# Patient Record
Sex: FEMALE | Race: WHITE | NOT HISPANIC OR LATINO | Employment: PART TIME | URBAN - METROPOLITAN AREA
[De-identification: names, ages, dates, MRNs, and addresses within clinical notes are randomized per-mention and may not be internally consistent; named-entity substitution may affect disease eponyms.]

---

## 2017-02-07 ENCOUNTER — OFFICE VISIT (OUTPATIENT)
Dept: HEMATOLOGY | Facility: CLINIC | Age: 37
End: 2017-02-07
Attending: INTERNAL MEDICINE
Payer: COMMERCIAL

## 2017-02-07 VITALS
DIASTOLIC BLOOD PRESSURE: 95 MMHG | SYSTOLIC BLOOD PRESSURE: 154 MMHG | TEMPERATURE: 97.4 F | BODY MASS INDEX: 34.98 KG/M2 | WEIGHT: 236.2 LBS | HEART RATE: 93 BPM | HEIGHT: 69 IN

## 2017-02-07 DIAGNOSIS — O26.20 RECURRENT PREGNANCY LOSS, ANTEPARTUM: ICD-10-CM

## 2017-02-07 DIAGNOSIS — O26.20 RECURRENT PREGNANCY LOSS, ANTEPARTUM: Primary | ICD-10-CM

## 2017-02-07 PROCEDURE — 85730 THROMBOPLASTIN TIME PARTIAL: CPT | Performed by: INTERNAL MEDICINE

## 2017-02-07 PROCEDURE — 86147 CARDIOLIPIN ANTIBODY EA IG: CPT | Performed by: INTERNAL MEDICINE

## 2017-02-07 PROCEDURE — 86146 BETA-2 GLYCOPROTEIN ANTIBODY: CPT | Performed by: INTERNAL MEDICINE

## 2017-02-07 PROCEDURE — 99213 OFFICE O/P EST LOW 20 MIN: CPT

## 2017-02-07 PROCEDURE — 36415 COLL VENOUS BLD VENIPUNCTURE: CPT | Performed by: INTERNAL MEDICINE

## 2017-02-07 PROCEDURE — 85613 RUSSELL VIPER VENOM DILUTED: CPT | Performed by: INTERNAL MEDICINE

## 2017-02-07 PROCEDURE — 00000167 ZZHCL STATISTIC INR NC: Performed by: INTERNAL MEDICINE

## 2017-02-07 PROCEDURE — 00000401 ZZHCL STATISTIC THROMBIN TIME NC: Performed by: INTERNAL MEDICINE

## 2017-02-07 PROCEDURE — 99215 OFFICE O/P EST HI 40 MIN: CPT | Mod: GC | Performed by: INTERNAL MEDICINE

## 2017-02-07 ASSESSMENT — PAIN SCALES - GENERAL: PAINLEVEL: NO PAIN (0)

## 2017-02-07 NOTE — MR AVS SNAPSHOT
After Visit Summary   2/7/2017    Ruma Vega    MRN: [id number]           Patient Information     Date Of Birth          1980        Visit Information        Provider Department      2/7/2017 4:00 PM Delroy Smart MD [facility]        Today's Diagnoses     Recurrent pregnancy loss, antepartum    -  1       Care Instructions    Labs today.  Orders have been placed for antiphospholipid antibody testing.  The lab located on first floor of this building. We will call you with the results.    We talked about the possibility of getting treated with Enoxaparin (Lovenox) 40 mg once daily along with Aspirin 81 mg.  This may be used if you if you have antibodies or may be used empirically.    Call the [facility]  at [phone]   -If surgeries or procedures are planned.    -If off anticoagulation, please call during high risk times (long-distance travel, broken      bones or trauma, immobilization, surgery, pregnancy, or taking estrogen).   -Any new symptoms of DVT (deep vein thrombosis) or PE (pulmonary embolism)    -pain     -swelling     -redness    -warmth    -shortness of breath    -chest pain    -coughing up blood    Return to clinic as desired and about 30 weeks along a pregnancy to discuss a delivery plan. .    Your nurse clinician is Rossana Vitale RN at phone number  [phone].  If she is unavailable and you have immediate concerns, please call [phone] and ask for a nurse.     Rossana Vitale RN - Nurse Clinician - [facility] - [phone]              Follow-ups after your visit        Future tests that were ordered for you today     Open Future Orders        Priority Expected Expires Ordered    Beta 2 Glycoprotein 1 Antibody IgM Routine  2/7/2018 2/7/2017    Beta 2 Glycoprotein 1 Antibody IgG Routine  2/7/2018 2/7/2017    Lupus panel Routine  2/7/2018 2/7/2017            Who to  "contact     If you have questions or need follow up information about today's clinic visit or your schedule please contact [facility] directly at [phone].  Normal or non-critical lab and imaging results will be communicated to you by MyChart, letter or phone within 4 business days after the clinic has received the results. If you do not hear from us within 7 days, please contact the clinic through [employer] or phone. If you have a critical or abnormal lab result, we will notify you by phone as soon as possible.  Submit refill requests through [facility] or call your pharmacy and they will forward the refill request to us. Please allow 3 business days for your refill to be completed.          Additional Information About Your Visit        [facility] Information     [facility] gives you secure access to your electronic health record. If you see a primary care provider, you can also send messages to your care team and make appointments. If you have questions, please call your primary care clinic.  If you do not have a primary care provider, please call [phone] and they will assist you.        Care EveryWhere ID     This is your Care EveryWhere ID. This could be used by other organizations to access your [place] medical records  [id number]        Your Vitals Were     Pulse Temperature Height BMI (Body Mass Index)          93 97.4  F (36.3  C) (Oral) 1.753 m (5' 9\") 34.86 kg/m2         Blood Pressure from Last 3 Encounters:   02/07/17 154/95   06/27/16 152/94   11/18/15 143/88    Weight from Last 3 Encounters:   02/07/17 107.14 kg (236 lb 3.2 oz)   06/27/16 104.509 kg (230 lb 6.4 oz)   11/18/15 106.459 kg (234 lb 11.2 oz)              We Performed the Following     Cardiolipin Tiffany IgG and IgM        Primary Care Provider Office Phone # Fax #    Shannon Jerri Holder [phone]        [facility] [address] [address]  [facility] [address]        Thank you!     Thank you for " choosing [facility]ED[facility]  for your care. Our goal is always to provide you with excellent care. Hearing back from our patients is one way we can continue to improve our services. Please take a few minutes to complete the written survey that you may receive in the mail after your visit with us. Thank you!             Your Updated Medication List - Protect others around you: Learn how to safely use, store and throw away your medicines at www.disposemymeds.org.          This list is accurate as of: 2/7/17  4:48 PM.  Always use your most recent med list.                   Brand Name Dispense Instructions for use    FLONASE 50 MCG/ACT spray   Generic drug:  fluticasone     1 Package    Spray 1-2 sprays into both nostrils daily       ketotifen 0.025 % Soln ophthalmic solution    ZADITOR/REFRESH ANTI-ITCH     Place 1 drop into both eyes 2 times daily       PRENATAL VITAMINS PO      Take  by mouth daily.       psyllium 58.6 % Powd    METAMUCIL     Take by mouth daily       SINUS RINSE BOTTLE KIT NA      Spray 1 packet in nostril as needed

## 2017-02-07 NOTE — NURSING NOTE
"Chief Complaint   Patient presents with     RECHECK     follow up with clotting disorder, tzimmer cma       Initial /95 mmHg  Pulse 93  Temp(Src) 97.4  F (36.3  C) (Oral)  Ht 1.753 m (5' 9\")  Wt 107.14 kg (236 lb 3.2 oz)  BMI 34.86 kg/m2 Estimated body mass index is 34.86 kg/(m^2) as calculated from the following:    Height as of this encounter: 1.753 m (5' 9\").    Weight as of this encounter: 107.14 kg (236 lb 3.2 oz).  Medication Reconciliation: complete    "

## 2017-02-07 NOTE — PATIENT INSTRUCTIONS
Labs today.  Orders have been placed for antiphospholipid antibody testing.  The lab located on first floor of this building. We will call you with the results.    We talked about the possibility of getting treated with Enoxaparin (Lovenox) 40 mg once daily along with Aspirin 81 mg.  This may be used if you if you have antibodies or may be used empirically.    Call the [facility]  at [phone]   -If surgeries or procedures are planned.    -If off anticoagulation, please call during high risk times (long-distance travel, broken      bones or trauma, immobilization, surgery, pregnancy, or taking estrogen).   -Any new symptoms of DVT (deep vein thrombosis) or PE (pulmonary embolism)    -pain     -swelling     -redness    -warmth    -shortness of breath    -chest pain    -coughing up blood    Return to clinic as desired and about 30 weeks along a pregnancy to discuss a delivery plan. .    Your nurse clinician is Rossana Vitale RN at phone number  [phone].  If she is unavailable and you have immediate concerns, please call [phone] and ask for a nurse.     Rossana Vitale RN - Nurse Clinician - [facility] - [phone]

## 2017-02-07 NOTE — PROGRESS NOTES
"  [facility]  Outpatient Clinic Visit  Date of Service: 2/7/2017    HPI:  Ruma is a 34-year-old woman with heterozygous for factor V Leiden who is here today for discussion regarding the implications of her genetic thrombophilia during pregnancy. She was last seen by Dr. Smart in 7/2015.  She was a kidney donor for her father who is heterozygous for factor V Leiden on routine testing prior to transplant and the reason why she was tested.  Her father has had superficial thrombophlebitis.     Since we last saw her in 2015, she has remained healthy.  She has had no clotting events. No new FH of VTE. She however has had two miscarriages at approx 6 weeks gestation (10/2015 and 9/2016). She is undergoing progesterone and Clomid fertility treatments since last August.  She has questions regarding her factor V Leiden status as it relates to risk of clotting during and after pregnancy. She is wondering if there is anything else she can do to prevent miscarriage from clotting perspective. Her overall health has otherwise remained good.  For the second pregnancy she took daily baby aspirin.      PHYSICAL EXAMINATION:    /95 mmHg  Pulse 93  Temp(Src) 97.4  F (36.3  C) (Oral)  Ht 1.753 m (5' 9\")  Wt 107.14 kg (236 lb 3.2 oz)  BMI 34.86 kg/m2  Gen: appears well, NAD  HEENT: NCAT, sclera nonicteric, MMM  CV: RRR  Chest: CTAB  Ext: no LE edema       LABORATORY DATA:  No new labs were obtained here today.         ASSESSMENT AND PLAN:   1.  Heterozygous for factor V Leiden with no personal history of thrombosis.    2.  Family history of superficial thrombophlebitis in her father who also has factor V Leiden.  No other known family history of venous or arterial thrombosis.    3.  Recurrent 1st trimester miscarriage     She is an asymptomatic carrier of the factor V Leiden mutation.  We discussed that current thought is that Factor V Leiden (or other inherited thrombophilias) are not the " cause of early pregnancy loss. Given the absence of any personal clotting history and given her reassuring family history, she does not need prophylactic anticoagulation for Factor V Leiden indication during pregnancy. Postpartum, we would recommend 6 weeks of prophylactic anticoagulation with enoxaparin 40 mg once daily, although recently there has been a tendency to even back off that recommendation, particularly for individuals with a reassuring family history.      We reviewed that the one clotting issue that is aossciated with early pregnancy loss is antiphospholipid antibody syndrome. We reviewed that diagnosis requires three pregnancy losses, however given the cost of infertility treatments she would prefer to do testing now instead of waiting for another miscarriage. If she is found to have positive lupus inhibitor, anti-CL or b2-GP antibodies we could do prophylactic enoxaparin 40 mg antepartum plus daily baby aspirin.      Should she have negative testing for APLS, then no strong indication for anticoagulation, however given the minimal risk of prophylactic Lovenox this could be considered if she was anxious to do something. She understands the likely minimal benefit of this.     Plan:  -send lupus AC, anti-CL and B2-GP antibodies  -pt will be called with results   -RTC as needed if new questions or concerns arise    Patient seen and discussed with Dr. Smart.  Glendy Bledsoe MD         HEMATOLOGY STAFF:  Seen with fellow, whose note reflects our joint evaluation, assessment, and plan.    No evidence of antiphospholipid antibodies -- lupus anticoagulant, cardiolipin and beta-2-glycoprotein IgG and IgM titers are all negative.    As outlined above, we would recommend 6 weeks of post partum prophylactic intensity anticoagulation (enoxaparin 40 mg daily) given that she is heterozygous for factor V Leiden.    Could consider adding empiric prophylactic intensity anticoagulation (enoxaparin 40 mg daily) to ASA  during pregnancy due to recurrent early miscarriage, but this approach is not evidence based, and may not provide any additional protection against recurrent pregnancy loss.  I would lean away from this, but if she felt very strongly, would be willing to support her decision, given that the bleeding risk would be very low.    Delroy Smart MD  Associate Professor of Medicine  Division of Hematology, Oncology, and Transplantation  Director, [facility]      Total time 40 minutes, all in counseling and coordination of care.

## 2017-02-07 NOTE — NURSING NOTE
Teaching Flowsheet   Relevant Diagnosis: no history of clotting, is heterozygote for factor V Leiden.  Trying to get pregnant and has a history of 2 early miscarriages.   Teaching Topic: basics of thrombosis and plan of care     Person(s) involved in teaching:   Patient and , Nicola     Motivation Level:  Asks Questions: Yes    Eager to Learn: Yes  Cooperative: Yes  Receptive (willing/able to accept information): Yes  Comments: took copious notes and asked many appropriate questions.     Patient and Family demonstrates understanding of the following:  Reason for the appointment, diagnosis and treatment plan: Yes  Knowledge of proper use of medications and conditions for which they are ordered (with special attention to potential side effects or drug interactions): Yes  Which situations necessitate calling provider and whom to contact: Yes      Proper use and care of   (medical equip, care aids, etc.): NA  Nutritional needs and diet plan: NA  Pain management techniques: NA  Wound Care: NA  How and/when to access community resources: Yes    Reviewed signs, symptoms of and risk factors for venous thrombosis (VTE) and provided an educational  book mariam, which reviews these facts. And includes the following web links  for further information:  www.stoptheclot.org, www.clotconnect.org.    Patient educated about benefits and potential complications of anticoagulation.       Discussed process to self inject enoxaparin.  She has done this previously and will provide online resources if plan is to use this.     Patient verbalized understanding of the above information.  Reviewed and discussed the patient instructions point by point and patient verbalized understanding.They deny further questions at this time.    Copy of the After Visit Summary (AVS) given to patient for future reference. Patient given the contact card for the [facility] and has the appropriate numbers to call with any  questions.    Rossana Vitale RN - Nurse Clinician - [facility] - [phone]

## 2017-02-08 LAB
CARDIOLIPIN ANTIBODY IGG: NORMAL GPL-U/ML (ref 0–19.9)
CARDIOLIPIN ANTIBODY IGM: 4.9 MPL-U/ML (ref 0–19.9)

## 2017-02-09 LAB
B2 GLYCOPROT1 IGG SERPL IA-ACNC: NORMAL U/ML
B2 GLYCOPROT1 IGM SERPL IA-ACNC: NORMAL U/ML

## 2017-02-10 LAB — LA PPP-IMP: NORMAL

## 2018-01-16 ENCOUNTER — TRANSFERRED RECORDS (OUTPATIENT)
Dept: HEALTH INFORMATION MANAGEMENT | Facility: CLINIC | Age: 38
End: 2018-01-16

## 2018-01-16 ENCOUNTER — MYC MEDICAL ADVICE (OUTPATIENT)
Dept: OBGYN | Facility: CLINIC | Age: 38
End: 2018-01-16

## 2018-01-17 NOTE — TELEPHONE ENCOUNTER
Spoke with patient via telephone to coordinate requested appointments. Scheduled 3 appts: u/s, intake, and rtn with Dr. Wheeler, on 1/29.     Patient appreciative and had no further questions at this time.

## 2018-01-29 ENCOUNTER — OFFICE VISIT (OUTPATIENT)
Dept: OBGYN | Facility: CLINIC | Age: 38
End: 2018-01-29
Attending: ADVANCED PRACTICE MIDWIFE
Payer: COMMERCIAL

## 2018-01-29 VITALS — WEIGHT: 229.5 LBS | BODY MASS INDEX: 33.99 KG/M2 | HEIGHT: 69 IN

## 2018-01-29 DIAGNOSIS — O26.20 RECURRENT PREGNANCY LOSS WITH CURRENT PREGNANCY: ICD-10-CM

## 2018-01-29 DIAGNOSIS — O26.20 RECURRENT PREGNANCY LOSS, CURRENTLY PREGNANT: ICD-10-CM

## 2018-01-29 DIAGNOSIS — O09.521 ELDERLY MULTIGRAVIDA IN FIRST TRIMESTER: ICD-10-CM

## 2018-01-29 DIAGNOSIS — D68.51 FACTOR V LEIDEN (H): ICD-10-CM

## 2018-01-29 DIAGNOSIS — D68.51 FACTOR V LEIDEN (H): Primary | ICD-10-CM

## 2018-01-29 DIAGNOSIS — O09.90 HIGH RISK PREGNANCY, ANTEPARTUM: Primary | ICD-10-CM

## 2018-01-29 PROBLEM — O09.529 AMA (ADVANCED MATERNAL AGE) MULTIGRAVIDA 35+: Status: ACTIVE | Noted: 2018-01-29

## 2018-01-29 LAB
ABO + RH BLD: NORMAL
ABO + RH BLD: NORMAL
BASOPHILS # BLD AUTO: 0.1 10E9/L (ref 0–0.2)
BASOPHILS NFR BLD AUTO: 0.4 %
BLD GP AB SCN SERPL QL: NORMAL
BLOOD BANK CMNT PATIENT-IMP: NORMAL
DIFFERENTIAL METHOD BLD: ABNORMAL
EOSINOPHIL # BLD AUTO: 0.5 10E9/L (ref 0–0.7)
EOSINOPHIL NFR BLD AUTO: 4.2 %
ERYTHROCYTE [DISTWIDTH] IN BLOOD BY AUTOMATED COUNT: 12.5 % (ref 10–15)
HCT VFR BLD AUTO: 40.4 % (ref 35–47)
HGB BLD-MCNC: 13.5 G/DL (ref 11.7–15.7)
IMM GRANULOCYTES # BLD: 0.1 10E9/L (ref 0–0.4)
IMM GRANULOCYTES NFR BLD: 0.5 %
LYMPHOCYTES # BLD AUTO: 2.1 10E9/L (ref 0.8–5.3)
LYMPHOCYTES NFR BLD AUTO: 17.4 %
MCH RBC QN AUTO: 28.9 PG (ref 26.5–33)
MCHC RBC AUTO-ENTMCNC: 33.4 G/DL (ref 31.5–36.5)
MCV RBC AUTO: 87 FL (ref 78–100)
MONOCYTES # BLD AUTO: 0.6 10E9/L (ref 0–1.3)
MONOCYTES NFR BLD AUTO: 4.7 %
NEUTROPHILS # BLD AUTO: 8.9 10E9/L (ref 1.6–8.3)
NEUTROPHILS NFR BLD AUTO: 72.8 %
NRBC # BLD AUTO: 0 10*3/UL
NRBC BLD AUTO-RTO: 0 /100
PLATELET # BLD AUTO: 378 10E9/L (ref 150–450)
RBC # BLD AUTO: 4.67 10E12/L (ref 3.8–5.2)
SPECIMEN EXP DATE BLD: NORMAL
WBC # BLD AUTO: 12.2 10E9/L (ref 4–11)

## 2018-01-29 PROCEDURE — 85025 COMPLETE CBC W/AUTO DIFF WBC: CPT | Performed by: ADVANCED PRACTICE MIDWIFE

## 2018-01-29 PROCEDURE — 87389 HIV-1 AG W/HIV-1&-2 AB AG IA: CPT | Performed by: ADVANCED PRACTICE MIDWIFE

## 2018-01-29 PROCEDURE — 36415 COLL VENOUS BLD VENIPUNCTURE: CPT | Performed by: ADVANCED PRACTICE MIDWIFE

## 2018-01-29 PROCEDURE — 86901 BLOOD TYPING SEROLOGIC RH(D): CPT | Performed by: ADVANCED PRACTICE MIDWIFE

## 2018-01-29 PROCEDURE — 86850 RBC ANTIBODY SCREEN: CPT | Performed by: ADVANCED PRACTICE MIDWIFE

## 2018-01-29 PROCEDURE — G0463 HOSPITAL OUTPT CLINIC VISIT: HCPCS | Mod: ZF

## 2018-01-29 PROCEDURE — 86762 RUBELLA ANTIBODY: CPT | Performed by: ADVANCED PRACTICE MIDWIFE

## 2018-01-29 PROCEDURE — 86780 TREPONEMA PALLIDUM: CPT | Performed by: ADVANCED PRACTICE MIDWIFE

## 2018-01-29 PROCEDURE — 87340 HEPATITIS B SURFACE AG IA: CPT | Performed by: ADVANCED PRACTICE MIDWIFE

## 2018-01-29 PROCEDURE — 87086 URINE CULTURE/COLONY COUNT: CPT | Performed by: ADVANCED PRACTICE MIDWIFE

## 2018-01-29 PROCEDURE — 86900 BLOOD TYPING SEROLOGIC ABO: CPT | Performed by: ADVANCED PRACTICE MIDWIFE

## 2018-01-29 PROCEDURE — 82306 VITAMIN D 25 HYDROXY: CPT | Performed by: ADVANCED PRACTICE MIDWIFE

## 2018-01-29 PROCEDURE — 76817 TRANSVAGINAL US OBSTETRIC: CPT | Mod: ZF

## 2018-01-29 RX ORDER — PROGESTERONE 50 MG/ML
50 INJECTION, SOLUTION INTRAMUSCULAR DAILY
COMMUNITY
End: 2018-02-15

## 2018-01-29 RX ORDER — MOMETASONE FUROATE 1 MG/G
CREAM TOPICAL DAILY
COMMUNITY
End: 2019-09-23

## 2018-01-29 NOTE — MR AVS SNAPSHOT
After Visit Summary   1/29/2018    Ruma Vega    MRN: [id number]           Patient Information     Date Of Birth          1980        Visit Information        Provider Department      1/29/2018 12:45 PM Agnes Wheeler MD [facility]        Today's Diagnoses     High risk pregnancy, antepartum    -  1    Factor V Leiden (H)        Elderly multigravida in first trimester        Recurrent pregnancy loss, currently pregnant           Follow-ups after your visit        Follow-up notes from your care team     Return in about 2 weeks (around 2/12/2018) for NOB visit with Liam.      Your next 10 appointments already scheduled     Feb 15, 2018  9:15 AM CST   RETURN OB with Agnes Wheeler MD   [facility] ([facility])    [facility] [address]  [address]  [facility] [id number] [phone]              Who to contact     Please call your clinic at [phone] to:    Ask questions about your health    Make or cancel appointments    Discuss your medicines    Learn about your test results    Speak to your doctor   If you have compliments or concerns about an experience at your clinic, or if you wish to file a complaint, please contact [facility] Patient Relations at [phone] or email us at [email or web address]         Additional Information About Your Visit        MyChart Information     [employer] gives you secure access to your electronic health record. If you see a primary care provider, you can also send messages to your care team and make appointments. If you have questions, please call your primary care clinic.  If you do not have a primary care provider, please call [phone] and they will assist you.      [employer] is an electronic gateway that provides easy, online access to your medical records. With [employer], you can request a clinic appointment, read your  test results, renew a prescription or communicate with your care team.     To access your existing account, please contact your [facility] or call [phone] for assistance.        Care EveryWhere ID     This is your Care EveryWhere ID. This could be used by other organizations to access your [place] medical records  [id number]         Blood Pressure from Last 3 Encounters:   02/07/17 (!) 154/95   06/27/16 (!) 152/94   11/18/15 143/88    Weight from Last 3 Encounters:   01/29/18 104.1 kg (229 lb 8 oz)   02/07/17 107.1 kg (236 lb 3.2 oz)   06/27/16 104.5 kg (230 lb 6.4 oz)              Today, you had the following     No orders found for display       Primary Care Provider Office Phone # Fax #    Shannon Jerri Holder [phone]       [facility] [address]  [facility] [address]        Equal Access to Services     DEBRA [facility] HospitalANJALI : Hadii aad ku hadasho Soomaali, waaxda luqadaha, qaybta kaalmada adeegyada, waxay idiin haywendien pablo shoemaker . So [facility] [phone].    ATENCIÓN: Si habla español, tiene a figueroa disposición servicios gratuitos de asistencia lingüística. Llame al [phone].    We comply with applicable federal civil rights laws and [place] laws. We do not discriminate on the basis of race, color, national origin, age, disability, sex, sexual orientation, or gender identity.            Thank you!     Thank you for choosing [facility]  for your care. Our goal is always to provide you with excellent care. Hearing back from our patients is one way we can continue to improve our services. Please take a few minutes to complete the written survey that you may receive in the mail after your visit with us. Thank you!             Your Updated Medication List - Protect others around you: Learn how to safely use, store and throw away your medicines at www.disposemymeds.org.          This list is accurate as of 1/29/18   1:28 PM.  Always use your most recent med list.                   Brand Name Dispense Instructions for use Diagnosis    aspirin 81 MG tablet      Take by mouth daily        FLONASE 50 MCG/ACT spray   Generic drug:  fluticasone     1 Package    Spray 1-2 sprays into both nostrils daily    Allergic rhinitis       FOLIC ACID PO      Take 800 mcg by mouth daily        ketotifen 0.025 % Soln ophthalmic solution    ZADITOR/REFRESH ANTI-ITCH     Place 1 drop into both eyes 2 times daily        mometasone 0.1 % cream    ELOCON     Apply topically daily        PRENATAL VITAMINS PO      Take  by mouth daily.    Flu vaccine need       progesterone (in sesame oil) 50 MG/ML injection      Inject 50 mg into the muscle daily        psyllium 58.6 % Powd    METAMUCIL     Take by mouth daily        SINUS RINSE BOTTLE KIT NA      Spray 1 packet in nostril as needed

## 2018-01-29 NOTE — PATIENT INSTRUCTIONS
"  Thank you for choosing [facility] for your obstetrical care.  We are an integrated health clinic with obstetricians, midwives, a psychologist, an acupuncturist, a nutritionist, a pharmacist, internal medicine and family practice all in one.  If you have questions about services offered please ask.      o Please keep all appointments with your provider.  You will help catch, prevent and treat problems early.  o Review your Expectant Family booklet and folder given to you at this intake visit.  It can answer many basic questions including:    Treatment for nausea and vomiting    Medications that are safe in pregnancy    Healthy diet and weight gain    Exercise and activity  o ASK questions!!  Please use \"Questions for my New OB visit\" form to write down any questions or concerns for your next visit.  o Eat a healthy diet.  Visit www.choosemyplate.gov and click on  Pregnancy and Breastfeeding  for information and tips  o Do not smoke.  Avoid other people's smoke, too.  We are happy to help with referrals to stop smoking programs.  o Do not drink alcohol.  o Try to avoid people who have colds or other infections.  Practice good hand washing.  o Consider registering for our Healthy Pregnancy Class here at [facility].  This class is offered every 3rd Wednesday from 2:30-4:30 p.m.  [place] at [phone] or online at [email or web address] or [email or web address]  o Consider registering for prenatal education classes through [place] at [facility].  You can view class schedules and register online at [email or web address] or call [phone] for questions     For urgent concerns, call [facility] at [phone] to speak with a triage nurse during regular clinic hours (8:00 am - 4:30 pm).  This line is answered by our service 24 hours a day, after hours a provider will return your call.  "

## 2018-01-29 NOTE — PROGRESS NOTES
Acute OB Visit Note  18    Reason for visit: Check in for pregnancy care    S: Patient is a 36 yo  @ 9w2d by early US at [facility] who presents to discuss continuity of care from [facility].  Patient initially evaluated by me for fertility concerns and was referred to [facility] secondary to low AMH.  Patient has since unfortunately had 4 early losses.  This pregnancy was medically controlled with medications at [facility], but conceived without IUI or IVF.  Patient states aside from some fatigue feeling well.  Is doing injectable progesterone through 10 weeks per [facility] as had previously used progesterone vaginal suppositories with lots of vaginal irritation and bleeding.  She denies any bleeding or cramping currently, but two weeks back prior to initiation of the progesterone did have some cramping that resolved with progesterone.  She is taking baby aspirin but has decided again Lovenox now, but open to taking postpartum.  She otherwise just wants to be sure that all continues to go well, aware of signs to look for with regards to miscarriage.  Has new OB intake earlier today with viability scan.    O:  There were no vitals filed for this visit.   See OB intake visit for vitals    General: NAD, A&Ox3  Resp: Non-labored breathing    A/P: 37 yo  @ 9w2d by early US presents for acute OB visit  1) Prenatal care: Has NOB labs pending today from NOB intake.  Will need GC/Chlamydia at NOB visit.  2) AMA/Genetic screening: Patient had genetic counseling and MFM referral placed at NOB intake, discussed first trimester and NIPT testing and patient will likely proceed with this.  Will also get early GCT and preeclampsia labs due to this history.  3) Screening for malignant neoplasm of cervix: History of LSIL/CHRIS-1 in .  Has NILM pap in  and NILM/HPV negative pap in 2015, not due until 2020.  4) Factor V Leiden heterozygote: Patient seen by Dr. Smart in the past.  Has negative APS testing.  Is on baby ASA currently.  No  personal history of clot.  Plan for 6 weeks Lovenox 40 mg daily postpartum and patient agreeable with this plan, declines during prenatal care as not studies to show in her case beneficial.  5) History of kidney donation: Given to her father in past in 2010 without altered kidney function.  Will get baseline creatinine and Urine Pr:Cr given AMA and this history at NOB visit  6) Check flu shot status at NOB visit  7) RTC in 2 weeks for NOB visit    Agnes Wheeler MD

## 2018-01-29 NOTE — PROGRESS NOTES
37 year old female, , 9 2/7 weeks by early ultrasound.Estimated Date of Delivery: 2018,   presents for confirmation of dates and assessment of viability. This study was done transvaginally.    Measurements     CRL = 24.8 mm = 9 1/7 weeks  EGA.        Fetal anatomy appears normal for gestational age.     Fetal/Fetal Cardiac Activity: Present.  FHR = 176bpm.     Implantation: Intrauterine.     Cervix = 4.8 cm      Maternal structures appear normal.    Impression: Single viable intrauterine pregnancy at 9w2d c/w early US dating from [facility].  Use CHRISTINA of 18 based on early scan.    Recommend comprehensive scan at 18 to 20 weeks.    IKER Mercado MD

## 2018-01-29 NOTE — MR AVS SNAPSHOT
After Visit Summary   1/29/2018    Ruma Vega    MRN: [id number]           Patient Information     Date Of Birth          1980        Visit Information        Provider Department      1/29/2018 10:30 AM [facility] ULTRASOUND [facility]        Today's Diagnoses     Factor V Leiden (H)    -  1    Recurrent pregnancy loss with current pregnancy           Follow-ups after your visit        Your next 10 appointments already scheduled     Jan 29, 2018 12:45 PM CST   Return Visit with Agnes Wheeler MD   [facility] ([facility])    [facility] [address]  [address]  [facility] [id number] [phone]            Feb 12, 2018 11:30 AM CST   NEW OB with DANA Morocho CNM   [facility] ([facility])    [facility] [address]  [address]  [facility] [address] [phone]              Who to contact     Please call your clinic at [phone] to:    Ask questions about your health    Make or cancel appointments    Discuss your medicines    Learn about your test results    Speak to your doctor   If you have compliments or concerns about an experience at your clinic, or if you wish to file a complaint, please contact [facility] Patient Relations at [phone] or email us at [email or web address]         Additional Information About Your Visit        MyChart Information     [employer] gives you secure access to your electronic health record. If you see a primary care provider, you can also send messages to your care team and make appointments. If you have questions, please call your primary care clinic.  If you do not have a primary care provider, please call [phone] and they will assist you.      inSelly is an electronic gateway that provides easy, online access to your medical records. With  Matt, you can request a clinic appointment, read your test results, renew a prescription or communicate with your care team.     To access your existing account, please contact your [facility] or call [phone] for assistance.        Care EveryWhere ID     This is your Care EveryWhere ID. This could be used by other organizations to access your [place] medical records  [id number]         Blood Pressure from Last 3 Encounters:   02/07/17 (!) 154/95   06/27/16 (!) 152/94   11/18/15 143/88    Weight from Last 3 Encounters:   01/29/18 104.1 kg (229 lb 8 oz)   02/07/17 107.1 kg (236 lb 3.2 oz)   06/27/16 104.5 kg (230 lb 6.4 oz)               Primary Care Provider Office Phone # Fax #    Shannon Jerri Holder [phone]       [facility] [address] [facility] [address]        Equal Access to Services     JOSÉ ANTONIO WESTFALL : Hadii aad ku hadasho Soomaali, waaxda luqadaha, qaybta kaalmada adeegyada, waxay idiin hayaan sabrinaeg kharakenna shoemaker . So [facility] [phone].    ATENCIÓN: Si habla español, tiene a figueroa disposición servicios gratuitos de asistencia lingüística. Adiparul al [phone].    We comply with applicable federal civil rights laws and [place] laws. We do not discriminate on the basis of race, color, national origin, age, disability, sex, sexual orientation, or gender identity.            Thank you!     Thank you for choosing [facility]  for your care. Our goal is always to provide you with excellent care. Hearing back from our patients is one way we can continue to improve our services. Please take a few minutes to complete the written survey that you may receive in the mail after your visit with us. Thank you!             Your Updated Medication List - Protect others around you: Learn how to safely use, store and throw away your medicines at www.disposemymeds.org.          This list is accurate as of 1/29/18 12:35 PM.   Always use your most recent med list.                   Brand Name Dispense Instructions for use Diagnosis    aspirin 81 MG tablet      Take by mouth daily        FLONASE 50 MCG/ACT spray   Generic drug:  fluticasone     1 Package    Spray 1-2 sprays into both nostrils daily    Allergic rhinitis       FOLIC ACID PO      Take 800 mcg by mouth daily        ketotifen 0.025 % Soln ophthalmic solution    ZADITOR/REFRESH ANTI-ITCH     Place 1 drop into both eyes 2 times daily        mometasone 0.1 % cream    ELOCON     Apply topically daily        PRENATAL VITAMINS PO      Take  by mouth daily.    Flu vaccine need       progesterone (in sesame oil) 50 MG/ML injection      Inject 50 mg into the muscle daily        psyllium 58.6 % Powd    METAMUCIL     Take by mouth daily        SINUS RINSE BOTTLE KIT NA      Spray 1 packet in nostril as needed

## 2018-01-29 NOTE — LETTER
2018       RE: Ruma Vega  [address] [facility] [address]     Dear Colleague,    Thank you for referring your patient, Ruma Vega, to the [facility] at [facility]. Please see a copy of my visit note below.    Acute OB Visit Note  18    Reason for visit: Check in for pregnancy care    S: Patient is a 38 yo  @ 9w2d by early US at [facility] who presents to discuss continuity of care from [facility].  Patient initially evaluated by me for fertility concerns and was referred to [facility] secondary to low AMH.  Patient has since unfortunately had 4 early losses.  This pregnancy was medically controlled with medications at [facility], but conceived without IUI or IVF.  Patient states aside from some fatigue feeling well.  Is doing injectable progesterone through 10 weeks per CRM as had previously used progesterone vaginal suppositories with lots of vaginal irritation and bleeding.  She denies any bleeding or cramping currently, but two weeks back prior to initiation of the progesterone did have some cramping that resolved with progesterone.  She is taking baby aspirin but has decided again Lovenox now, but open to taking postpartum.  She otherwise just wants to be sure that all continues to go well, aware of signs to look for with regards to miscarriage.  Has new OB intake earlier today with viability scan.    O:  There were no vitals filed for this visit.   See OB intake visit for vitals    General: NAD, A&Ox3  Resp: Non-labored breathing    A/P: 37 yo  @ 9w2d by early US presents for acute OB visit  1) Prenatal care: Has NOB labs pending today from NOB intake.  Will need GC/Chlamydia at NOB visit.  2) AMA/Genetic screening: Patient had genetic counseling and MFM referral placed at NOB intake, discussed first trimester and NIPT testing and patient will likely proceed with this.  Will also get early GCT and preeclampsia labs due to this  history.  3) Screening for malignant neoplasm of cervix: History of LSIL/CHRIS-1 in 2010.  Has NILM pap in 2011 and NILM/HPV negative pap in 7/2015, not due until 7/2020.  4) Factor V Leiden heterozygote: Patient seen by Dr. Smart in the past.  Has negative APS testing.  Is on baby ASA currently.  No personal history of clot.  Plan for 6 weeks Lovenox 40 mg daily postpartum and patient agreeable with this plan, declines during prenatal care as not studies to show in her case beneficial.  5) History of kidney donation: Given to her father in past in 2010 without altered kidney function.  Will get baseline creatinine and Urine Pr:Cr given AMA and this history at NOB visit  6) Check flu shot status at NOB visit  7) RTC in 2 weeks for NOB visit    Agnes Wheeler MD

## 2018-01-29 NOTE — MR AVS SNAPSHOT
"              After Visit Summary   1/29/2018    Ruma Vega    MRN: [id number]           Patient Information     Date Of Birth          1980        Visit Information        Provider Department      1/29/2018 11:00 AM Lucina Lu APRN CNM [facility]        Today's Diagnoses     High risk pregnancy, antepartum    -  1      Care Instructions      Thank you for choosing [facility] ([facility]) for your obstetrical care.  We are an integrated health clinic with obstetricians, midwives, a psychologist, an acupuncturist, a nutritionist, a pharmacist, internal medicine and family practice all in one.  If you have questions about services offered please ask.      o Please keep all appointments with your provider.  You will help catch, prevent and treat problems early.  o Review your Expectant Family booklet and folder given to you at this intake visit.  It can answer many basic questions including:    Treatment for nausea and vomiting    Medications that are safe in pregnancy    Healthy diet and weight gain    Exercise and activity  o ASK questions!!  Please use \"Questions for my New OB visit\" form to write down any questions or concerns for your next visit.  o Eat a healthy diet.  Visit www.choosemyplate.gov and click on  Pregnancy and Breastfeeding  for information and tips  o Do not smoke.  Avoid other people's smoke, too.  We are happy to help with referrals to stop smoking programs.  o Do not drink alcohol.  o Try to avoid people who have colds or other infections.  Practice good hand washing.  o Consider registering for our Healthy Pregnancy Class here at [facility].  This class is offered every 3rd Wednesday from 2:30-4:30 p.m.  [place] at [phone] or online at [email or web address] or [email or web address]  o Consider registering for prenatal education classes through [place] [facility].  You can view class schedules and " register online at [email or web address] or call [phone] for questions     For urgent concerns, call [facility] at [phone] to speak with a triage nurse during regular clinic hours (8:00 am - 4:30 pm).  This line is answered by our service 24 hours a day, after hours a provider will return your call.          Follow-ups after your visit        Additional Services     MAT FETAL MED CTR REFERRAL-PREGNANCY       >> Patient may proceed with recommendations for further testing as directed by the Maternal Fetal Medicine Specialist >>    >> If requesting Fetal Echo: MFM will determine appropriate location for exam due to indication.    >> If requesting Lung Maturity Amnio:  If results indicate fetal lung maturity, induction or C/S is recommended within 36 hours.  Please schedule accordingly.     Dear Patient:   Please be aware that coverage of these services is subject to the terms and limitations of your health insurance plan.  Call member services at your health plan with any benefit or coverage questions.      Please bring the following to your appointment:    >>  Any x-rays, CTs or MRIs which have been performed.  Contact the facility where they were done to arrange for  prior to your scheduled appointment.  Any new CT, MRI or other procedures ordered by your specialist must be performed at a [place] facility or coordinated by your clinic's referral office.  >>  List of current medications   >>  This referral request   >>  Any documents/labs given to you for this referral                  Follow-up notes from your care team     Return in about 2 weeks (around 2/12/2018) for NOB.      Your next 10 appointments already scheduled     Feb 15, 2018  9:15 AM CST   RETURN OB with Agnes Wheeler MD   [facility] ([facility])    [facility] [address]  [address]  [address]  [facility] [id number] [phone]              Who to contact      "Please call your clinic at [phone] to:    Ask questions about your health    Make or cancel appointments    Discuss your medicines    Learn about your test results    Speak to your doctor   If you have compliments or concerns about an experience at your clinic, or if you wish to file a complaint, please contact [facility] Patient Relations at [phone] or email us at [email or web address]         Additional Information About Your Visit        Wealth[employer]hart Information     [employer] gives you secure access to your electronic health record. If you see a primary care provider, you can also send messages to your care team and make appointments. If you have questions, please call your primary care clinic.  If you do not have a primary care provider, please call [phone] and they will assist you.      Flytenow is an electronic gateway that provides easy, online access to your medical records. With Flytenow, you can request a clinic appointment, read your test results, renew a prescription or communicate with your care team.     To access your existing account, please contact your [facility] or call [phone] for assistance.        Care EveryWhere ID     This is your Care EveryWhere ID. This could be used by other organizations to access your [place] medical records  [id number]        Your Vitals Were     Height BMI (Body Mass Index)                1.753 m (5' 9\") 33.89 kg/m2           Blood Pressure from Last 3 Encounters:   02/07/17 (!) 154/95   06/27/16 (!) 152/94   11/18/15 143/88    Weight from Last 3 Encounters:   01/29/18 104.1 kg (229 lb 8 oz)   02/07/17 107.1 kg (236 lb 3.2 oz)   06/27/16 104.5 kg (230 lb 6.4 oz)              We Performed the Following     25- OH-Vitamin D     ABO/Rh Type and Screen [YSD543]     Anti Treponema [CHI1503]     CBC with Platelets Differential [BBY177]     Hepatitis B Surface Antigen [OCJ439]     HIV Antigen " Antibody Combo [KYD1129]     MAT FETAL MED CTR REFERRAL-PREGNANCY     Rubella Antibody IgG Quantitative [YGD9029]     Urine Culture Aerobic Bacterial [QRN562]        Primary Care Provider Office Phone # Fax #    Shannon Jerri Holder [phone]       [facility] [address]  [facility] [address]        Equal Access to Services     [facility]: Hadii aad ku hadasho Soomaali, waaxda luqadaha, qaybta kaalmada adeegyada, waxay idiin hayaan adeeg khindush la'aan . So [facility] [phone].    ATENCIÓN: Si habla español, tiene a figueroa disposición servicios gratuitos de asistencia lingüística. Adiparul al [phone].    We comply with applicable federal civil rights laws and [place] laws. We do not discriminate on the basis of race, color, national origin, age, disability, sex, sexual orientation, or gender identity.            Thank you!     Thank you for choosing [facility]  for your care. Our goal is always to provide you with excellent care. Hearing back from our patients is one way we can continue to improve our services. Please take a few minutes to complete the written survey that you may receive in the mail after your visit with us. Thank you!             Your Updated Medication List - Protect others around you: Learn how to safely use, store and throw away your medicines at www.disposemymeds.org.          This list is accurate as of 1/29/18  1:15 PM.  Always use your most recent med list.                   Brand Name Dispense Instructions for use Diagnosis    aspirin 81 MG tablet      Take by mouth daily        FLONASE 50 MCG/ACT spray   Generic drug:  fluticasone     1 Package    Spray 1-2 sprays into both nostrils daily    Allergic rhinitis       FOLIC ACID PO      Take 800 mcg by mouth daily        ketotifen 0.025 % Soln ophthalmic solution    ZADITOR/REFRESH ANTI-ITCH     Place 1 drop into both eyes 2 times daily        mometasone 0.1 % cream    ELOCON      Apply topically daily        PRENATAL VITAMINS PO      Take  by mouth daily.    Flu vaccine need       progesterone (in sesame oil) 50 MG/ML injection      Inject 50 mg into the muscle daily        psyllium 58.6 % Powd    METAMUCIL     Take by mouth daily        SINUS RINSE BOTTLE KIT NA      Spray 1 packet in nostril as needed

## 2018-01-29 NOTE — PROGRESS NOTES
Subjective   37 year old female who presents to clinic with supportive , Nicola, for initiation of OB care.   at 9 weeks 2 days with estimated date of delivery of 2018 based on US at [facility]. This pregnancy was conceived spontaneously. Pregnancy is planned.    Patient has been receiving care at [facility]. Per pt her provider at [facility] advised the following activity restrictions until 16 weeks: 1. Maximum of 20 min activity at a time, 2. No HR above 140. 3. Following forms of exercise are acceptable: walking, eliptical, yoga, pilataes, swimming. 4. Did not advise pelvic rest.     Pt is currently using IM progesterone supplementations until 10 weeks as prescribed by her provider at [facility]. Hx of negative SE's with vaginal progesterone. Pt reports she was experiencing mild uterine cramping until the day she began progesterone supplementation.     Other symptoms since LMP include breast tenderness and fatigue.    Co-morbids: Factor V Leiden - had hematology consult 2017, recurrent pregnancy loss. Pt is agreeable to MFM consult.   Genetics: Factor V Leiden, AMA, no other risk factors identified. Pt desires early screening, MFM/[facility] referral placed.   Medications: See med rec.     Reviewed dating ultrasound.     PERSONAL/SOCIAL HISTORY  ,  Nicola  Lives lives with their spouse.  Employment: Full time.  Her job involves sedentary activity; works as an .  Additional items: None    Caffeine intake: none   Tobacco, EtOH, Drug use: none  Zika Exposure: none  R/f TORCH exposure: none  Dietary restrictions: none.     Objective  -VS: reviewed and within normal limits   -General appearance: no acute distress, patient is comfortable   NEUROLOGICAL/PSYCHIATRIC   - Orientated x3,   -Mood and affect: : normal   Genetic/Infection questionnaire completed - see above    Assessment/Plan   at 9 weeks 2 days by ultrasound at [facility].   Encounter Diagnosis   Name  Primary?     High risk pregnancy, antepartum Yes     Orders Placed This Encounter   Procedures     25- OH-Vitamin D     CBC with Platelets Differential [YAX681]     Anti Treponema [IHV1732]     Rubella Antibody IgG Quantitative [OHR0124]     Hepatitis B Surface Antigen [MFM639]     HIV Antigen Antibody Combo [IAE4817]     MAT FETAL MED CTR REFERRAL-PREGNANCY     ABO/Rh Type and Screen [SMO223]     Orders Placed This Encounter   Medications     aspirin 81 MG tablet     Sig: Take by mouth daily     FOLIC ACID PO     Sig: Take 800 mcg by mouth daily     mometasone (ELOCON) 0.1 % cream     Sig: Apply topically daily     progesterone, in sesame oil, 50 MG/ML injection     Sig: Inject 50 mg into the muscle daily     - Oriented to Practice, types of care, and how to reach a provider. Pt will follow MD team.   - Patient received 1st trimester new OB education packet complete with aide of The Expectant Family booklet including information on genetic screening test options.  - Patient desires 1st trimester screening which was ordered.  - Patient was encouraged to continue prenatal vitamins as tolerated.    - Patient was sent to lab for routine OB labs.    - Patient instructed to schedule new OB exam with provider in 1-2 weeks.  - Pregnancy concerns to be addressed by provider at new OB exam include: none.    Pt to RTO for NOB visit in 2 weeks and prn if questions or concerns    DANA Browne CNM

## 2018-01-29 NOTE — LETTER
2018  RE: Ruma Vega  [address] [place] [facility] [address]     Dear Colleague,  Thank you for referring your patient, Ruma Vega, to the [facility] at [facility]. Please see a copy of my visit note below.    37 year old female, , 9 2/7 weeks by early ultrasound.Estimated Date of Delivery: 2018,   presents for confirmation of dates and assessment of viability. This study was done transvaginally.    Measurements     CRL = 24.8 mm = 9 1/7 weeks  EGA.        Fetal anatomy appears normal for gestational age.     Fetal/Fetal Cardiac Activity: Present.  FHR = 176bpm.     Implantation: Intrauterine.     Cervix = 4.8 cm      Maternal structures appear normal.    Impression: Single viable intrauterine pregnancy at 9w2d c/w early US dating from [facility].  Use CHRISTINA of 18 based on early scan.    Recommend comprehensive scan at 18 to 20 weeks.    IKER Mercado MD

## 2018-01-29 NOTE — LETTER
2018       RE: Ruma Vega  [address] [facility] [address]     Dear Colleague,    Thank you for referring your patient, Ruma Vega, to the [facility] at [facility]. Please see a copy of my visit note below.      Subjective   37 year old female who presents to clinic with supportive , Nicola, for initiation of OB care.   at 9 weeks 2 days with estimated date of delivery of 2018 based on US at [facility]. This pregnancy was conceived spontaneously. Pregnancy is planned.    Patient has been receiving care at [facility]. Per pt her provider at [facility] advised the following activity restrictions until 16 weeks: 1. Maximum of 20 min activity at a time, 2. No HR above 140. 3. Following forms of exercise are acceptable: walking, eliptical, yoga, pilataes, swimming. 4. Did not advise pelvic rest.     Pt is currently using IM progesterone supplementations until 10 weeks as prescribed by her provider at [facility]. Hx of negative SE's with vaginal progesterone. Pt reports she was experiencing mild uterine cramping until the day she began progesterone supplementation.     Other symptoms since LMP include breast tenderness and fatigue.    Co-morbids: Factor V Leiden - had hematology consult 2017, recurrent pregnancy loss. Pt is agreeable to MFM consult.   Genetics: Factor V Leiden, AMA, no other risk factors identified. Pt desires early screening, MFM/[facility] referral placed.   Medications: See med rec.     Reviewed dating ultrasound.     PERSONAL/SOCIAL HISTORY  ,  Nicola  Lives lives with their spouse.  Employment: Full time.  Her job involves sedentary activity; works as an .  Additional items: None    Caffeine intake: none   Tobacco, EtOH, Drug use: none  Zika Exposure: none  R/f TORCH exposure: none  Dietary restrictions: none.     Objective  -VS: reviewed and within normal  limits   -General appearance: no acute distress, patient is comfortable   NEUROLOGICAL/PSYCHIATRIC   - Orientated x3,   -Mood and affect: : normal   Genetic/Infection questionnaire completed - see above    Assessment/Plan   at 9 weeks 2 days by ultrasound at [facility].   Encounter Diagnosis   Name Primary?     High risk pregnancy, antepartum Yes     Orders Placed This Encounter   Procedures     25- OH-Vitamin D     CBC with Platelets Differential [FBH229]     Anti Treponema [UBR2725]     Rubella Antibody IgG Quantitative [TIS9038]     Hepatitis B Surface Antigen [FAD639]     HIV Antigen Antibody Combo [FQI0547]     [facility] FETAL MED CTR REFERRAL-PREGNANCY     ABO/Rh Type and Screen [TEW298]     Orders Placed This Encounter   Medications     aspirin 81 MG tablet     Sig: Take by mouth daily     FOLIC ACID PO     Sig: Take 800 mcg by mouth daily     mometasone (ELOCON) 0.1 % cream     Sig: Apply topically daily     progesterone, in sesame oil, 50 MG/ML injection     Sig: Inject 50 mg into the muscle daily     - Oriented to Practice, types of care, and how to reach a provider. Pt will follow MD team.   - Patient received 1st trimester new OB education packet complete with aide of [employer] Expectant [employer] booklet including information on genetic screening test options.  - Patient desires 1st trimester screening which was ordered.  - Patient was encouraged to continue prenatal vitamins as tolerated.    - Patient was sent to lab for routine OB labs.    - Patient instructed to schedule new OB exam with provider in 1-2 weeks.  - Pregnancy concerns to be addressed by provider at new OB exam include: none.    Pt to RTO for NOB visit in 2 weeks and prn if questions or concerns    DANA Browne CNM

## 2018-01-30 LAB
BACTERIA SPEC CULT: NORMAL
DEPRECATED CALCIDIOL+CALCIFEROL SERPL-MC: 20 UG/L (ref 20–75)
HBV SURFACE AG SERPL QL IA: NONREACTIVE
HIV 1+2 AB+HIV1 P24 AG SERPL QL IA: NONREACTIVE
Lab: NORMAL
RUBV IGG SERPL IA-ACNC: 46 IU/ML
SPECIMEN SOURCE: NORMAL
T PALLIDUM IGG+IGM SER QL: NEGATIVE

## 2018-02-15 ENCOUNTER — PRE VISIT (OUTPATIENT)
Dept: MATERNAL FETAL MEDICINE | Facility: CLINIC | Age: 38
End: 2018-02-15

## 2018-02-15 ENCOUNTER — OFFICE VISIT (OUTPATIENT)
Dept: OBGYN | Facility: CLINIC | Age: 38
End: 2018-02-15
Attending: OBSTETRICS & GYNECOLOGY
Payer: COMMERCIAL

## 2018-02-15 VITALS
HEIGHT: 69 IN | BODY MASS INDEX: 34.57 KG/M2 | WEIGHT: 233.4 LBS | HEART RATE: 94 BPM | DIASTOLIC BLOOD PRESSURE: 83 MMHG | SYSTOLIC BLOOD PRESSURE: 126 MMHG

## 2018-02-15 DIAGNOSIS — Z52.4 KIDNEY DONOR: ICD-10-CM

## 2018-02-15 DIAGNOSIS — O26.20 RECURRENT PREGNANCY LOSS, CURRENTLY PREGNANT: ICD-10-CM

## 2018-02-15 DIAGNOSIS — O09.90 HIGH RISK PREGNANCY, ANTEPARTUM: Primary | ICD-10-CM

## 2018-02-15 DIAGNOSIS — D68.51 FACTOR V LEIDEN (H): ICD-10-CM

## 2018-02-15 DIAGNOSIS — O09.521 ELDERLY MULTIGRAVIDA IN FIRST TRIMESTER: ICD-10-CM

## 2018-02-15 DIAGNOSIS — O36.80X1 ENCOUNTER TO DETERMINE FETAL VIABILITY OF PREGNANCY, FETUS 1 OF MULTIPLE GESTATION: Primary | ICD-10-CM

## 2018-02-15 LAB
ALT SERPL W P-5'-P-CCNC: 55 U/L (ref 0–50)
AST SERPL W P-5'-P-CCNC: 26 U/L (ref 0–45)
CREAT SERPL-MCNC: 0.76 MG/DL (ref 0.52–1.04)
CREAT UR-MCNC: 35 MG/DL
GFR SERPL CREATININE-BSD FRML MDRD: 84 ML/MIN/1.7M2
PROT UR-MCNC: 0.05 G/L
PROT/CREAT 24H UR: 0.16 G/G CR (ref 0–0.2)

## 2018-02-15 PROCEDURE — 36415 COLL VENOUS BLD VENIPUNCTURE: CPT | Performed by: OBSTETRICS & GYNECOLOGY

## 2018-02-15 PROCEDURE — 84450 TRANSFERASE (AST) (SGOT): CPT | Performed by: OBSTETRICS & GYNECOLOGY

## 2018-02-15 PROCEDURE — 84156 ASSAY OF PROTEIN URINE: CPT | Performed by: OBSTETRICS & GYNECOLOGY

## 2018-02-15 PROCEDURE — 76815 OB US LIMITED FETUS(S): CPT | Mod: ZF

## 2018-02-15 PROCEDURE — 82565 ASSAY OF CREATININE: CPT | Performed by: OBSTETRICS & GYNECOLOGY

## 2018-02-15 PROCEDURE — 84460 ALANINE AMINO (ALT) (SGPT): CPT | Performed by: OBSTETRICS & GYNECOLOGY

## 2018-02-15 PROCEDURE — G0463 HOSPITAL OUTPT CLINIC VISIT: HCPCS | Mod: ZF

## 2018-02-15 NOTE — PROGRESS NOTES
Indication Doctor could not hear fetal heart tones on doppler Maternal age 37,  11w5d  Estimated Date of Delivery: Sep 1, 2018. This was a transabdominally study.    Ultrasound age 11 5/7 weeks.    FHR= 172bpm         Recommend follow up as clinically indicated.    IKER Preciado MD

## 2018-02-15 NOTE — LETTER
2/15/2018       RE: Ruma Vega  [address] [address]     Dear Colleague,    Thank you for referring your patient, Ruma Vega, to the [facility] at [facility]. Please see a copy of my visit note below.    Indication Doctor could not hear fetal heart tones on doppler Maternal age 37,  11w5d  Estimated Date of Delivery: Sep 1, 2018. This was a transabdominally study.    Ultrasound age 11 5/7 weeks.    FHR= 172bpm         Recommend follow up as clinically indicated.    IKER Preciado MD

## 2018-02-15 NOTE — MR AVS SNAPSHOT
After Visit Summary   2/15/2018    Ruma Vega    MRN: [id number]           Patient Information     Date Of Birth          1980        Visit Information        Provider Department      2/15/2018 9:15 AM Agnes Wheeler MD [facility]        Today's Diagnoses     High risk pregnancy, antepartum    -  1    Recurrent pregnancy loss, currently pregnant        Elderly multigravida in first trimester        Factor V Leiden (H)        Kidney donor           Follow-ups after your visit        Follow-up notes from your care team     Return in about 4 weeks (around 3/15/2018) for EVELYN Visit with Liam.      Your next 10 appointments already scheduled     Feb 20, 2018  8:00 AM CST   Genetic Counseling with [facility] GEN COUNSELOR 1   [facility])    [address]  [facility] [address]   [phone]            Feb 20, 2018  8:45 AM CST   (Arrive by 8:30 AM)   MFM NUCHAL TRANSLUCENCY with [id number]   [facility] Ultrasound - [facility])    [address]  [facility] [address]   [phone]            Feb 20, 2018  9:30 AM CST   Consult MFM with UR EXAM RM 1   [facility])    [address]  [facility] [address]   [phone]              Who to contact     Please call your clinic at [phone] to:    Ask questions about your health    Make or cancel appointments    Discuss your medicines    Learn about your test results    Speak to your doctor            Additional Information About Your Visit        MyChart Information     Re-APPt gives you secure access to your electronic health record. If you see a primary care provider, you can also send messages to your care team and make appointments. If you have questions, please call your  "primary care clinic.  If you do not have a primary care provider, please call [phone] and they will assist you.      FriendFeed is an electronic gateway that provides easy, online access to your medical records. With FriendFeed, you can request a clinic appointment, read your test results, renew a prescription or communicate with your care team.     To access your existing account, please contact your [facility] or call [phone] for assistance.        Care EveryWhere ID     This is your Care EveryWhere ID. This could be used by other organizations to access your [place] medical records  [id number]        Your Vitals Were     Pulse Height BMI (Body Mass Index)             94 1.753 m (5' 9\") 34.47 kg/m2          Blood Pressure from Last 3 Encounters:   02/15/18 126/83   02/07/17 (!) 154/95   06/27/16 (!) 152/94    Weight from Last 3 Encounters:   02/15/18 105.9 kg (233 lb 6.4 oz)   01/29/18 104.1 kg (229 lb 8 oz)   02/07/17 107.1 kg (236 lb 3.2 oz)              We Performed the Following     ALT     AST     Creatinine urine calculation only     Creatinine     Protein  random urine with Creat Ratio          Today's Medication Changes          These changes are accurate as of 2/15/18  2:49 PM.  If you have any questions, ask your nurse or doctor.               Stop taking these medicines if you haven't already. Please contact your care team if you have questions.     progesterone (in sesame oil) 50 MG/ML injection   Stopped by:  Agnes Wheeler MD                    Primary Care Provider Office Phone # Fax #    Shannon Jerri Holder [phone]       [facility] [address]  [facility] [address]        Equal Access to Services     DEBRA [place]ALL : Franck Parks, michelle thompson, luba reyes. So [facility] [phone].    ATENCIÓN: Si habla español, tiene a figueroa disposición servicios " jersey de asistencia lingüística. Avila rosales [phone].    We comply with applicable federal civil rights laws and [place] laws. We do not discriminate on the basis of race, color, national origin, age, disability, sex, sexual orientation, or gender identity.            Thank you!     Thank you for choosing [facility]  for your care. Our goal is always to provide you with excellent care. Hearing back from our patients is one way we can continue to improve our services. Please take a few minutes to complete the written survey that you may receive in the mail after your visit with us. Thank you!             Your Updated Medication List - Protect others around you: Learn how to safely use, store and throw away your medicines at www.disposemymeds.org.          This list is accurate as of 2/15/18  2:49 PM.  Always use your most recent med list.                   Brand Name Dispense Instructions for use Diagnosis    aspirin 81 MG tablet      Take by mouth daily        FLONASE 50 MCG/ACT spray   Generic drug:  fluticasone     1 Package    Spray 1-2 sprays into both nostrils daily    Allergic rhinitis       FOLIC ACID PO      Take 800 mcg by mouth daily        ketotifen 0.025 % Soln ophthalmic solution    ZADITOR/REFRESH ANTI-ITCH     Place 1 drop into both eyes 2 times daily        mometasone 0.1 % cream    ELOCON     Apply topically daily        PRENATAL VITAMINS PO      Take  by mouth daily.    Flu vaccine need       psyllium 58.6 % Powd    METAMUCIL     Take by mouth daily        SINUS RINSE BOTTLE KIT NA      Spray 1 packet in nostril as needed        VITAMIN D-3 PO

## 2018-02-15 NOTE — MR AVS SNAPSHOT
After Visit Summary   2/15/2018    Ruma Vega    MRN: [id number]           Patient Information     Date Of Birth          1980        Visit Information        Provider Department      2/15/2018 11:00 AM [facility] ULTRASOUND [facility]        Today's Diagnoses     Encounter to determine fetal viability of pregnancy, fetus 1 of multiple gestation    -  1       Follow-ups after your visit        Your next 10 appointments already scheduled     Feb 20, 2018  8:00 AM CST   Genetic Counseling with UR GEN COUNSELOR 1   [facility])    [address]  [facility] [address]   [phone]            Feb 20, 2018  8:45 AM CST   (Arrive by 8:30 AM)   MFM NUCHAL TRANSLUCENCY with [id number]   [facility] Ultrasound - [facility])    [address]  [facility] [address]   [phone]            Feb 20, 2018  9:30 AM CST   Consult MFM with UR EXAM RM 1   [facility])    [address]  [facility] [address]   [phone]              Who to contact     Please call your clinic at [phone] to:    Ask questions about your health    Make or cancel appointments    Discuss your medicines    Learn about your test results    Speak to your doctor            Additional Information About Your Visit        MyChart Information     MyGoodPointst gives you secure access to your electronic health record. If you see a primary care provider, you can also send messages to your care team and make appointments. If you have questions, please call your primary care clinic.  If you do not have a primary care provider, please call [phone] and they will assist you.      [employer] is an electronic gateway that provides easy, online access to your medical records.  With [employer]royat, you can request a clinic appointment, read your test results, renew a prescription or communicate with your care team.     To access your existing account, please contact your [facility] or call [phone] for assistance.        Care EveryWhere ID     This is your Care EveryWhere ID. This could be used by other organizations to access your [place] medical records  [id number]         Blood Pressure from Last 3 Encounters:   02/15/18 126/83   02/07/17 (!) 154/95   06/27/16 (!) 152/94    Weight from Last 3 Encounters:   02/15/18 105.9 kg (233 lb 6.4 oz)   01/29/18 104.1 kg (229 lb 8 oz)   02/07/17 107.1 kg (236 lb 3.2 oz)                 Today's Medication Changes          These changes are accurate as of 2/15/18  3:43 PM.  If you have any questions, ask your nurse or doctor.               Stop taking these medicines if you haven't already. Please contact your care team if you have questions.     progesterone (in sesame oil) 50 MG/ML injection   Stopped by:  Agnes Wheeler MD                    Primary Care Provider Office Phone # Fax #    Shannon Jerri Holder [phone]       [facility] [address] [facility] [address]        Equal Access to Services     [facility]: Hadii cindy neffo Charly, waaxda luqadaha, qaybta kaalmada carlos, luba shoemaker . So [facility] [phone].    ATENCIÓN: Si habla español, tiene a figueroa disposición servicios gratuitos de asistencia lingüística. Adiame al [phone].    We comply with applicable federal civil rights laws and [place] laws. We do not discriminate on the basis of race, color, national origin, age, disability, sex, sexual orientation, or gender identity.            Thank you!     Thank you for choosing [facility]  for your care. Our goal is always to provide you with excellent care. Hearing back from our patients is one way we can  continue to improve our services. Please take a few minutes to complete the written survey that you may receive in the mail after your visit with us. Thank you!             Your Updated Medication List - Protect others around you: Learn how to safely use, store and throw away your medicines at www.disposemymeds.org.          This list is accurate as of 2/15/18  3:43 PM.  Always use your most recent med list.                   Brand Name Dispense Instructions for use Diagnosis    aspirin 81 MG tablet      Take by mouth daily        FLONASE 50 MCG/ACT spray   Generic drug:  fluticasone     1 Package    Spray 1-2 sprays into both nostrils daily    Allergic rhinitis       FOLIC ACID PO      Take 800 mcg by mouth daily        ketotifen 0.025 % Soln ophthalmic solution    ZADITOR/REFRESH ANTI-ITCH     Place 1 drop into both eyes 2 times daily        mometasone 0.1 % cream    ELOCON     Apply topically daily        PRENATAL VITAMINS PO      Take  by mouth daily.    Flu vaccine need       psyllium 58.6 % Powd    METAMUCIL     Take by mouth daily        SINUS RINSE BOTTLE KIT NA      Spray 1 packet in nostril as needed        VITAMIN D-3 PO

## 2018-02-15 NOTE — PROGRESS NOTES
NOB Visit 2/15/18    HPI: Patient is a 36 yo  @ 11w5d who presents today for NOB Visit.  Patient's pregnancy is complicated by recurrent pregnancy loss history, Factor V Leiden heterozygote, history of kidney donation to father, and AMA.  Patient really overall has been doing well.  Fatigue is biggest issue, but feels doing ok overall from this perspective.  Denies any n/v.  No pelvic pain or bleeding.  No concerning discharge.  Denies HA, scotoma, SOB, RUQ pain or increased swelling in her extremities.  She recently completed her progesterone which she took until 10 weeks gestation.  Taking ASA daily.  Started taking Vitamin D supplementation after NOB labs with deficiency.    : 3 SAB, 1 MAB requiring D&C for bleeding    Past Medical History:   Diagnosis Date     Factor V Leiden (H)      Rhinitis, allergic      Past Surgical History:   Procedure Laterality Date     DILATION AND CURETTAGE SUCTION N/A 10/31/2015    Procedure: DILATION AND CURETTAGE SUCTION;  Surgeon: Agnes Wheeler MD;  Location: [facility] OR     EXTRACTION(S) DENTAL       NEPHRECTOMY  2010    left; donor to father     Medications:  aspirin 81 MG tablet Take by mouth daily   FOLIC ACID PO Take 800 mcg by mouth daily   mometasone (ELOCON) 0.1 % cream Apply topically daily   Hypertonic Nasal Wash (SINUS RINSE BOTTLE KIT NA) Spray 1 packet in nostril as needed   psyllium (METAMUCIL) 58.6 % POWD Take by mouth daily   ketotifen (ZADITOR/REFRESH ANTI-ITCH) 0.025 % SOLN Place 1 drop into both eyes 2 times daily   PRENATAL VITAMINS PO Take  by mouth daily.   fluticasone (FLONASE) 50 MCG/ACT nasal spray Spray 1-2 sprays into both nostrils daily     Allergies   Allergen Reactions     Erythromycin      Penicillins      Sulfa Drugs      Cefdinir Hives and Rash     Social History   Substance Use Topics     Smoking status: Never Smoker     Smokeless tobacco: Never Used     Alcohol use No      Comment: atopped in pregnancy     Family History  "  Problem Relation Age of Onset     C.A.D. Maternal Grandfather      Hypertension Maternal Grandfather      DIABETES Maternal Grandmother      KIDNEY DISEASE Father      transplant     Breast Cancer Maternal Aunt      Breast Cancer Maternal Aunt      Other Cancer Maternal Aunt      prancreatic ca     Breast Cancer Paternal Grandmother      Infertility Sister      currently pregnant wtih IVF     ROS: A complete 10 point ROS was conducted and was negative aside of that noted in the HPI    O:  Vitals:    02/15/18 0925   BP: 126/83   Pulse: 94   Weight: 105.9 kg (233 lb 6.4 oz)   Height: 1.753 m (5' 9\")     General: NAD, A&Ox3  Resp: Non-labored breathing    US: +  bpm, difficult to hear with Doppler    A/P: 39 yo  @ 11w5d by early US presents for NOB visit  1) Prenatal care: Rh positive, Tiffany negative, Rubella immune, Remainder NOB labs wnl  2) AMA/Genetic screening: Patient had genetic counseling and MFM visit scheduled next week, will likely proceed with NIPT, AFP at 15-21 weeks, Level II US at 18-20 weeks  3) Screening for malignant neoplasm of cervix: History of LSIL/CHRIS-1 in .  Has NILM pap in  and NILM/HPV negative pap in 2015, not due until 2020.  4) Factor V Leiden heterozygote: Patient seen by Dr. Smart in the past.  Had negative APS testing.  Is on baby ASA currently.  No personal history of clot.  Plan for 6 weeks Lovenox 40 mg daily postpartum and patient agreeable with this plan, declines during prenatal care as not studies to show in her case beneficial.  5) History of kidney donation: Given to her father in past in  without altered kidney function.  Will get baseline creatinine and Urine Pr:Cr given AMA and this history today  6) Vitamin D deficiency: On supplementation, will check level again at EOB visit  7) s/p flu shot  8) RTC in 4 weeks for EVELYN visit    Agnes Wheeler MD  "

## 2018-02-15 NOTE — LETTER
2/15/2018       RE: Ruma Vega  [address] [facility] [address]     Dear Colleague,    Thank you for referring your patient, Ruma Vega, to the [facility] at [facility]. Please see a copy of my visit note below.    NOB Visit 2/15/18    HPI: Patient is a 38 yo  @ 11w5d who presents today for NOB Visit.  Patient's pregnancy is complicated by recurrent pregnancy loss history, Factor V Leiden heterozygote, history of kidney donation to father, and AMA.  Patient really overall has been doing well.  Fatigue is biggest issue, but feels doing ok overall from this perspective.  Denies any n/v.  No pelvic pain or bleeding.  No concerning discharge.  Denies HA, scotoma, SOB, RUQ pain or increased swelling in her extremities.  She recently completed her progesterone which she took until 10 weeks gestation.  Taking ASA daily.  Started taking Vitamin D supplementation after NOB labs with deficiency.    : 3 SAB, 1 MAB requiring D&C for bleeding    Past Medical History:   Diagnosis Date     Factor V Leiden (H)      Rhinitis, allergic      Past Surgical History:   Procedure Laterality Date     DILATION AND CURETTAGE SUCTION N/A 10/31/2015    Procedure: DILATION AND CURETTAGE SUCTION;  Surgeon: Agnes Wheeler MD;  [facility]: [facility]     EXTRACTION(S) DENTAL       NEPHRECTOMY  2010    left; donor to father     Medications:  aspirin 81 MG tablet Take by mouth daily   FOLIC ACID PO Take 800 mcg by mouth daily   mometasone (ELOCON) 0.1 % cream Apply topically daily   Hypertonic Nasal Wash (SINUS RINSE BOTTLE KIT NA) Spray 1 packet in nostril as needed   psyllium (METAMUCIL) 58.6 % POWD Take by mouth daily   ketotifen (ZADITOR/REFRESH ANTI-ITCH) 0.025 % SOLN Place 1 drop into both eyes 2 times daily   PRENATAL VITAMINS PO Take  by mouth daily.   fluticasone (FLONASE) 50 MCG/ACT nasal spray Spray 1-2 sprays into both nostrils daily  "    Allergies   Allergen Reactions     Erythromycin      Penicillins      Sulfa Drugs      Cefdinir Hives and Rash     Social History   Substance Use Topics     Smoking status: Never Smoker     Smokeless tobacco: Never Used     Alcohol use No      Comment: atopped in pregnancy     Family History   Problem Relation Age of Onset     C.A.D. Maternal Grandfather      Hypertension Maternal Grandfather      DIABETES Maternal Grandmother      KIDNEY DISEASE Father      transplant     Breast Cancer Maternal Aunt      Breast Cancer Maternal Aunt      Other Cancer Maternal Aunt      prancreatic ca     Breast Cancer Paternal Grandmother      Infertility Sister      currently pregnant [facility] IVF     ROS: A complete 10 point ROS was conducted and was negative aside of that noted in the HPI    O:  Vitals:    02/15/18 0925   BP: 126/83   Pulse: 94   Weight: 105.9 kg (233 lb 6.4 oz)   Height: 1.753 m (5' 9\")     General: NAD, A&Ox3  Resp: Non-labored breathing    US: +  bpm, difficult to hear with Doppler    A/P: 37 yo  @ 11w5d by early US presents for NOB visit  1) Prenatal care: Rh positive, Tiffany negative, Rubella immune, Remainder NOB labs wnl  2) AMA/Genetic screening: Patient had genetic counseling and MFM visit scheduled next week, will likely proceed with NIPT, AFP at 15-21 weeks, Level II US at 18-20 weeks  3) Screening for malignant neoplasm of cervix: History of LSIL/CHRIS-1 in .  Has NILM pap in  and NILM/HPV negative pap in 2015, not due until 2020.  4) Factor V Leiden heterozygote: Patient seen by Dr. Smart in the past.  Ha d negative APS testing.  Is on baby ASA currently.  No personal history of clot.  Plan for 6 weeks Lovenox 40 mg daily postpartum and patient agreeable with this plan, declines during prenatal care as not studies to show in her case beneficial.  5) History of kidney donation: Given to her father in past in  without altered kidney function.  Will get baseline creatinine and " Urine Pr:Cr given AMA and this history today  6) Vitamin D deficiency: On supplementation, will check level again at EOB visit  7) s/p flu shot  8) RTC in 4 weeks for EVELYN visit    Agnes Wheeler MD

## 2018-02-20 ENCOUNTER — OFFICE VISIT (OUTPATIENT)
Dept: MATERNAL FETAL MEDICINE | Facility: CLINIC | Age: 38
End: 2018-02-20
Attending: OBSTETRICS & GYNECOLOGY
Payer: COMMERCIAL

## 2018-02-20 ENCOUNTER — HOSPITAL ENCOUNTER (OUTPATIENT)
Dept: ULTRASOUND IMAGING | Facility: CLINIC | Age: 38
Discharge: HOME | End: 2018-02-20
Attending: OBSTETRICS & GYNECOLOGY | Admitting: OBSTETRICS & GYNECOLOGY
Payer: COMMERCIAL

## 2018-02-20 DIAGNOSIS — O09.529 AMA (ADVANCED MATERNAL AGE) MULTIGRAVIDA 35+: Primary | ICD-10-CM

## 2018-02-20 DIAGNOSIS — D68.51 FACTOR V LEIDEN (H): ICD-10-CM

## 2018-02-20 DIAGNOSIS — O26.20 RECURRENT PREGNANCY LOSS, CURRENTLY PREGNANT: ICD-10-CM

## 2018-02-20 DIAGNOSIS — O09.521 ELDERLY MULTIGRAVIDA IN FIRST TRIMESTER: ICD-10-CM

## 2018-02-20 DIAGNOSIS — O09.529 AMA (ADVANCED MATERNAL AGE) MULTIGRAVIDA 35+: ICD-10-CM

## 2018-02-20 PROCEDURE — 36415 COLL VENOUS BLD VENIPUNCTURE: CPT | Performed by: OBSTETRICS & GYNECOLOGY

## 2018-02-20 PROCEDURE — 76813 OB US NUCHAL MEAS 1 GEST: CPT

## 2018-02-20 PROCEDURE — 96040 ZZH GENETIC COUNSELING, EACH 30 MINUTES: CPT | Mod: ZF | Performed by: GENETIC COUNSELOR, MS

## 2018-02-20 PROCEDURE — 88264 CHROMOSOME ANALYSIS 20-25: CPT | Performed by: OBSTETRICS & GYNECOLOGY

## 2018-02-20 PROCEDURE — 40000791 ZZHCL STATISTIC VERIFI PRENATAL TRISOMY 21,18,13: Performed by: OBSTETRICS & GYNECOLOGY

## 2018-02-20 PROCEDURE — 88230 TISSUE CULTURE LYMPHOCYTE: CPT | Performed by: OBSTETRICS & GYNECOLOGY

## 2018-02-20 PROCEDURE — 88289 CHROMOSOME STUDY ADDITIONAL: CPT | Performed by: OBSTETRICS & GYNECOLOGY

## 2018-02-20 NOTE — MR AVS SNAPSHOT
After Visit Summary   2/20/2018    Ruma Vega    MRN: [id number]           Patient Information     Date Of Birth          1980        Visit Information        Provider Department      2/20/2018 9:30 AM Leobardo Damon MD [facility] Maternal Fetal Medicine [facility]        Today's Diagnoses     Elderly multigravida in first trimester        Factor V Leiden (H)        Recurrent pregnancy loss, currently pregnant           Follow-ups after your visit        Your next 10 appointments already scheduled     Apr 04, 2018  8:00 AM CDT   (Arrive by 7:45 AM)   MFM US COMP with [id number]   [facility] Maternal Fetal Medicine Ultrasound - [facility])    [address]  [facility] [id number] [phone]           Wear comfortable clothes and leave your valuables at home.            Apr 04, 2018  8:30 AM CDT   Radiology MD with UR PERLA BLAKE   [facility] Maternal Fetal Medicine - [facility])    [address]  [facility] [address]   [phone]           Please arrive at the time given for your first appointment. This visit is used internally to schedule the physician's time during your ultrasound.              Future tests that were ordered for you today     Open Future Orders        Priority Expected Expires Ordered    MFM US Comprehensive Single Routine  12/20/2018 2/20/2018            Who to contact     If you have questions or need follow up information about today's clinic visit or your schedule please contact [facility] directly at [phone].  Normal or non-critical lab and imaging results will be communicated to you by MyChart, letter or phone within 4 business days after the clinic has received the results. If you do not hear from us within 7 days, please contact the clinic through MyChart or phone. If you have a critical or abnormal lab result, we  will notify you by phone as soon as possible.  Submit refill requests through [employer] or call your pharmacy and they will forward the refill request to us. Please allow 3 business days for your refill to be completed.          Additional Information About Your Visit        [employer] Information     [employer] gives you secure access to your electronic health record. If you see a primary care provider, you can also send messages to your care team and make appointments. If you have questions, please call your primary care clinic.  If you do not have a primary care provider, please call [phone] and they will assist you.        Care EveryWhere ID     This is your Care EveryWhere ID. This could be used by other organizations to access your [facility] medical records  [id number]         Blood Pressure from Last 3 Encounters:   02/15/18 126/83   02/07/17 (!) 154/95   06/27/16 (!) 152/94    Weight from Last 3 Encounters:   02/15/18 105.9 kg (233 lb 6.4 oz)   01/29/18 104.1 kg (229 lb 8 oz)   02/07/17 107.1 kg (236 lb 3.2 oz)              We Performed the Following     MFM Office Visit        Primary Care Provider Office Phone # Fax #    Shannon Jerri Holder [phone]       [facility] [address] [facility] [address]        Equal Access to Services     JOSÉ ANTONIO WESTFALL : Hadii cindy ku hadasho Soomaali, waaxda luqadaha, qaybta kaalmada adeegjose, luba garcia. So [facility] [phone].    ATENCIÓN: Si habla español, tiene a figueroa disposición servicios gratuitos de asistencia lingüística. Llame al [phone].    We comply with applicable federal civil rights laws and [place] laws. We do not discriminate on the basis of race, color, national origin, age, disability, sex, sexual orientation, or gender identity.            Thank you!     Thank you for choosing [facility]  for your care. Our goal is always to provide you with excellent  care. Hearing back from our patients is one way we can continue to improve our services. Please take a few minutes to complete the written survey that you may receive in the mail after your visit with us. Thank you!             Your Updated Medication List - Protect others around you: Learn how to safely use, store and throw away your medicines at www.disposemymeds.org.          This list is accurate as of 2/20/18  3:07 PM.  Always use your most recent med list.                   Brand Name Dispense Instructions for use Diagnosis    aspirin 81 MG tablet      Take by mouth daily        FLONASE 50 MCG/ACT spray   Generic drug:  fluticasone     1 Package    Spray 1-2 sprays into both nostrils daily    Allergic rhinitis       FOLIC ACID PO      Take 800 mcg by mouth daily        ketotifen 0.025 % Soln ophthalmic solution    ZADITOR/REFRESH ANTI-ITCH     Place 1 drop into both eyes 2 times daily        mometasone 0.1 % cream    ELOCON     Apply topically daily        PRENATAL VITAMINS PO      Take  by mouth daily.    Flu vaccine need       psyllium 58.6 % Powd    METAMUCIL     Take by mouth daily        SINUS RINSE BOTTLE KIT NA      Spray 1 packet in nostril as needed        VITAMIN D-3 PO

## 2018-02-20 NOTE — PROGRESS NOTES
Please see full imaging report from ViewPoint program under imaging tab.        Leobardo Damon MD  Maternal Fetal Medicine

## 2018-02-20 NOTE — PROGRESS NOTES
[facility] Maternal Fetal Medicine Center  Genetic Counseling Consult    Patient: Ruma Vega YOB: 1980   Date of Service: 2/20/18      Ruma Vega was seen at [facility] Maternal Fetal Medicine Center for genetic consultation to discuss the options for screening and testing for fetal chromosome abnormalities.  The indication for genetic counseling is advanced maternal age. Ruma was accompanied to her appointment today by her  Nicola.       Impression/Plan:   1.  Ruma had an ultrasound and blood draw for NIPT (Innatal test through [facility]).  Results are expected within 7-10 days, and will be available in [employer].  We will contact her to discuss the results, and a copy will be forwarded to the office of the referring OB provider.  Ruma requests that detailed results, including fetal sex indicated by testing, are left in her voicemail if she cannot be reached.     2.  Given the couple's history of recurrent pregnancy loss with no known explanation, both Ruma and Nicola had blood drawn for whole chromosome analysis, to assess for chromosomal rearrangements as a possible explanation.  These results are expected in approximately 14 days, and we will contact Ruma and Nicola to discuss results.  Both individuals completed consent to communicate with their spouse to facilitate communication moving forward.     3.  Ruma had a consultation with [facility] physician Dr. Damon and her team today to discuss pregnancy management recommendations given her known Factor V Leiden heterozygous mutation status and history of recurrent loss.  Please see her note for comprehensive details.      4.  Maternal serum AFP (single marker screen) is recommended after 15 weeks to screen for open neural tube defects. A quad screen should not be performed.    5.  An 18-20 week comprehensive ultrasound is standard of care for all women 35 or older at delivery.    Pregnancy  History:   /Parity:    Age at Delivery: 37 year old  CHRISTINA: 2018, by Other Basis  Gestational Age: 12w3d    No significant complications or exposures were reported in the current pregnancy.    Ruma s pregnancy history is significant for 4 prior first trimester miscarriages, all with no known cause.    Medical History:   Ruma s reported medical history is not expected to impact pregnancy management or risks to fetal development.       Family History:   A three-generation pedigree was obtained at a previous genetic counseling appointment, and is scanned under the  Media  tab. This pedigree was reviewed today and the couple reports no significant updates.      Otherwise, the reported family history is negative for multiple miscarriages, stillbirths, birth defects, cognitive impairment, known genetic conditions, and consanguinity.       Carrier Screening:   The patient reports that she and the father of the pregnancy have  ancestry:      Cystic fibrosis is an autosomal recessive genetic condition that occurs with increased frequency in individuals of  ancestry and carrier screening for this condition is available.  In addition,  screening in the [facility] includes cystic fibrosis.      Expanded carrier screening for mutations in a large panel of genes associated with autosomal recessive conditions including cystic fibrosis, spinal muscular atrophy, and others, is now available.      The patient has had previous carrier screening for approximately 300 recessive genetic conditions, per patient report, the results of which were negative.  A copy of the report was not available for our review today.          Recurrent Pregnancy Loss Discussion:        The majority of today's appointment was spent discussing family history and possible genetic causes for recurrent pregnancy loss.  We discussed that one genetic cause for pregnancy loss is what is called a chromosome  rearrangement.  Chromosomes are the packages of genetic information that we inherit from our parents, with each parent contributing 23 chromosomes for a total of 46.  It is possible for chromosomes to exchange or rearrange parts of themselves, resulting in what is called a translocation.  When an individual has a translocation that retains all of the necessary pieces of DNA, just in different locations, this is called a balanced translocation. For example, if an individual has a balanced translocation between chromosomes 1 and 2, they have 1 normal copy of chromosome 1, 1 normal copy of chromosome 2, a copy of 1 that has swapped pieces with chromosome 2, and the chromosome 2 that has that piece of 1. Balanced translocations typically do not cause any detectable health problems in the individual, but can be a cause for difficult reproductive outcomes such as recurrent miscarriage.     When an individual's body produces eggs or sperm, it splits the number of chromosome present in a cell in half, so that they only passes on half of their genetic information to a pregnancy, with the other half coming from the egg/sperm produced by their partner.  During this process, called segregation, there are number of possible outcomes for individuals with a balanced translocation:     1.  An egg/sperm receives the normal copy of each chromosome, and there are no complications expected.        2.  An egg/sperm receives both chromosomes involved in the translocation.  This egg/sperm would have the expected amount of chromosome material, just rearranged.  A pregnancy conceived in this scenario would be a balanced translocation carrier, the same as the parent.       3.  An egg receives an unbalanced amount of chromosome material, receiving the normal copy of chromosome pair, and a translocation copy.  For our example an individual could receive the normal copy of chromosome 1, and the copy of chromosome 2 that is missing part of 2 and  has a piece of chromosome 1 attached to it.  This results in a net loss in part of chromosome 2 and a net gain in part of chromosome 1.  Other unbalanced segregations are possible as well.     Extra or missing pieces of DNA are associated with a wide range of outcomes, including miscarriage, still birth, birth defects, and cognitive impairment.  The specific extra or missing portions of DNA play a role in determining the severity of outcome.  In general, the larger the amount of DNA involved, the more severe the outcome (the larger the loss or gain of DNA, the more likely a pregnancy is to miscarry instead of carrying to term and having health concerns after delivery).    Being a carrier of a balanced translocation is just one of many causes for recurrent pregnancy loss, but it is a possible explanation for Ruma's history of reproductive loss.  We discussed that assessment for these translocations involves testing the parents chromosomes, to see if they have any of these rearrangements present that could be impacting pregnancy.  Because both men and women can carry balanced translocations, it is possible that either parent could be have a translocation, and for this reason both parents consented to be tested at the conclusion of their appointments today.    Risk Assessment for Chromosome Conditions:   We explained that the risk for fetal chromosome abnormalities increases with maternal age. We discussed specific features of common chromosome abnormalities, including Down syndrome, trisomy 13, trisomy 18, and sex chromosome trisomies.      - At age 37 at midtrimester, the risk to have a baby with Down syndrome is 1 in 129.     - At age 37 at midtrimester, the risk to have a baby with any chromosome abnormality is 1 in 65.     - At age 37 at delivery, the risk to have a baby with Down syndrome is 1 in 175.     - At age 37 at delivery, the risk to have a baby with any chromosome abnormality is 1 in 103.     Testing  Options:   We discussed the following options:     First trimester screening    First trimester ultrasound with nuchal translucency and nasal bone assessments, maternal plasma hCG, BLAKE-A, and AFP measurement    Screens for fetal trisomy 21, trisomy 13, and trisomy 18    Cannot screen for open neural tube defects; maternal serum AFP after 15 weeks is recommended       Non-invasive Prenatal Testing (NIPT)    Maternal plasma cell-free DNA testing; first trimester ultrasound with nuchal translucency and nasal bone assessment is recommended, when appropriate    Screens for fetal trisomy 21, trisomy 13, trisomy 18, and sex chromosome aneuploidy    Cannot screen for open neural tube defects; maternal serum AFP after 15 weeks is recommended       Chorionic villus sampling (CVS)    Invasive procedure typically performed in the first trimester by which placental villi are obtained for the purpose of chromosome analysis and/or other prenatal genetic analysis    Diagnostic results; >99% sensitivity for fetal chromosome abnormalities    Cannot test for open neural tube defects; maternal serum AFP after 15 weeks is recommended       Genetic Amniocentesis    Invasive procedure typically performed in the second trimester by which amniotic fluid is obtained for the purpose of chromosome analysis and/or other prenatal genetic analysis    Diagnostic results; >99% sensitivity for fetal chromosome abnormalities    AFAFP measurement tests for open neural tube defects       Comprehensive (Level II) ultrasound:     Detailed ultrasound performed between 18-22 weeks gestation    Screen for major birth defects and markers for aneuploidy.        We reviewed the benefits and limitations of this testing.  Screening tests provide a risk assessment specific to the pregnancy for certain fetal chromosome abnormalities, but cannot definitively diagnose or exclude a fetal chromosome abnormality.  Follow-up genetic counseling and consideration of  diagnostic testing is recommended with any abnormal screening result.     Diagnostic tests carry inherent risks- including risk of miscarriage- that require careful consideration.  These tests can detect fetal chromosome abnormalities with greater than 99% certainty.  Results can be compromised by maternal cell contamination or mosaicism, and are limited by the resolution of cytogenetic G-banding technology.  There is no screening nor diagnostic test that can detect all forms of birth defects or mental disability.     It was a pleasure to be involved with Ruma s care. Face-to-face time of the meeting was 60 minutes.      Junior Jonas MS, [facility]  Licensed Genetic Counselor  Phone: [phone]  Pager: [phone]

## 2018-02-20 NOTE — NURSING NOTE
Ruma here for GC for screening discussion, NT U/S and MFM consult due to preg c/b AMA and pt donated 1 kidney.  Pt to the lab and left amb and stable Agnes Maharaj RN

## 2018-02-20 NOTE — PROGRESS NOTES
Maternal Fetal Medicine Consult Note    Name: Ruma Vega  : 1980  MRN: [id number]    Date: 2018    Referring Physician:  Dr. Wheeler  Reason for Referral: FVL Heterozygote, Kidney donor, Recurrent pregnancy loss (currently pregnant), and AMA    Dear Dr. Wheeler,    Thank you very much for your request for consultation regarding management of Ms. Ruma Vega, whose current pregnancy is complicated by a history of recurrent pregnancy loss, advanced maternal age, FVL Heterozygote and Kidney donation.     I spent a total of 30 minutes with Ruma during today's visit, with > 50% of that time spent in face to face consultation discussing her medical comorbidities in pregnancy and history of recurrent pregnancy loss.       Please see Imaging section for results of ultrasound evaluation performed today.    HPI: Ms. Ruma Vega is a 37 year old  at 12w3d by 9w1d ultrasound. She has a history of recurrent pregnancy loss, and this is currently her 5th pregnancy. [facility] and has undergone both IVF and IUI cycles, however her current pregnancy is a spontaneous conception (with medication intervention per CRM). She has had a negative HSG, normal carrier testing panel (awaiting records), and negative APLS testing. She was unable to have genetic testing completed from the D&C specimen in  due to poor tissue quality. She has not had testing on her other spontaneous losses. Neither she or her partner have had karyotyping performed.They plan to have this testing today.     Ms. Vega's history is also notable for Factor V Leiden deficiency heterozygosity, she was diagnosed after her father had multiple provoked VTEs (around time of his kidney transplant). She has not had any personal history of VTE, nor is there any other family history of VTE. She was seen by Dr. Smart last year () who suggested 6 weeks of post partum prophylaxis with  lovenox. She currently is taking a daily 81 mg aspirin.     Ruma is a kidney donor to her father, who lost his kidney due to  Nephrotoxic effects from chronic lithium use. This occurred in 2010. She reports that she always has a mildly elevated creatinine, but does not report any other complications either from, or since the time of the procedure. Her baseline creatinine, P/C ratio, AST and plt were normal. She did have a mildly elevated ALT of 52 (normal 50). She does not report any recent GI distress or antibiotic use. Of note, she did report that her mother had what was likely an acute hepatitis due to antibiotic use for which she had to be hospitalized.     History:   ObHx:    G1-G4 - 9660-3167, 2 IVF/2 spontaneous conceptions, all prior  to 9 weeks; D&C in 2015   G5 - current, spontaneous  GynHx: recurrent pregnancy loss, h/o LSIL pap 2010 (most recent 2015 wnl), no h/o STDs/cervical surgeries.     PMHx: Kidney donor, FVL Heterozygote  PSHx: D&C  Meds: Daily 81mg ASA  Allergies:  PCN/erythromycin, sulfa, cefdinir - per patient hives with all  SocHx:   Neg etoh/tob/drugs  FamHx: Father with bipolar disorder on lithium/kidney transplant recipient, FVL heterozygote - h/o provoked VTE, no other family history of blood clotting disorders    Impression:    1. Factor V Leiden deficiency - Heterozygote  We discussed that inherited thrombophilas, especially Factor V leiden, is very common.  This genetic mutation is present in about 5% of the  population.  The risk of venous thromboembolism among pregnant women who are heterozygous for factor V Leiden without a personal history of venous thromboembolism or an affected first-degree relative with a thrombotic episode before age 50 years is less than 0.3%.  However, the risk of a venous thromboembolic event during pregnancy with a history of prior VTE can be up to 10%.  About 5% of the VTEs will occur antepartum and about 5% will occur in the postpartum period.   The ACOG practice bulletin on inherited thrombophilias recommends universal postpartum VTE prophylaxis, for which Ms. Vega has been previously counseled.  Antepartum prophylaxis with low molecular weight heparin is an option as well, as is close surveillance for venous thromboembolism. There has been conflicting literature regarding whether patients with inherited thrombophilias can develop placental thrombosis leading to IUGR. A definitive causal link cannot be made between factor V leiden and adverse pregnancy outcomes as there is insufficient evidence.        2. Kidney Donation  We discussed the risks of pregnancy in living kidney donors, especially in women, such as Ruma, with normal renal function.   In an extensive review of the literature, most women had uncomplicated pregnancies after donation.  In a recent [place] Journal of Medicine article (Mimi et al), in a retrospective of 85 women with 131 pregnancies matched with healthy non-donors from the general population, gestational hypertension or preeclampsia was more common among living kidney donors than among non-donors (occurring in 15 of 131 pregnancies [11%] vs. 38 of 788 pregnancies [5%]; OR for donors, 2.4.  There were no significant differences between donors and non-donors with respect to rates of  birth (8% and 7%) or low birth weight (6% and 4%).  There were no reports of maternal death, stillbirth, or  death among the donors.  Previous studies have examined pregnancy outcomes after living kidney donation which both had similar results.  One from [place] (Barrera et al) compared outcomes in a group of women who were pregnant before donation with outcomes in a group of women who were pregnant after donation with similar results.  Another study from [place] (Nemo et al) surveyed donors by asking them to recall outcomes years after pregnancy.  Post-donation pregnancies were associated with a lower likelihood of full term  deliveries (73.7% vs 84.6%) and a higher likelihood of fetal loss (19.2% vs. 11.3%).  Post donation pregnancies were also associated with a higher risk of gestational diabetes (2.7% vs. 0.7%), gestational hypertension (5.7% vs 0.6%), proteinuria (4.3% vs. 1.1%) and preeclampsia (5.5% vs. 0.8%). Overall, they found fetal and maternal outcomes and pregnancy outcomes after kidney donation were similar to those reported in the general population, but inferior to pre donation pregnancy outcomes.     We discussed the importance of avoiding nephrotoxic medications and antibiotics in pregnancy. We also discussed signs and symptoms of preeclampsia. Ms. Vega has a blood pressure cuff at home to follow her blood pressures. We discussed checking weekly or if she notes any preeclamptic symptoms, at which point she should contact her primary physician.    3. Advanced Maternal Age  Finally, we also had a discussion with the patient regarding options, management, and expectations of pregnancy given her advanced maternal age.  Ms. Vega will be 38 years old at her expected due date. For women 39 years of age, the risk of Trisomy 21 at mid-pregnancy is 1/98, and for all aneuploidies 1/51.  She met with our genetic counselor today who reviewed options of non-invasive and invasive testing.  Non-invasive testing, including first trimester and quad screen, is typically offered earlier in pregnancy.  They also reviewed the option of Innatal, a non-invasive test detecting fetal DNA (NIPT screening) from maternal serum.  Invasive testing, which are diagnostic tests, includes options of CVS earlier in gestation and amniocentesis.  The patient desires Innatal (NIPT screening) testing and had that drawn today.      4. Recurrent pregnancy loss  Ms. Vega has had a thorough work up for her recurrent pregnancy loss, and has already had most of the testing we generally recommend. She is also farther along in this pregnancy than her past  pregnancies, with normal findings of today's ultrasound. As part of her conversation with genetic counseling, a discussion about obtaining parental karyotyping was had, for which Ms. Vega expressed interest. We would also recommend that genetic testing be performed, if able, on any future losses to look for a possible translocation or other genetic abnormality.        We have made the following recommendations:    - Repeating LFTs due to mild ALT elevation to ensure no upward trend or underlying liver disease  - Repeat HELLP labs qtrimester or prn as needed for signs/symptoms of preeclampsia.   - Monitor patient s blood pressure closely, recommend every two weeks from 20-30 weeks GA and then weekly from 30 weeks GA to delivery  - Avoid nephrotoxic medications, especially antibiotics, likely a consideration if she is GBS positive as she has an allergy to penicillin  - Continue daily low dose 81mg ASA  - Level II US at 18-22 weeks, with growth ultrasounds q4-6 weeks   - Early glucose testing due to advanced maternal age and Class I Obesity  - Post partum prophylaxis with 40mg Enoxaparin for 6 weeks post partum  - Consideration of prophylactic lovenox for any periods of significant immobility (such as long plane/car trips)    Thank you for the opportunity to participate in the care of this patient. If you have questions regarding today s evaluation or if we can be of further service, please contact the [facility].    I acted as a scribe for Dr. Damon during today's visit.     Shayla Bettencourt MD  Maternal Fetal Medicine Fellow    This note, as scribed, accurately reflects the examination, my impressions, and the plan as I discussed it with the patient.   Time spent in consultation as noted at the top of this consultation.     Leobardo Damon MD  Maternal Fetal Medicine

## 2018-02-20 NOTE — MR AVS SNAPSHOT
After Visit Summary   2/20/2018    Ruma Vega    MRN: [id number]           Patient Information     Date Of Birth          1980        Visit Information        Provider Department      2/20/2018 8:00 AM Junior Jonas GC [facility] Maternal Fetal Medicine [facility]        Today's Diagnoses     AMA (advanced maternal age) multigravida 35+    -  1    Recurrent pregnancy loss, currently pregnant        Factor V Leiden (H)        Elderly multigravida in first trimester           Follow-ups after your visit        Your next 10 appointments already scheduled     Apr 04, 2018  8:00 AM CDT   (Arrive by 7:45 AM)   [facility] US COMP with [id number]   [facility] Maternal Fetal Medicine Ultrasound - [facility])    [address]  [facility] [address] [phone]           Wear comfortable clothes and leave your valuables at home.            Apr 04, 2018  8:30 AM CDT   Radiology MD with UR JOEY BLAKE   [facility] Maternal Fetal Medicine - [facility])    [address]  [facility] [id number] [phone]           Please arrive at the time given for your first appointment. This visit is used internally to schedule the physician's time during your ultrasound.              Future tests that were ordered for you today     Open Future Orders        Priority Expected Expires Ordered    [facility] US Comprehensive Single Routine  12/20/2018 2/20/2018            Who to contact     If you have questions or need follow up information about today's clinic visit or your schedule please contact [facility] MATERNAL FETAL MEDICINE [facility] directly at [phone].  Normal or non-critical lab and imaging results will be communicated to you by MyChart, letter or phone within 4 business days after the clinic has received the results. If you do not hear from us within 7 days, please contact the clinic through [employer] or  phone. If you have a critical or abnormal lab result, we will notify you by phone as soon as possible.  Submit refill requests through [employer] or call your pharmacy and they will forward the refill request to us. Please allow 3 business days for your refill to be completed.          Additional Information About Your Visit        [employer] Information     [employer] gives you secure access to your electronic health record. If you see a primary care provider, you can also send messages to your care team and make appointments. If you have questions, please call your primary care clinic.  If you do not have a primary care provider, please call [phone] and they will assist you.        Care EveryWhere ID     This is your Care EveryWhere ID. This could be used by other organizations to access your [facility] medical records  [id number]         Blood Pressure from Last 3 Encounters:   02/15/18 126/83   02/07/17 (!) 154/95   06/27/16 (!) 152/94    Weight from Last 3 Encounters:   02/15/18 105.9 kg (233 lb 6.4 oz)   01/29/18 104.1 kg (229 lb 8 oz)   02/07/17 107.1 kg (236 lb 3.2 oz)              We Performed the Following     [facility] Genetic Counseling        Primary Care Provider Office Phone # Fax #    Shannon Jerri Holder [phone]       [facility] [address] [facility] [address]        Equal Access to Services     DEBRA [place]STFALL : Hadii cindy Parks, waaxda donna, qaybta kaalmamariama gonzalez, luba shoemaker . [facility] [phone].    ATENCIÓN: Si habla español, tiene a figueroa disposición servicios gratuitos de asistencia lingüística. Llame al [phone].    We comply with applicable federal civil rights laws and [place] laws. We do not discriminate on the basis of race, color, national origin, age, disability, sex, sexual orientation, or gender identity.            Thank you!     Thank you for choosing [facility] MATERNAL FETAL [facility]ICINE [facility]  for  your care. Our goal is always to provide you with excellent care. Hearing back from our patients is one way we can continue to improve our services. Please take a few minutes to complete the written survey that you may receive in the mail after your visit with us. Thank you!             Your Updated Medication List - Protect others around you: Learn how to safely use, store and throw away your medicines at www.disposemymeds.org.          This list is accurate as of 2/20/18  2:27 PM.  Always use your most recent med list.                   Brand Name Dispense Instructions for use Diagnosis    aspirin 81 MG tablet      Take by mouth daily        FLONASE 50 MCG/ACT spray   Generic drug:  fluticasone     1 Package    Spray 1-2 sprays into both nostrils daily    Allergic rhinitis       FOLIC ACID PO      Take 800 mcg by mouth daily        ketotifen 0.025 % Soln ophthalmic solution    ZADITOR/REFRESH ANTI-ITCH     Place 1 drop into both eyes 2 times daily        mometasone 0.1 % cream    ELOCON     Apply topically daily        PRENATAL VITAMINS PO      Take  by mouth daily.    Flu vaccine need       psyllium 58.6 % Powd    METAMUCIL     Take by mouth daily        SINUS RINSE BOTTLE KIT NA      Spray 1 packet in nostril as needed        VITAMIN D-3 PO

## 2018-02-27 ENCOUNTER — TELEPHONE (OUTPATIENT)
Dept: MATERNAL FETAL MEDICINE | Facility: CLINIC | Age: 38
End: 2018-02-27

## 2018-02-27 DIAGNOSIS — O09.521 ELDERLY MULTIGRAVIDA IN FIRST TRIMESTER: ICD-10-CM

## 2018-02-27 DIAGNOSIS — O26.20 RECURRENT PREGNANCY LOSS, CURRENTLY PREGNANT: ICD-10-CM

## 2018-02-27 LAB — LAB SCANNED RESULT: NORMAL

## 2018-02-27 NOTE — TELEPHONE ENCOUNTER
2/27/2018       Called Ruma to discuss NIPT results.  Results came back negative for chromosome abnormalities in chromosomes 21, 18, & 13, as well as the sex chromosomes.  These test results do not definitively rule out the possibility of one of these conditions, but they do greatly reduce the likelihood.  Ruma asked about the residual risk after testing, and we discussed an approximate risk for any of these conditions of below 1/10,000, although exact negative predictive values were not calculated.  NIPT was also ordered to assess for common microdeletion syndromes 1p36, 4p16.3, 5p15.2, 15q11.2, and 22q11.2.  The accuracy of NIPT for micro-deletions is less well established, but a negative result for these microdeletions does reduce the risk for her pregnancy to have any of these conditions.    The test identified sex chromosomes consistent with male sex (XY).      Ruma and her  Nicola had blood drawn for chromosome analysis based on their history of recurrent pregnancy loss with no known explanation.  These results are still pending and are expected within the next week or so.  Ruma and Nicola will be contacted to discuss as results become available.     Ruma reports that she is undecided as the whether or not she would like to proceed to having an amniocentesis after these negative results.  She reports that she plans to discuss with Nicola and the couple will make a decision regarding an amniocentesis once their chromosome results are returned.        Junior Jonas MS, [facility]  Licensed Genetic Counselor  Phone: [phone]  Pager: [phone]

## 2018-03-02 ENCOUNTER — TELEPHONE (OUTPATIENT)
Dept: MATERNAL FETAL MEDICINE | Facility: CLINIC | Age: 38
End: 2018-03-02

## 2018-03-02 DIAGNOSIS — O09.522 ELDERLY MULTIGRAVIDA IN SECOND TRIMESTER: ICD-10-CM

## 2018-03-02 DIAGNOSIS — O26.20 RECURRENT PREGNANCY LOSS, CURRENTLY PREGNANT: ICD-10-CM

## 2018-03-02 LAB — COPATH REPORT: NORMAL

## 2018-03-02 NOTE — TELEPHONE ENCOUNTER
3/2/2018     Called Ruma to discuss the results from his recent chromosome analysis.  Results are normal, female 46 XX.  These results do not provide an explanation for her and her partner Nicola's history of recurrent pregnancy loss.  Nicola and Ruma completed consent to communicate documentation to facilitate discussion of these results, and Nicola's results are normal, male, 46 XY.  This information was left in Ruma's voicemail as requested, and the couple was encouraged to reach out with any questions or concerns in the future.       During our previous conversation, Ruma reported being unsure about the possibility of pursuing an amniocentesis, and I encouraged her to contact me to discuss this topic further.      Junior Jonas MS, [facility]  Licensed Genetic Counselor  Phone: [phone]  Pager: [phone]

## 2018-04-02 ENCOUNTER — OFFICE VISIT (OUTPATIENT)
Dept: OBGYN | Facility: CLINIC | Age: 38
End: 2018-04-02
Attending: OBSTETRICS & GYNECOLOGY
Payer: COMMERCIAL

## 2018-04-02 VITALS
BODY MASS INDEX: 35.74 KG/M2 | SYSTOLIC BLOOD PRESSURE: 135 MMHG | HEART RATE: 92 BPM | WEIGHT: 242 LBS | DIASTOLIC BLOOD PRESSURE: 89 MMHG

## 2018-04-02 DIAGNOSIS — Z52.4 KIDNEY DONOR: ICD-10-CM

## 2018-04-02 DIAGNOSIS — O09.529 HIGH-RISK PREGNANCY, ELDERLY MULTIGRAVIDA, UNSPECIFIED TRIMESTER: Primary | ICD-10-CM

## 2018-04-02 DIAGNOSIS — L30.1 DYSHIDROTIC ECZEMA: ICD-10-CM

## 2018-04-02 DIAGNOSIS — D68.51 FACTOR V LEIDEN (H): ICD-10-CM

## 2018-04-02 LAB
ALT SERPL W P-5'-P-CCNC: 55 U/L (ref 0–50)
AST SERPL W P-5'-P-CCNC: 34 U/L (ref 0–45)
CREAT SERPL-MCNC: 0.83 MG/DL (ref 0.52–1.04)
ERYTHROCYTE [DISTWIDTH] IN BLOOD BY AUTOMATED COUNT: 13.8 % (ref 10–15)
GFR SERPL CREATININE-BSD FRML MDRD: 76 ML/MIN/1.7M2
HCT VFR BLD AUTO: 34.5 % (ref 35–47)
HGB BLD-MCNC: 11.9 G/DL (ref 11.7–15.7)
MCH RBC QN AUTO: 30.8 PG (ref 26.5–33)
MCHC RBC AUTO-ENTMCNC: 34.5 G/DL (ref 31.5–36.5)
MCV RBC AUTO: 89 FL (ref 78–100)
PLATELET # BLD AUTO: 356 10E9/L (ref 150–450)
RBC # BLD AUTO: 3.86 10E12/L (ref 3.8–5.2)
WBC # BLD AUTO: 12 10E9/L (ref 4–11)

## 2018-04-02 PROCEDURE — 82565 ASSAY OF CREATININE: CPT | Performed by: OBSTETRICS & GYNECOLOGY

## 2018-04-02 PROCEDURE — 36415 COLL VENOUS BLD VENIPUNCTURE: CPT | Performed by: OBSTETRICS & GYNECOLOGY

## 2018-04-02 PROCEDURE — 84450 TRANSFERASE (AST) (SGOT): CPT | Performed by: OBSTETRICS & GYNECOLOGY

## 2018-04-02 PROCEDURE — 82105 ALPHA-FETOPROTEIN SERUM: CPT | Performed by: OBSTETRICS & GYNECOLOGY

## 2018-04-02 PROCEDURE — 84460 ALANINE AMINO (ALT) (SGPT): CPT | Performed by: OBSTETRICS & GYNECOLOGY

## 2018-04-02 PROCEDURE — 85027 COMPLETE CBC AUTOMATED: CPT | Performed by: OBSTETRICS & GYNECOLOGY

## 2018-04-02 ASSESSMENT — PAIN SCALES - GENERAL: PAINLEVEL: NO PAIN (0)

## 2018-04-02 NOTE — PROGRESS NOTES
EVELYN Visit 18    S: Doing ok.  Did have an episode over the weekend of some abdominal but does believe this was more related to digestions issues than anything.  She does have some baseline constipation even pre-pregnancy that is assisted with Metamucil and she continues to take this and make food choices to assist with bowel regularity.  She was in [place] recently for a family wedding and did lots of walking.  Had been feeling baby boy a lot, but feels position must have changed as movement changed now.  Denies any contraction type pain, no VB or LOF.  Normal discharge.  Denies any HA, scotoma, SOB, RUQ pain or increased swelling in her extremities.  Did have episode recently of 30 minutes of her right thigh feeling numb.  She had no motor restrictions, it was all sensory loss related.  It resolved after 30 minutes but wanted to be sure nothing to worry about.  She does state she has had issues with herniated disc in the past and that she has had symptoms like this on her left leg related to this, but has not ever had on right side.  No continued symptoms at this time.  Patient with questions about monitoring blood pressure and levels to call about.  Also very appropriate questions about her ultrasound on Wednesday.  At this time, she believes if all looks well they will likely declined amniocentesis.  Patient also asking about travel and restrictions for this.  Ruma all appropriately asking about repeating labs today for her liver function.  Lastly, Ruma has a condition called dishydrotic eczema which tends to flare with temperature changes and activity changes.  After trip to [place] she had a flare and her symptoms are greatly assisted with mometasone cream which she wants to ensure is ok for her to use when she has these flares.    O:  See OB Flowsheet    A/P: 39 yo  @ 18w2d by early US presents for EVELYN visit  1) Prenatal care: Rh positive, Tiffany negative, Rubella immune, Remainder NOB labs  wnl  2) AMA/Genetic screening: Normal NIPT and Innatal microdeletion testing, AFP offered today and patient desires, Level II US scheduled in 2 days on 4/4/18, Having a BOY.  Did get baseline preeclampsia labs with slight elevation of ALT.  Plan to repeat today.  If still elevated consider RUQ US.    3) Borderline BP: Today we did discuss home BP Measurement and encouraged daily values and at different times of day.  Call with persistent BP > 140/90 and come in for evaluation with BP > 160/110.  Patient understands this recommendation  4) Screening for malignant neoplasm of cervix: History of LSIL/CHRIS-1 in 2010.  Has NILM pap in 2011 and NILM/HPV negative pap in 7/2015, not due until 7/2020.  5) Factor V Leiden heterozygote: Patient seen by Dr. Smart in the past.  Had negative APS testing.  Is on baby ASA currently.  No personal history of clot.  Plan for 6 weeks Lovenox 40 mg daily postpartum and patient agreeable with this plan, declines during prenatal care as not studies to show in her case beneficial.  6) History of kidney donation: Given to her father in past in 2010 without altered kidney function.  Had normal baseline creatinine and Urine Pr:Cr.  7) Vitamin D deficiency: On supplementation, will check level again at EOB visit  8) Dishydrotic eczema: Uses mometasone medium potency cream with flares.  Discussed low likelihood of any effect with intermittent application.  9) s/p flu shot  10) Right thigh numbness: One 30 minute episode, completely resolved.  Did discuss likely self-limiting, could have been position/activity related.  Discussed with continued episodes or change to loss of motor function to notify us immediately.  11) RTC in 4 weeks for EVELYN visit    Agnes Wheeler MD

## 2018-04-02 NOTE — MR AVS SNAPSHOT
After Visit Summary   4/2/2018    Ruma Vega    MRN: [id number]           Patient Information     Date Of Birth          1980        Visit Information        Provider Department      4/2/2018 1:30 PM Agnes Wheeler MD [facility]        Today's Diagnoses     High-risk pregnancy, elderly multigravida, unspecified trimester    -  1    Factor V Leiden (H)        Kidney donor        Dyshidrotic eczema           Follow-ups after your visit        Follow-up notes from your care team     Return in about 4 weeks (around 5/3/2018) for EVELYN Visit.      Your next 10 appointments already scheduled     Apr 04, 2018  8:00 AM CDT   (Arrive by 7:45 AM)   MFM US COMP with [id number]   [facility])    [address]  [facility] [address] [phone]           Wear comfortable clothes and leave your valuables at home.            Apr 04, 2018  8:30 AM CDT   Radiology MD with UR PERLA BLAKE   [facility]    [address]  [facility] [address] [phone]           Please arrive at the time given for your first appointment. This visit is used internally to schedule the physician's time during your ultrasound.            May 03, 2018 10:30 AM CDT   RETURN OB with Agnes Wheeler MD   [facility] ([facility])    [place] [facility] [address]  [address]  [facility] [address] [phone]              Who to contact     Please call your clinic at [phone] to:    Ask questions about your health    Make or cancel appointments    Discuss your medicines    Learn about your test results    Speak to your doctor            Additional Information About Your Visit        [employer] Information     [employer] gives you secure access to your electronic  health record. If you see a primary care provider, you can also send messages to your care team and make appointments. If you have questions, please call your primary care clinic.  If you do not have a primary care provider, please call [phone] and they will assist you.      [employer] is an electronic gateway that provides easy, online access to your medical records. With [employer], you can request a clinic appointment, read your test results, renew a prescription or communicate with your care team.     To access your existing account, please contact your [facility] or call [phone] for assistance.        Care EveryWhere ID     This is your Care EveryWhere ID. This could be used by other organizations to access your [facility] medical records  [id number]        Your Vitals Were     Pulse Breastfeeding? BMI (Body Mass Index)             92 No 35.74 kg/m2          Blood Pressure from Last 3 Encounters:   04/02/18 135/89   02/15/18 126/83   02/07/17 (!) 154/95    Weight from Last 3 Encounters:   04/02/18 109.8 kg (242 lb)   02/15/18 105.9 kg (233 lb 6.4 oz)   01/29/18 104.1 kg (229 lb 8 oz)              We Performed the Following     AFP - Alpha Fetoprotein Maternal Screen     ALT     AST     CBC with Platelets     Creatinine        Primary Care Provider Office Phone # Fax #    Shannon Jerri Holder [phone]       [facility] [address]  [facility] [address]        Equal Access to Services     JOSÉ ANTONIO WESTFALL : Hadii cindy Parks, waaxda donna, qaybta kaalmada carlos, luba garcia. So [facility] [phone].    ATENCIÓN: Si habla español, tiene a figueroa disposición servicios gratuitos de asistencia lingüística. Llame al [phone].    We comply with applicable federal civil rights laws and [place] laws. We do not discriminate on the basis of race, color, national origin, age, disability, sex, sexual  orientation, or gender identity.            Thank you!     Thank you for choosing [facility]  for your care. Our goal is always to provide you with excellent care. Hearing back from our patients is one way we can continue to improve our services. Please take a few minutes to complete the written survey that you may receive in the mail after your visit with us. Thank you!             Your Updated Medication List - Protect others around you: Learn how to safely use, store and throw away your medicines at www.disposemymeds.org.          This list is accurate as of 4/2/18 11:59 PM.  Always use your most recent med list.                   Brand Name Dispense Instructions for use Diagnosis    aspirin 81 MG tablet      Take by mouth daily        FLONASE 50 MCG/ACT spray   Generic drug:  fluticasone     1 Package    Spray 1-2 sprays into both nostrils daily    Allergic rhinitis       FOLIC ACID PO      Take 800 mcg by mouth daily        ketotifen 0.025 % Soln ophthalmic solution    ZADITOR/REFRESH ANTI-ITCH     Place 1 drop into both eyes 2 times daily        mometasone 0.1 % cream    ELOCON     Apply topically daily        PRENATAL VITAMINS PO      Take  by mouth daily.    Flu vaccine need       psyllium 58.6 % Powd    METAMUCIL     Take by mouth daily        SINUS RINSE BOTTLE KIT NA      Spray 1 packet in nostril as needed        VITAMIN D-3 PO

## 2018-04-02 NOTE — NURSING NOTE
Chief Complaint   Patient presents with     Prenatal Care     EVELYN 18 weeks and 2 days   Lyly Mayes LPN

## 2018-04-02 NOTE — LETTER
4/2/2018       RE: Ruma Vega  [address] [place] [address]     Dear Colleague,    Thank you for referring your patient, Ruma Vega, to the [facility] at [facility]. Please see a copy of my visit note below.    EVELYN Visit 4/2/18    S: Doing ok.  Did have an episode over the weekend of some abdominal but does believe this was more related to digestions issues than anything.  She does have some baseline constipation even pre-pregnancy that is assisted with Metamucil and she continues to take this and make food choices to assist with bowel regularity.  She was in [place] recently for a family wedding and did lots of walking.  Had been feeling baby boy a lot, but feels position must have changed as movement changed now.  Denies any contraction type pain, no VB or LOF.  Normal discharge.  Denies any HA, scotoma, SOB, RUQ pain or increased swelling in her extremities.  Did have episode recently of 30 minutes of her right thigh feeling numb.  She had no motor restrictions, it was all sensory loss related.  It resolved after 30 minutes but wanted to be sure nothing to worry about.  She does state she has had issues with herniated disc in the past and that she has had symptoms like this on her left leg related to this, but has not ever had on right side.  No continued symptoms at this time.  Patient with questions about monitoring blood pressure and levels to call about.  Also very appropriate questions about her ultrasound on Wednesday.  At this time, she believes if all looks well they will likely declined amniocentesis.  Patient also asking about travel and restrictions for this.  Ruma all appropriately asking about repeating labs today for her liver function.  Lastly, Ruma has a condition called dishydrotic eczema which tends to flare with temperature changes and activity changes.  After trip to [place] she had a flare and her  symptoms are greatly assisted with mometasone cream which she wants to ensure is ok for her to use when she has these flares.    O:  See OB Flowsheet    A/P: 37 yo  @ 18w2d by early US presents for EVELYN visit  1) Prenatal care: Rh positive, Tiffany negative, Rubella immune, Remainder NOB labs wnl  2) AMA/Genetic screening: Normal NIPT and Innatal microdeletion testing, AFP offered today and patient desires, Level II US scheduled in 2 days on 18, Having a BOY.  Did get baseline preeclampsia labs with slight elevation of ALT.  Plan to repeat today.  If still elevated consider RUQ US.    3) Borderline BP: Today we did discuss home BP Measurement and encouraged daily values and at different times of day.  Call with persistent BP > 140/90 and come in for evaluation with BP > 160/110.  Patient understands this recommendation  4) Screening for malignant neoplasm of cervix: History of LSIL/CHRIS-1 in .  Has NILM pap in  and NILM/HPV negative pap in 2015, not due until 2020.  5) Factor V Leiden heterozygote: Patient seen by Dr. Smart in the past.  Had negative APS testing.  Is on baby ASA currently.  No personal history of clot.  Plan for 6 weeks Lovenox 40 mg daily postpartum and patient agreeable with this plan, declines during prenatal care as not studies to show in her case beneficial.  6) History of kidney donation: Given to her father in past in  without altered kidney function.  Had normal baseline creatinine and Urine Pr:Cr.  7) Vitamin D deficiency: On supplementation, will check level again at EOB visit  8) Dishydrotic eczema: Uses mometasone medium potency cream with flares.  Discussed low likelihood of any effect with intermittent application.  9) s/p flu shot  10) Right thigh numbness: One 30 minute episode, completely resolved.  Did discuss likely self-limiting, could have been position/activity related.  Discussed with continued episodes or change to loss of motor function to notify us  immediately.  11) RTC in 4 weeks for EVELYN visit      Again, thank you for allowing me to participate in the care of your patient.      Sincerely,    Agnes Wheeler MD

## 2018-04-03 ENCOUNTER — MYC MEDICAL ADVICE (OUTPATIENT)
Dept: OBGYN | Facility: CLINIC | Age: 38
End: 2018-04-03

## 2018-04-03 DIAGNOSIS — R74.01 ELEVATED ALT MEASUREMENT: Primary | ICD-10-CM

## 2018-04-03 PROBLEM — L30.1 DYSHIDROTIC ECZEMA: Status: ACTIVE | Noted: 2018-04-03

## 2018-04-04 ENCOUNTER — OFFICE VISIT (OUTPATIENT)
Dept: MATERNAL FETAL MEDICINE | Facility: CLINIC | Age: 38
End: 2018-04-04
Attending: OBSTETRICS & GYNECOLOGY
Payer: COMMERCIAL

## 2018-04-04 ENCOUNTER — HOSPITAL ENCOUNTER (OUTPATIENT)
Dept: ULTRASOUND IMAGING | Facility: CLINIC | Age: 38
End: 2018-04-04
Attending: OBSTETRICS & GYNECOLOGY
Payer: COMMERCIAL

## 2018-04-04 ENCOUNTER — HOSPITAL ENCOUNTER (OUTPATIENT)
Dept: ULTRASOUND IMAGING | Facility: CLINIC | Age: 38
Discharge: HOME | End: 2018-04-04
Attending: OBSTETRICS & GYNECOLOGY | Admitting: OBSTETRICS & GYNECOLOGY
Payer: COMMERCIAL

## 2018-04-04 DIAGNOSIS — O26.20 RECURRENT PREGNANCY LOSS, CURRENTLY PREGNANT: ICD-10-CM

## 2018-04-04 DIAGNOSIS — O35.8XX0 UMBILICAL VEIN ABNORMALITY AFFECTING PREGNANCY, SINGLE OR UNSPECIFIED FETUS: ICD-10-CM

## 2018-04-04 DIAGNOSIS — D68.51 FACTOR V LEIDEN (H): ICD-10-CM

## 2018-04-04 DIAGNOSIS — O09.521 ELDERLY MULTIGRAVIDA IN FIRST TRIMESTER: ICD-10-CM

## 2018-04-04 DIAGNOSIS — R74.01 ELEVATED ALT MEASUREMENT: ICD-10-CM

## 2018-04-04 DIAGNOSIS — Z36.89 ENCOUNTER FOR FETAL ANATOMIC SURVEY: Primary | ICD-10-CM

## 2018-04-04 PROCEDURE — 76811 OB US DETAILED SNGL FETUS: CPT

## 2018-04-04 PROCEDURE — 76705 ECHO EXAM OF ABDOMEN: CPT

## 2018-04-04 NOTE — MR AVS SNAPSHOT
After Visit Summary   4/4/2018    Ruma Vega    MRN: [id number]           Patient Information     Date Of Birth          1980        Visit Information        Provider Department      4/4/2018 8:30 AM Wicho Good MD [facility] Maternal Fetal Medicine - [place]        Today's Diagnoses     Encounter for fetal anatomic survey    -  1    Umbilical vein abnormality affecting pregnancy, single or unspecified fetus           Follow-ups after your visit        Your next 10 appointments already scheduled     Apr 04, 2018 10:10 AM CDT   US ABDOMEN LIMITED with URUS3   [facility] [place] Ultrasound ([facility])    [address] [facility] [address] [phone]           Please bring a list of your medicines (including vitamins, minerals and over-the-counter drugs). Also, tell your doctor about any allergies you may have. Wear comfortable clothes and leave your valuables at home.  Adults: No eating or drinking for 8 hours before the exam. You may take medicine with a small sip of water.  Children: - Children 6+ years: No food or drink for 6 hours before exam. - Children 1-5 years: No food or drink for 4 hours before exam. - Infants, breast-fed: may have breast milk up to 2 hours before exam. - Infants, formula: may have bottle until 4 hours before exam.  Please call the Imaging Department at your exam site with any questions.            Apr 27, 2018 10:00 AM CDT   Ech Fetal Complete* with URFETR1   [facility] Echo/EKG ([facility])    [facility] [address]               Apr 27, 2018 11:00 AM CDT   (Arrive by 10:45 AM)   MFM US COMPRE SINGLE F/U with URMFMUSR3   [facility] Ultrasound - [place] ([facility])    [address] [facility] [address] [phone]           Wear comfortable clothes and leave your valuables  at home.            Apr 27, 2018 11:30 AM CDT   Radiology MD with UR PERLA BLAKE   [facility] [place] ([facility])    [address]  [facility] [address]   [phone]           Please arrive at the time given for your first appointment. This visit is used internally to schedule the physician's time during your ultrasound.            May 03, 2018 10:30 AM CDT   RETURN OB with Agnes Wheeler MD   [facility] ([facility])    [place] [facility]  [address]  [facility] [address] [phone]              Future tests that were ordered for you today     Open Future Orders        Priority Expected Expires Ordered    JOEY US Comprehensive Single F/U Routine  4/4/2019 4/4/2018    Echo Fetal Complete-Peds Cardiology Routine  4/4/2019 4/4/2018    US Abdomen Limited Routine  4/3/2019 4/3/2018            Who to contact     If you have questions or need follow up information about today's clinic visit or your schedule please contact [facility] [place] directly at [phone].  Normal or non-critical lab and imaging results will be communicated to you by MyChart, letter or phone within 4 business days after the clinic has received the results. If you do not hear from us within 7 days, please contact the clinic through [employer] or phone. If you have a critical or abnormal lab result, we will notify you by phone as soon as possible.  Submit refill requests through [employer] or call your pharmacy and they will forward the refill request to us. Please allow 3 business days for your refill to be completed.          Additional Information About Your Visit        [employer] Information     [employer] gives you secure access to your electronic health record. If you see a primary care provider, you can also send messages to your care team and make appointments. If you have questions, please  call your primary care clinic.  If you do not have a primary care provider, please call [phone] and they will assist you.        Care EveryWhere ID     This is your Care EveryWhere ID. This could be used by other organizations to access your [facility] medical records  [id number]         Blood Pressure from Last 3 Encounters:   04/02/18 135/89   02/15/18 126/83   02/07/17 (!) 154/95    Weight from Last 3 Encounters:   04/02/18 109.8 kg (242 lb)   02/15/18 105.9 kg (233 lb 6.4 oz)   01/29/18 104.1 kg (229 lb 8 oz)               Primary Care Provider Office Phone # Fax #    Shannon Jerri Holder [phone]       [facility] [address]  [facility] [address]        Equal Access to Services     [facility]: Hadii cindy wellington Sorobert, waaxda luqadaha, qaybta kaalmada adeseymuor, luba shoemaker . So [facility] [phone].    ATENCIÓN: Si habla español, tiene a figueroa disposición servicios gratuitos de asistencia lingüística. Llame al [phone].    We comply with applicable federal civil rights laws and [place] laws. We do not discriminate on the basis of race, color, national origin, age, disability, sex, sexual orientation, or gender identity.            Thank you!     Thank you for choosing [facility]  for your care. Our goal is always to provide you with excellent care. Hearing back from our patients is one way we can continue to improve our services. Please take a few minutes to complete the written survey that you may receive in the mail after your visit with us. Thank you!             Your Updated Medication List - Protect others around you: Learn how to safely use, store and throw away your medicines at www.disposemymeds.org.          This list is accurate as of 4/4/18  9:45 AM.  Always use your most recent med list.                   Brand Name Dispense Instructions for use Diagnosis    aspirin 81 MG tablet       Take by mouth daily        FLONASE 50 MCG/ACT spray   Generic drug:  fluticasone     1 Package    Spray 1-2 sprays into both nostrils daily    Allergic rhinitis       FOLIC ACID PO      Take 800 mcg by mouth daily        ketotifen 0.025 % Soln ophthalmic solution    ZADITOR/REFRESH ANTI-ITCH     Place 1 drop into both eyes 2 times daily        mometasone 0.1 % cream    ELOCON     Apply topically daily        PRENATAL VITAMINS PO      Take  by mouth daily.    Flu vaccine need       psyllium 58.6 % Powd    METAMUCIL     Take by mouth daily        SINUS RINSE BOTTLE KIT NA      Spray 1 packet in nostril as needed        VITAMIN D-3 PO

## 2018-04-04 NOTE — PROGRESS NOTES
"Please see \"Imaging\" tab under \"Chart Review\" for details of today's US at the [facility].    Wicho Good MD  Maternal-Fetal Medicine      "

## 2018-04-10 LAB — LAB SCANNED RESULT: NORMAL

## 2018-04-19 ENCOUNTER — TELEPHONE (OUTPATIENT)
Dept: OBGYN | Facility: CLINIC | Age: 38
End: 2018-04-19

## 2018-04-27 ENCOUNTER — OFFICE VISIT (OUTPATIENT)
Dept: MATERNAL FETAL MEDICINE | Facility: CLINIC | Age: 38
End: 2018-04-27
Attending: OBSTETRICS & GYNECOLOGY
Payer: COMMERCIAL

## 2018-04-27 ENCOUNTER — HOSPITAL ENCOUNTER (OUTPATIENT)
Dept: CARDIOLOGY | Facility: CLINIC | Age: 38
Discharge: HOME | End: 2018-04-27
Attending: OBSTETRICS & GYNECOLOGY | Admitting: OBSTETRICS & GYNECOLOGY
Payer: COMMERCIAL

## 2018-04-27 ENCOUNTER — HOSPITAL ENCOUNTER (OUTPATIENT)
Dept: ULTRASOUND IMAGING | Facility: CLINIC | Age: 38
End: 2018-04-27
Attending: OBSTETRICS & GYNECOLOGY
Payer: COMMERCIAL

## 2018-04-27 DIAGNOSIS — O35.8XX0 UMBILICAL VEIN ABNORMALITY AFFECTING PREGNANCY, SINGLE OR UNSPECIFIED FETUS: Primary | ICD-10-CM

## 2018-04-27 DIAGNOSIS — Z36.89 ENCOUNTER FOR FETAL ANATOMIC SURVEY: ICD-10-CM

## 2018-04-27 DIAGNOSIS — O35.8XX0 UMBILICAL VEIN ABNORMALITY AFFECTING PREGNANCY, SINGLE OR UNSPECIFIED FETUS: ICD-10-CM

## 2018-04-27 PROCEDURE — 76816 OB US FOLLOW-UP PER FETUS: CPT

## 2018-04-27 PROCEDURE — 76827 ECHO EXAM OF FETAL HEART: CPT

## 2018-04-27 NOTE — PROGRESS NOTES
"Please see \"Imaging\" tab under \"Chart Review\" for details of today's US at the [facility].    Wicho Good MD  Maternal-Fetal Medicine      "

## 2018-04-27 NOTE — CONSULTS
Fetal Cardiology Consultation    Patient:  Ruma Vega MRN:  [id number]   YOB: 1980 Age:  37 year old   Date of Visit:  4/27/2018 PCP:  Shannon Holder MD   CHRISTINA: 9/1/2018, by Other Basis EGA: 21w6d weeks     Dear Dr. Hobbs:    I had the pleasure of seeing Ruma Vega at the [facility] Fetal Echocardiography Laboratory in [place] on 4/27/2018 in consultation for fetal echocardiography results. She presented today accompanied by her partner. As you know, she is a 37 year old female with fetal extracardiac anomaly on obstetrical ultrasound (isolated umbilical vein).    The fetal echocardiogram was normal. Normal fetal cardiac anatomy. Normal right and left ventricular size and function without hypertrophy. No evidence of diastolic dysfunction. No pericardial effusion. No arrhythmia.     I reviewed and interpreted the fetal echocardiogram today. I discussed the normal results with Ms. Vega and her partner. While these results are normal, it is important to note that fetal echocardiography cannot exclude small atrial or ventricular septal defects, persistent ductus arteriosus, mild coarctation of the aorta, partial anomalous pulmonary venous return, minor anatomic valve anomalies, or coronary artery anomalies.     Thank you for allowing me to participate in Ms. Vega's care. Please don't hesitate to contact me or the Fetal Cardiology team at [facility] with any questions or concerns.     I spent a total of 10 minutes face-to-face with Ms. Vega during today's office visit. Over 50% of this time was spent counseling the patient and/or coordinating care regarding the fetal echocardiography results.     Brandyn Davidson  Pediatric Cardiology  [facility]  Phone [phone]

## 2018-04-27 NOTE — MR AVS SNAPSHOT
After Visit Summary   4/27/2018    Ruma Vega    MRN: [id number]           Patient Information     Date Of Birth          1980        Visit Information        Provider Department      4/27/2018 11:30 AM Wicho Good MD [facility] Maternal Fetal Medicine [facility]        Today's Diagnoses     Umbilical vein abnormality affecting pregnancy, single or unspecified fetus    -  1       Follow-ups after your visit        Your next 10 appointments already scheduled     May 03, 2018 10:30 AM CDT   RETURN OB with Agnes Wheeler MD   [facility] ([facility])    [place] [facility]  [address]  [facility] [address] [phone]              Who to contact     If you have questions or need follow up information about today's clinic visit or your schedule please contact [facility] directly at [phone].  Normal or non-critical lab and imaging results will be communicated to you by MyChart, letter or phone within 4 business days after the clinic has received the results. If you do not hear from us within 7 days, please contact the clinic through [employer] or phone. If you have a critical or abnormal lab result, we will notify you by phone as soon as possible.  Submit refill requests through [employer] or call your pharmacy and they will forward the refill request to us. Please allow 3 business days for your refill to be completed.          Additional Information About Your Visit        [employer] Information     [employer] gives you secure access to your electronic health record. If you see a primary care provider, you can also send messages to your care team and make appointments. If you have questions, please call your primary care clinic.  If you do not have a primary care provider, please call [phone] and they will assist you.        Care EveryWhere ID     This is your Care EveryWhere ID.  This could be used by other organizations to access your [place] medical records  [id number]         Blood Pressure from Last 3 Encounters:   04/02/18 135/89   02/15/18 126/83   02/07/17 (!) 154/95    Weight from Last 3 Encounters:   04/02/18 109.8 kg (242 lb)   02/15/18 105.9 kg (233 lb 6.4 oz)   01/29/18 104.1 kg (229 lb 8 oz)              Today, you had the following     No orders found for display       Primary Care Provider Office Phone # Fax #    Shannon Jerri Holder [phone]       [facility] [address]  [facility] [address]        Equal Access to Services     JOSÉ ANTONIO WESTFALL : Hadii cindy neffo Charly, waaxda luqadaha, qaybta kaalmada adeabigailda, luba shoemaker . So [facility] [phone].    ATENCIÓN: Si habla español, tiene a figueroa disposición servicios gratuitos de asistencia lingüística. [facility] [phone].    We comply with applicable federal civil rights laws and [place] laws. We do not discriminate on the basis of race, color, national origin, age, disability, sex, sexual orientation, or gender identity.            Thank you!     Thank you for choosing [facility]  for your care. Our goal is always to provide you with excellent care. Hearing back from our patients is one way we can continue to improve our services. Please take a few minutes to complete the written survey that you may receive in the mail after your visit with us. Thank you!             Your Updated Medication List - Protect others around you: Learn how to safely use, store and throw away your medicines at www.disposemymeds.org.          This list is accurate as of 4/27/18 12:08 PM.  Always use your most recent med list.                   Brand Name Dispense Instructions for use Diagnosis    aspirin 81 MG tablet      Take by mouth daily        FLONASE 50 MCG/ACT spray   Generic drug:  fluticasone     1 Package    Spray 1-2 sprays into both  nostrils daily    Allergic rhinitis       FOLIC ACID PO      Take 800 mcg by mouth daily        ketotifen 0.025 % Soln ophthalmic solution    ZADITOR/REFRESH ANTI-ITCH     Place 1 drop into both eyes 2 times daily        mometasone 0.1 % cream    ELOCON     Apply topically daily        PRENATAL VITAMINS PO      Take  by mouth daily.    Flu vaccine need       psyllium 58.6 % Powd    METAMUCIL     Take by mouth daily        SINUS RINSE BOTTLE KIT NA      Spray 1 packet in nostril as needed        VITAMIN D-3 PO

## 2018-05-03 ENCOUNTER — OFFICE VISIT (OUTPATIENT)
Dept: OBGYN | Facility: CLINIC | Age: 38
End: 2018-05-03
Attending: OBSTETRICS & GYNECOLOGY
Payer: COMMERCIAL

## 2018-05-03 VITALS
BODY MASS INDEX: 37.06 KG/M2 | WEIGHT: 250.2 LBS | SYSTOLIC BLOOD PRESSURE: 124 MMHG | DIASTOLIC BLOOD PRESSURE: 84 MMHG | HEIGHT: 69 IN | HEART RATE: 85 BPM

## 2018-05-03 DIAGNOSIS — D68.51 FACTOR V LEIDEN (H): ICD-10-CM

## 2018-05-03 DIAGNOSIS — O09.90 HIGH RISK PREGNANCY, ANTEPARTUM: Primary | ICD-10-CM

## 2018-05-03 DIAGNOSIS — Z52.4 KIDNEY DONOR: ICD-10-CM

## 2018-05-03 LAB
ALT SERPL W P-5'-P-CCNC: 31 U/L (ref 0–50)
AST SERPL W P-5'-P-CCNC: 22 U/L (ref 0–45)
CREAT SERPL-MCNC: 0.9 MG/DL (ref 0.52–1.04)
CREAT UR-MCNC: 38 MG/DL
ERYTHROCYTE [DISTWIDTH] IN BLOOD BY AUTOMATED COUNT: 13.4 % (ref 10–15)
GFR SERPL CREATININE-BSD FRML MDRD: 70 ML/MIN/1.7M2
HCT VFR BLD AUTO: 34 % (ref 35–47)
HGB BLD-MCNC: 11.6 G/DL (ref 11.7–15.7)
MCH RBC QN AUTO: 30.8 PG (ref 26.5–33)
MCHC RBC AUTO-ENTMCNC: 34.1 G/DL (ref 31.5–36.5)
MCV RBC AUTO: 90 FL (ref 78–100)
PLATELET # BLD AUTO: 342 10E9/L (ref 150–450)
PROT UR-MCNC: 0.05 G/L
PROT/CREAT 24H UR: 0.13 G/G CR (ref 0–0.2)
RBC # BLD AUTO: 3.77 10E12/L (ref 3.8–5.2)
WBC # BLD AUTO: 12.7 10E9/L (ref 4–11)

## 2018-05-03 PROCEDURE — 82565 ASSAY OF CREATININE: CPT | Performed by: OBSTETRICS & GYNECOLOGY

## 2018-05-03 PROCEDURE — 84460 ALANINE AMINO (ALT) (SGPT): CPT | Performed by: OBSTETRICS & GYNECOLOGY

## 2018-05-03 PROCEDURE — 85027 COMPLETE CBC AUTOMATED: CPT | Performed by: OBSTETRICS & GYNECOLOGY

## 2018-05-03 PROCEDURE — 84156 ASSAY OF PROTEIN URINE: CPT | Performed by: OBSTETRICS & GYNECOLOGY

## 2018-05-03 PROCEDURE — 36415 COLL VENOUS BLD VENIPUNCTURE: CPT | Performed by: OBSTETRICS & GYNECOLOGY

## 2018-05-03 PROCEDURE — 84450 TRANSFERASE (AST) (SGOT): CPT | Performed by: OBSTETRICS & GYNECOLOGY

## 2018-05-03 PROCEDURE — G0463 HOSPITAL OUTPT CLINIC VISIT: HCPCS | Mod: ZF

## 2018-05-03 NOTE — LETTER
5/3/2018       RE: Ruma Vega  [address]  [place] [address]     Dear Colleague,    Thank you for referring your patient, Ruma Vega, to the [facility] at [facility]. Please see a copy of my visit note below.    EVELYN Visit 5/3/18    S: Doing well in general aside from some weird aches and MSK issues.  Continues to have episodes of numbness on her right thigh with activity but resolves with rest every time.  She has also noticed some achiness in her bilateral hands at night time that resolves as soon as she is up.  She has been having some difficulty with sleep and is is mostly waking up in the middle of the night and not being able to get back to sleep.  Also feels hard to get a comfortable position.  Occasional pelvic pain but nothing long lasting or regular in nature.  No VB or LOF.  + FM, very active.  Denies HA, scotoma, SOB, RUQ pain or increased swelling in her extremities.    O:  See OB Flowsheet    A/P: 37 yo  @ 22w5d by early US presents for EVELYN visit  1) Prenatal care: Rh positive, Tiffany negative, Rubella immune, Remainder NOB labs wnl  2) AMA/Genetic screening: Normal NIPT and Innatal microdeletion testing, AFP normal, Level II US with suboptimal view of heart/profilee, Having a BOY.  Repeat US within normal although persistent right umbilical vein.  Due to this a fetal echo was completed and was normal.   3) Borderline BP: Checking BP at home.  Call with persistent BP > 140/90 and come in for evaluation with BP > 160/110.  Patient understands this recommendation.  Did get baseline preeclampsia labs with slight elevation of ALT.   Was persistently elevated and RUQ US completed without evidence of PADILLA.  Plan per [facility] repeat serial labs every visit.  Repeat ordered today.   4) Screening for malignant neoplasm of cervix: History of LSIL/CHRIS-1 in .  Has NILM pap in  and NILM/HPV negative pap in 2015, not due  until 7/2020.  5) Factor V Leiden heterozygote: Patient seen by Dr. Smart in the past.  Had negative APS testing.  Is on baby ASA currently.  No personal history of clot.  Plan for 6 weeks Lovenox 40 mg daily postpartum and patient agreeable with this plan, declines during prenatal care as not studies to show in her case beneficial.  6) History of kidney donation: Given to her father in past in 2010 without altered kidney function.  Had normal baseline creatinine and Urine Pr:Cr.  Will repeat with HELLP labs.  7) Vitamin D deficiency: On supplementation, will check level again at EOB visit  8) Dishydrotic eczema: Uses mometasone medium potency cream with flares.  Discussed low likelihood of any effect with intermittent application.  9) s/p flu shot  10) Right thigh numbness: Occasional episodes with activity, resolves with rest and no issues with weight baring, all sensory in nature.  Continue to monitor and call with monitor dysfunction or spreading of paresthesia area.  11) Bilateral hand discomfort: Did discuss potential for carpal tunnel, not uncommon in pregnancy.  Continue to monitor and braces if worsens.  12) Sleep disturbances: Discussed sleep hygiene and getting body pillow to assist with positioning.  Will try this, declined any medication intervention at this time.  13) RTC in 4 weeks for EOB visit      Again, thank you for allowing me to participate in the care of your patient.      Sincerely,    Agnes Wheeler MD

## 2018-05-03 NOTE — PROGRESS NOTES
EVELYN Visit 5/3/18    S: Doing well in general aside from some weird aches and MSK issues.  Continues to have episodes of numbness on her right thigh with activity but resolves with rest every time.  She has also noticed some achiness in her bilateral hands at night time that resolves as soon as she is up.  She has been having some difficulty with sleep and is is mostly waking up in the middle of the night and not being able to get back to sleep.  Also feels hard to get a comfortable position.  Occasional pelvic pain but nothing long lasting or regular in nature.  No VB or LOF.  + FM, very active.  Denies HA, scotoma, SOB, RUQ pain or increased swelling in her extremities.    O:  See OB Flowsheet    A/P: 37 yo  @ 22w5d by early US presents for EVELYN visit  1) Prenatal care: Rh positive, Tiffany negative, Rubella immune, Remainder NOB labs wnl  2) AMA/Genetic screening: Normal NIPT and Innatal microdeletion testing, AFP normal, Level II US with suboptimal view of heart/profilee, Having a BOY.  Repeat US within normal although persistent right umbilical vein.  Due to this a fetal echo was completed and was normal.   3) Borderline BP: Checking BP at home.  Call with persistent BP > 140/90 and come in for evaluation with BP > 160/110.  Patient understands this recommendation.  Did get baseline preeclampsia labs with slight elevation of ALT.  Was persistently elevated and RUQ US completed without evidence of PADILLA.  Plan per [facility] repeat serial labs every visit.  Repeat ordered today.   4) Screening for malignant neoplasm of cervix: History of LSIL/CHRIS-1 in .  Has NILM pap in  and NILM/HPV negative pap in 2015, not due until 2020.  5) Factor V Leiden heterozygote: Patient seen by Dr. Smart in the past.  Had negative APS testing.  Is on baby ASA currently.  No personal history of clot.  Plan for 6 weeks Lovenox 40 mg daily postpartum and patient agreeable with this plan, declines during prenatal care as not  studies to show in her case beneficial.  6) History of kidney donation: Given to her father in past in 2010 without altered kidney function.  Had normal baseline creatinine and Urine Pr:Cr.  Will repeat with HELLP labs.  7) Vitamin D deficiency: On supplementation, will check level again at EOB visit  8) Dishydrotic eczema: Uses mometasone medium potency cream with flares.  Discussed low likelihood of any effect with intermittent application.  9) s/p flu shot  10) Right thigh numbness: Occasional episodes with activity, resolves with rest and no issues with weight baring, all sensory in nature.  Continue to monitor and call with monitor dysfunction or spreading of paresthesia area.  11) Bilateral hand discomfort: Did discuss potential for carpal tunnel, not uncommon in pregnancy.  Continue to monitor and braces if worsens.  12) Sleep disturbances: Discussed sleep hygiene and getting body pillow to assist with positioning.  Will try this, declined any medication intervention at this time.  13) RTC in 4 weeks for EOB visit    Agnes Wheeler MD

## 2018-05-03 NOTE — MR AVS SNAPSHOT
After Visit Summary   5/3/2018    Ruma Vega    MRN: [id number]           Patient Information     Date Of Birth          1980        Visit Information        Provider Department      5/3/2018 10:30 AM Agnes Wheeler MD [facility]        Today's Diagnoses     High risk pregnancy, antepartum    -  1    Factor V Leiden (H)        Kidney donor           Follow-ups after your visit        Follow-up notes from your care team     Return in about 4 weeks (around 5/31/2018) for EOB Visit with Liam.      Your next 10 appointments already scheduled     Jun 07, 2018  8:30 AM CDT   RETURN OB with Agnes Wheeler MD   [facility] ([facility])    [facility] [address]  [address]  [facility] [address] [phone]              Who to contact     Please call your clinic at [phone] to:    Ask questions about your health    Make or cancel appointments    Discuss your medicines    Learn about your test results    Speak to your doctor            Additional Information About Your Visit        [employer] Information     [employer] gives you secure access to your electronic health record. If you see a primary care provider, you can also send messages to your care team and make appointments. If you have questions, please call your primary care clinic.  If you do not have a primary care provider, please call [phone] and they will assist you.      [employer] is an electronic gateway that provides easy, online access to your medical records. With [employer], you can request a clinic appointment, read your test results, renew a prescription or communicate with your care team.     To access your existing account, please contact your [facility] or call [phone] for assistance.        Care EveryWhere ID     This is your Care EveryWhere ID. This could be used by other organizations to access  "your [place] medical records  [id number]        Your Vitals Were     Pulse Height BMI (Body Mass Index)             85 1.753 m (5' 9\") 36.95 kg/m2          Blood Pressure from Last 3 Encounters:   05/03/18 124/84   04/02/18 135/89   02/15/18 126/83    Weight from Last 3 Encounters:   05/03/18 113.5 kg (250 lb 3.2 oz)   04/02/18 109.8 kg (242 lb)   02/15/18 105.9 kg (233 lb 6.4 oz)              We Performed the Following     ALT     AST     CBC with Platelets     Creatinine urine calculation only     Creatinine     Protein  random urine with Creat Ratio        Primary Care Provider Office Phone # Fax #    Shannon Jerri Holder [phone]       [facility] [address]  [facility] [address]        Equal Access to Services     [facility]: Hadii cindy wellington Sorobert, waaxda donna, qaybta kaalmada carlos, luba shoemaker . So [facility] [phone].    ATENCIÓN: Si habla español, tiene a figueroa disposición servicios gratuitos de asistencia lingüística. [place] al [phone].    We comply with applicable federal civil rights laws and [place] laws. We do not discriminate on the basis of race, color, national origin, age, disability, sex, sexual orientation, or gender identity.            Thank you!     Thank you for choosing [facility]  for your care. Our goal is always to provide you with excellent care. Hearing back from our patients is one way we can continue to improve our services. Please take a few minutes to complete the written survey that you may receive in the mail after your visit with us. Thank you!             Your Updated Medication List - Protect others around you: Learn how to safely use, store and throw away your medicines at www.disposemymeds.org.          This list is accurate as of 5/3/18  4:28 PM.  Always use your most recent med list.                   Brand Name Dispense Instructions for use Diagnosis    aspirin " 81 MG tablet      Take by mouth daily        FLONASE 50 MCG/ACT spray   Generic drug:  fluticasone     1 Package    Spray 1-2 sprays into both nostrils daily    Allergic rhinitis       FOLIC ACID PO      Take 800 mcg by mouth daily        ketotifen 0.025 % Soln ophthalmic solution    ZADITOR/REFRESH ANTI-ITCH     Place 1 drop into both eyes 2 times daily        mometasone 0.1 % cream    ELOCON     Apply topically daily        PRENATAL VITAMINS PO      Take  by mouth daily.    Flu vaccine need       psyllium 58.6 % Powd    METAMUCIL     Take by mouth daily        SINUS RINSE BOTTLE KIT NA      Spray 1 packet in nostril as needed        VITAMIN D-3 PO

## 2018-06-07 ENCOUNTER — OFFICE VISIT (OUTPATIENT)
Dept: OBGYN | Facility: CLINIC | Age: 38
End: 2018-06-07
Attending: OBSTETRICS & GYNECOLOGY
Payer: COMMERCIAL

## 2018-06-07 VITALS
DIASTOLIC BLOOD PRESSURE: 83 MMHG | BODY MASS INDEX: 37.72 KG/M2 | HEART RATE: 106 BPM | HEIGHT: 69 IN | WEIGHT: 254.7 LBS | SYSTOLIC BLOOD PRESSURE: 120 MMHG

## 2018-06-07 DIAGNOSIS — D68.51 FACTOR V LEIDEN (H): ICD-10-CM

## 2018-06-07 DIAGNOSIS — O09.90 HIGH RISK PREGNANCY, ANTEPARTUM: Primary | ICD-10-CM

## 2018-06-07 DIAGNOSIS — O26.842 UTERINE SIZE-DATE DISCREPANCY IN SECOND TRIMESTER: ICD-10-CM

## 2018-06-07 DIAGNOSIS — Z52.4 KIDNEY DONOR: ICD-10-CM

## 2018-06-07 DIAGNOSIS — O09.522 ELDERLY MULTIGRAVIDA IN SECOND TRIMESTER: ICD-10-CM

## 2018-06-07 DIAGNOSIS — Z23 NEED FOR TDAP VACCINATION: ICD-10-CM

## 2018-06-07 LAB
ALT SERPL W P-5'-P-CCNC: 24 U/L (ref 0–50)
AST SERPL W P-5'-P-CCNC: 19 U/L (ref 0–45)
CREAT SERPL-MCNC: 0.75 MG/DL (ref 0.52–1.04)
CREAT UR-MCNC: 127 MG/DL
DEPRECATED CALCIDIOL+CALCIFEROL SERPL-MC: 44 UG/L (ref 20–75)
ERYTHROCYTE [DISTWIDTH] IN BLOOD BY AUTOMATED COUNT: 13.4 % (ref 10–15)
GFR SERPL CREATININE-BSD FRML MDRD: 86 ML/MIN/1.7M2
GLUCOSE 1H P 50 G GLC PO SERPL-MCNC: 98 MG/DL (ref 60–129)
HCT VFR BLD AUTO: 33.6 % (ref 35–47)
HGB BLD-MCNC: 11.5 G/DL (ref 11.7–15.7)
MCH RBC QN AUTO: 31 PG (ref 26.5–33)
MCHC RBC AUTO-ENTMCNC: 34.2 G/DL (ref 31.5–36.5)
MCV RBC AUTO: 91 FL (ref 78–100)
PLATELET # BLD AUTO: 316 10E9/L (ref 150–450)
PROT UR-MCNC: 0.24 G/L
PROT/CREAT 24H UR: 0.19 G/G CR (ref 0–0.2)
RBC # BLD AUTO: 3.71 10E12/L (ref 3.8–5.2)
T PALLIDUM AB SER QL: NONREACTIVE
WBC # BLD AUTO: 13.7 10E9/L (ref 4–11)

## 2018-06-07 PROCEDURE — 36415 COLL VENOUS BLD VENIPUNCTURE: CPT | Performed by: OBSTETRICS & GYNECOLOGY

## 2018-06-07 PROCEDURE — 84450 TRANSFERASE (AST) (SGOT): CPT | Performed by: OBSTETRICS & GYNECOLOGY

## 2018-06-07 PROCEDURE — 82306 VITAMIN D 25 HYDROXY: CPT | Performed by: OBSTETRICS & GYNECOLOGY

## 2018-06-07 PROCEDURE — 82950 GLUCOSE TEST: CPT | Performed by: OBSTETRICS & GYNECOLOGY

## 2018-06-07 PROCEDURE — 86780 TREPONEMA PALLIDUM: CPT | Performed by: OBSTETRICS & GYNECOLOGY

## 2018-06-07 PROCEDURE — 84156 ASSAY OF PROTEIN URINE: CPT | Performed by: OBSTETRICS & GYNECOLOGY

## 2018-06-07 PROCEDURE — 85027 COMPLETE CBC AUTOMATED: CPT | Performed by: OBSTETRICS & GYNECOLOGY

## 2018-06-07 PROCEDURE — 84460 ALANINE AMINO (ALT) (SGPT): CPT | Performed by: OBSTETRICS & GYNECOLOGY

## 2018-06-07 PROCEDURE — 82565 ASSAY OF CREATININE: CPT | Performed by: OBSTETRICS & GYNECOLOGY

## 2018-06-07 NOTE — PROGRESS NOTES
EOB Visit 18    S: Doing ok.  Has had some days where she has just not felt well, but thinks possibly related to her getting overheated in her office as she has noticed when working remotely at home she does not have these symptoms.  She states her hand discomfort has actually been improved the last 2 weeks.  She continues to have the numbness in her right thigh, but unchanged and continues to be active with walks most days.  She had 1 BH ctx with activity one day but nothing else.  No VB or LOF.  Has noticed more discharge and wearing panty liner now for comfort.  + FM, very active.  Denies HA, scotoma, SOB, RUQ pain or increased swelling in her extremities.    Education completed today includes breast feeding, [facility] hand out , contraception, counting movements, signs of pre-term labor, when to present to birthplace, post partum depression, GBS, getting enough iron and labor induction.  Birth preferences reviewed: Medicated  Labor support:    [place] Feeding plans :   Contraception planned:  Mirena vs condoms depending on plans for next child and type of delivery  The following labs were ordered today:       GCT, CBC w platelets, Vitamin D and Anti-treponema  Water birth consent form was not given.  Blood type:   ABO   Date Value Ref Range Status   2018 A  Final     RH(D)   Date Value Ref Range Status   2018 Pos  Final     Antibody Screen   Date Value Ref Range Status   2018 Neg  Final   Rhogam  was not given.  TDAP was given.    O:  See OB Flowsheet    A/P: 39 yo  @ 27w5d by early US presents for EVELYN visit  1) Prenatal care: Rh positive, Tiffany negative, Rubella immune, Remainder NOB labs wnl.  EOB labs ordered today.  2) AMA/Genetic screening: Normal NIPT and Innatal microdeletion testing, AFP normal, Level II US with suboptimal view of heart/profilee, Having a BOY.  Repeat US within normal although persistent right umbilical vein.  Due to this a fetal echo was completed  and was normal.   3) Borderline BP: Checking BP at home.  Call with persistent BP > 140/90 and come in for evaluation with BP > 160/110.  Patient understands this recommendation.  Did get baseline preeclampsia labs with slight elevation of ALT.  Was persistently elevated and RUQ US completed without evidence of PADILLA.  Plan per [facility] repeat serial labs every visit.  Repeat ordered today.   4) Screening for malignant neoplasm of cervix: History of LSIL/CHRIS-1 in 2010.  Has NILM pap in 2011 and NILM/HPV negative pap in 7/2015, not due until 7/2020.  5) Factor V Leiden heterozygote: Patient seen by Dr. Smart in the past.  Had negative APS testing.  Is on baby ASA currently.  No personal history of clot.  Plan for 6 weeks Lovenox 40 mg daily postpartum and patient agreeable with this plan, declines during prenatal care as no studies to show in her case beneficial.  6) History of kidney donation: Given to her father in past in 2010 without altered kidney function.  Had normal baseline creatinine and Urine Pr:Cr.  Will repeat with HELLP labs today.  7) Vitamin D deficiency: On supplementation, will check level again today  8) Dishydrotic eczema: Uses mometasone medium potency cream with flares.  Discussed low likelihood of any effect with intermittent application.  9) s/p flu shot, Tdap next visit  10) Right thigh numbness: Occasional episodes with activity, resolves with rest and no issues with weight baring, all sensory in nature.  Continue to monitor and call with monitor dysfunction or spreading of paresthesia area.  11) Bilateral hand discomfort: Improved  12) Sleep disturbances: Improved  13) S>D: Growth scan today, GCT today  14) Labor: Epidural/Breastfeed/Mirena vs Condoms  15) RTC in 2 weeks for EVELYN visit

## 2018-06-07 NOTE — MR AVS SNAPSHOT
After Visit Summary   6/7/2018    Ruma Vega    MRN: [id number]           Patient Information     Date Of Birth          1980        Visit Information        Provider Department      6/7/2018 8:30 AM Agnes Wheeler MD [facility]        Today's Diagnoses     High risk pregnancy, antepartum    -  1    Elderly multigravida in second trimester        Factor V Leiden (H)        Kidney donor        Need for Tdap vaccination        Uterine size-date discrepancy in second trimester           Follow-ups after your visit        Follow-up notes from your care team     Return in about 2 weeks (around 6/21/2018) for EVELYN Visit.      Your next 10 appointments already scheduled     Jun 12, 2018  1:00 PM CDT   ULTRASOUND with [facility] ULTRASOUND   [facility] ([facility])    [facility] [address]  [address]  [address]  [facility] [id number] [phone]            Jun 25, 2018  8:45 AM CDT   RETURN OB with Agnes Wheeler MD   [facility] ([facility])    [facility] [address]  [address]  [address]  [facility] [id number] [phone]              Future tests that were ordered for you today     Open Future Orders        Priority Expected Expires Ordered    Growth Ultrasound [address] Routine  10/5/2018 6/7/2018            Who to contact     Please call your clinic at [phone] to:    Ask questions about your health    Make or cancel appointments    Discuss your medicines    Learn about your test results    Speak to your doctor            Additional Information About Your Visit        MyChart Information     "[employer]t gives you secure access to your electronic health record. If you see a primary care provider, you can also send messages to your care team and make appointments. If you have questions, please call your primary care clinic.  If you do not have a primary care  "provider, please call [phone] and they will assist you.      [employer] is an electronic gateway that provides easy, online access to your medical records. With [employer], you can request a clinic appointment, read your test results, renew a prescription or communicate with your care team.     To access your existing account, please contact your [facility] or call [phone] for assistance.        Care EveryWhere ID     This is your Care EveryWhere ID. This could be used by other organizations to access your [place] medical records  [id number]        Your Vitals Were     Pulse Height BMI (Body Mass Index)             106 1.753 m (5' 9\") 37.61 kg/m2          Blood Pressure from Last 3 Encounters:   06/07/18 120/83   05/03/18 124/84   04/02/18 135/89    Weight from Last 3 Encounters:   06/07/18 115.5 kg (254 lb 11.2 oz)   05/03/18 113.5 kg (250 lb 3.2 oz)   04/02/18 109.8 kg (242 lb)              We Performed the Following     25- [place]-Vitamin D     ALT     AST     CBC with Platelets     Creatinine urine calculation only     Creatinine     Glucose 1 Hour     Protein  random urine with Creat Ratio     Treponema Abs w Reflex to RPR and Titer        Primary Care Provider Office Phone # Fax #    Shannon Jerri Holder [phone]       [facility] [address] [facility] [address]        Equal Access to Services     [facility]: Hadii aad ku hadasho Sogabyali, waaxda luqadaha, qaybta kaalmada adeabigailda, luba shoemaker . So [facility] [phone].    ATENCIÓN: Si habla español, tiene a figueroa disposición servicios gratuitos de asistencia lingüística. Llame al [phone].    We comply with applicable federal civil rights laws and [place] laws. We do not discriminate on the basis of race, color, national origin, age, disability, sex, sexual orientation, or gender identity.            Thank you!     Thank you for choosing [facility]  for your care. Our goal is always to provide you with excellent care. Hearing back from our patients is one way we can continue to improve our services. Please take a few minutes to complete the written survey that you may receive in the mail after your visit with us. Thank you!             Your Updated Medication List - Protect others around you: Learn how to safely use, store and throw away your medicines at www.disposemymeds.org.          This list is accurate as of 6/7/18 12:08 PM.  Always use your most recent med list.                   Brand Name Dispense Instructions for use Diagnosis    aspirin 81 MG tablet      Take by mouth daily        FLONASE 50 MCG/ACT spray   Generic drug:  fluticasone     1 Package    Spray 1-2 sprays into both nostrils daily    Allergic rhinitis       FOLIC ACID PO      Take 800 mcg by mouth daily        ketotifen 0.025 % Soln ophthalmic solution    ZADITOR/REFRESH ANTI-ITCH     Place 1 drop into both eyes 2 times daily        mometasone 0.1 % cream    ELOCON     Apply topically daily        PRENATAL VITAMINS PO      Take  by mouth daily.    Flu vaccine need       psyllium 58.6 % Powd    METAMUCIL     Take by mouth daily        SINUS RINSE BOTTLE KIT NA      Spray 1 packet in nostril as needed        VITAMIN D-3 PO

## 2018-06-07 NOTE — LETTER
2018       RE: Ruma Vega  [address] [facility] [place] [address]     Dear Colleague,    Thank you for referring your patient, Ruma Vega, to the [facility] at [facility]. Please see a copy of my visit note below.    EOB Visit 18    S: Doing ok.  Has had some days where she has just not felt well, but thinks possibly related to her getting overheated in her office as she has noticed when working remotely at home she does not have these symptoms.  She states her hand discomfort has actually been improved the last 2 weeks.  She continues to have the numbness in her right thigh, but unchanged and continues to be active with walks most days.  She had 1 BH ctx with activity one day but nothing else.  No VB or LOF.  Has noticed more discharge and wearing panty liner now for comfort.  + FM, very active.  Denies HA, scotoma, SOB, RUQ pain or increased swelling in her extremities.    Education completed today includes breast feeding, [facility] hand out , contraception, counting movements, signs of pre-term labor, when to present to birthplace, post partum depression, GBS, getting enough iron and labor induction.  Birth preferences reviewed: Medicated  Labor support:    [place] Feeding plans :   Contraception planned:  Mirena vs condoms depending on plans for next child and type of delivery  The following labs were ordered today:       GCT, CBC w platelets, Vitamin D and Anti-treponema  Water birth consent form was not given.  Blood type:   ABO   Date Value Ref Range Status   2018 A  Final     RH(D)   Date Value Ref Range Status   2018 Pos  Final     Antibody Screen   Date Value Ref Range Status   2018 Neg  Final   Rhogam  was not given.  TDAP was given.    O:  See OB Flowsheet    A/P: 39 yo  @ 27w5d by early US presents for EVELYN visit  1) Prenatal care: Rh positive, Tiffany negative, Rubella immune, Remainder  NOB labs wnl.  EOB labs ordered today.  2) AMA/Genetic screening: Normal NIPT and Innatal microdeletion testing, AFP normal, Level II US with suboptimal view of heart/profilee, Having a BOY.  Repeat US within normal although persistent right umbilical vein.  Due to this a fetal echo was completed and was normal.   3) Borderline BP: Checking BP at home.  Call with persistent BP > 140/90 and come in for evaluation with BP > 160/110.  Patient understands this recommendation.  Did get baseline preeclampsia labs with slight elevation of ALT.  Was persistently elevated and RUQ US completed without evidence of PADILLA.  Plan per [facility] repeat serial labs every visit.  Repeat ordered today.   4) Screening for malignant neoplasm of cervix: History of LSIL/CHRIS-1 in 2010.  Has NILM pap in 2011 and NILM/HPV negative pap in 7/2015, not due until 7/2020.  5) Factor V Leiden heterozygote: Patient seen by Dr. Smart in the past.  Had negative APS testing.  Is on baby ASA currently.  No personal history of clot.  Plan for 6 weeks Lovenox 40 mg daily postpartum and patient agreeable with this plan, declines during prenatal care as no studies to show in her case beneficial.  6) History of kidney donation: Given to her father in past in 2010 without altered kidney function.  Had normal baseline creatinine and Urine Pr:Cr.  Will repeat with HELLP labs today.  7) Vitamin D deficiency: On supplementation, will check level again today  8) Dishydrotic eczema: Uses mometasone medium potency cream with flares.  Discussed low likelihood of any effect with intermittent application.  9) s/p flu shot, Tdap next visit  10) Right thigh numbness: Occasional episodes with activity, resolves with rest and no issues with weight baring, all sensory in nature.  Continue to monitor and call with monitor dysfunction or spreading of paresthesia area.  11) Bilateral hand discomfort: Improved  12) Sleep disturbances: Improved  13) S>D: Growth scan today, GCT  today  14) Labor: Epidural/Breastfeed/Mirena vs Condoms  15) RTC in 2 weeks for EVELYN visit

## 2018-06-12 ENCOUNTER — OFFICE VISIT (OUTPATIENT)
Dept: OBGYN | Facility: CLINIC | Age: 38
End: 2018-06-12
Attending: OBSTETRICS & GYNECOLOGY
Payer: COMMERCIAL

## 2018-06-12 DIAGNOSIS — O26.842 UTERINE SIZE-DATE DISCREPANCY IN SECOND TRIMESTER: ICD-10-CM

## 2018-06-12 PROCEDURE — 76816 OB US FOLLOW-UP PER FETUS: CPT | Mod: ZF

## 2018-06-12 NOTE — PROGRESS NOTES
37 year old female, , presents at 28 3/7 weeks for obstetric ultrasound assessment indicated by size greater than dates.    Single fetus    Presentation cephalic    USEGA = 31 0/7 weeks.  EFW = 1669 grams, 3 lbs 11 oz, 99 % for 28 weeks. HC= 95%, AC= 99%      RAYMUNDO = 17.9cm.  FHR = 152bpm     Placenta posterior and grade 1    Comments: LGA pattern of growth in setting of normal GCT    Findings discussed with patient.    Further studies repeat growth ultrasound in 3 weeks.        IKER Preciado MD

## 2018-06-12 NOTE — MR AVS SNAPSHOT
After Visit Summary   6/12/2018    Ruma Vega    MRN: [id number]           Patient Information     Date Of Birth          1980        Visit Information        Provider Department      6/12/2018 1:00 PM [facility] ULTRASOUND [facility]        Today's Diagnoses     Uterine size-date discrepancy in second trimester           Follow-ups after your visit        Your next 10 appointments already scheduled     Jun 25, 2018  8:45 AM CDT   RETURN OB with Agnes Wheeler MD   [facility] ([facility])    [facility] [address]  [address]  [address]  [facility] [id number] [phone]              Who to contact     Please call your clinic at [phone] to:    Ask questions about your health    Make or cancel appointments    Discuss your medicines    Learn about your test results    Speak to your doctor            Additional Information About Your Visit        MyChart Information     [employer] gives you secure access to your electronic health record. If you see a primary care provider, you can also send messages to your care team and make appointments. If you have questions, please call your primary care clinic.  If you do not have a primary care provider, please call [phone] and they will assist you.      [employer] is an electronic gateway that provides easy, online access to your medical records. With [employer], you can request a clinic appointment, read your test results, renew a prescription or communicate with your care team.     To access your existing account, please contact your [facility] or call [phone] for assistance.        Care EveryWhere ID     This is your Care EveryWhere ID. This could be used by other organizations to access your [place] medical records  [id number]         Blood Pressure from Last 3 Encounters:   06/07/18 120/83   05/03/18 124/84   04/02/18 135/89     Weight from Last 3 Encounters:   06/07/18 115.5 kg (254 lb 11.2 oz)   05/03/18 113.5 kg (250 lb 3.2 oz)   04/02/18 109.8 kg (242 lb)              We Performed the Following     Growth Ultrasound [id number]        Primary Care Provider Office Phone # Fax #    Shannon Jerri Holder [phone]       [facility] [address]  [facility] [address]        Equal Access to Services     JOSÉ ANTONIO WESTFALL : Hadii aad ku hadasho Soomaali, waaxda luqadaha, qaybta kaalmada adeegyada, waxay idiin hayaan adeeg john garcia. So [facility] [phone].    ATENCIÓN: Si habla español, tiene a figueroa disposición servicios gratuitos de asistencia lingüística. [facility] [phone].    We comply with applicable federal civil rights laws and [place] laws. We do not discriminate on the basis of race, color, national origin, age, disability, sex, sexual orientation, or gender identity.            Thank you!     Thank you for [facility]  [facility] your care. Our goal is always to provide you with excellent care. Hearing back from our patients is one way we can continue to improve our services. Please take a few minutes to complete the written survey that you may receive in the mail after your visit with us. Thank you!             Your Updated Medication List - Protect others around you: Learn how to safely use, store and throw away your medicines at www.disposemymeds.org.          This list is accurate as of 6/12/18  2:28 PM.  Always use your most recent med list.                   Brand Name Dispense Instructions for use Diagnosis    aspirin 81 MG tablet      Take by mouth daily        FLONASE 50 MCG/ACT spray   Generic drug:  fluticasone     1 Package    Spray 1-2 sprays into both nostrils daily    Allergic rhinitis       FOLIC ACID PO      Take 800 mcg by mouth daily        ketotifen 0.025 % Soln ophthalmic solution    ZADITOR/REFRESH ANTI-ITCH     Place 1 drop into both eyes 2 times daily         mometasone 0.1 % cream    ELOCON     Apply topically daily        PRENATAL VITAMINS PO      Take  by mouth daily.    Flu vaccine need       psyllium 58.6 % Powd    METAMUCIL     Take by mouth daily        SINUS RINSE BOTTLE KIT NA      Spray 1 packet in nostril as needed        VITAMIN D-3 PO

## 2018-06-12 NOTE — LETTER
2018       RE: Ruma Vega  [address] [place] [address]     Dear Colleague,    Thank you for referring your patient, Ruma Vega, to the [facility] at [facility]. Please see a copy of my visit note below.    37 year old female, , presents at 28 3/7 weeks for obstetric ultrasound assessment indicated by size greater than dates.    Single fetus    Presentation cephalic    USEGA = 31 0/7 weeks.  EFW = 1669 grams, 3 lbs 11 oz, 99 % for 28 weeks. HC= 95%, AC= 99%      RAYMUNDO = 17.9cm.  FHR = 152bpm     Placenta posterior and grade 1    Comments: LGA pattern of growth in setting of normal GCT    Findings discussed with patient.    Further studies repeat growth ultrasound in 3 weeks.        IKER Preciado MD    Again, thank you for allowing me to participate in the care of your patient.      Sincerely,    [facility] Ultrasound

## 2018-06-14 DIAGNOSIS — O99.810 ABNORMAL O'SULLIVAN GLUCOSE CHALLENGE TEST, ANTEPARTUM: Primary | ICD-10-CM

## 2018-06-18 DIAGNOSIS — O99.810 ABNORMAL O'SULLIVAN GLUCOSE CHALLENGE TEST, ANTEPARTUM: ICD-10-CM

## 2018-06-18 LAB
GLUCOSE 1H P 100 G GLC PO SERPL-MCNC: 138 MG/DL (ref 60–179)
GLUCOSE 2H P 100 G GLC PO SERPL-MCNC: 132 MG/DL (ref 60–154)
GLUCOSE 3H P 100 G GLC PO SERPL-MCNC: 68 MG/DL (ref 60–139)
GLUCOSE P FAST SERPL-MCNC: 89 MG/DL (ref 60–94)

## 2018-06-18 PROCEDURE — 82951 GLUCOSE TOLERANCE TEST (GTT): CPT | Performed by: OBSTETRICS & GYNECOLOGY

## 2018-06-18 PROCEDURE — 82952 GTT-ADDED SAMPLES: CPT | Performed by: OBSTETRICS & GYNECOLOGY

## 2018-06-18 PROCEDURE — 36415 COLL VENOUS BLD VENIPUNCTURE: CPT | Performed by: OBSTETRICS & GYNECOLOGY

## 2018-06-25 ENCOUNTER — OFFICE VISIT (OUTPATIENT)
Dept: OBGYN | Facility: CLINIC | Age: 38
End: 2018-06-25
Attending: OBSTETRICS & GYNECOLOGY
Payer: COMMERCIAL

## 2018-06-25 VITALS
BODY MASS INDEX: 37.64 KG/M2 | HEIGHT: 69 IN | SYSTOLIC BLOOD PRESSURE: 121 MMHG | HEART RATE: 94 BPM | WEIGHT: 254.1 LBS | DIASTOLIC BLOOD PRESSURE: 80 MMHG

## 2018-06-25 DIAGNOSIS — O36.63X0 EXCESSIVE FETAL GROWTH AFFECTING MANAGEMENT OF PREGNANCY IN THIRD TRIMESTER, SINGLE OR UNSPECIFIED FETUS: ICD-10-CM

## 2018-06-25 DIAGNOSIS — Z52.4 KIDNEY DONOR: ICD-10-CM

## 2018-06-25 DIAGNOSIS — D68.51 FACTOR V LEIDEN (H): ICD-10-CM

## 2018-06-25 DIAGNOSIS — O09.90 HIGH RISK PREGNANCY, ANTEPARTUM: Primary | ICD-10-CM

## 2018-06-25 NOTE — PROGRESS NOTES
EVELYN Visit 18    S: Overall doing ok.  Did have terrible cold the last week with congestion which has now been better the last 2 days.  She also has had increased nausea/vomiting recently and can't necessarily attribute it to anything in general.  It comes on spontaneously.  It is not associated with certain food choices or activity.  It is intermittent.  She is wondering maybe if she is eating too much and her stomach can't handle the quantity of food, but does feel she has cut back on amount of food consuming in general.  Denies HA, scotoma, SOB, RUQ pain or increased swelling in her extremities.  Participate in [employer] for work and walked a ton without issues this past .  No real contractions.  No VB or LOF.  + FM.      O:  See OB Flowsheet    A/P: 37 yo  @ 30w2d by early US presents for EVELYN visit  1) Prenatal care: Rh positive, Tiffany negative, Rubella immune, Remainder NOB labs wnl.  EOB labs ordered today.  Normal GCT.  Given LGA did complete 3 hour GTT and passed all values.  2) AMA/Genetic screening: Normal NIPT and Innatal microdeletion testing, AFP normal, Level II US with suboptimal view of heart/profilee, Having a BOY.  Repeat US within normal although persistent right umbilical vein.  Due to this a fetal echo was completed and was normal.   3) Borderline BP: Checking BP at home.  Call with persistent BP > 140/90 and come in for evaluation with BP > 160/110.  Patient understands this recommendation.  Did get baseline preeclampsia labs with slight elevation of ALT.  Was persistently elevated and RUQ US completed without evidence of PADILLA.  Plan per MFM at that time was to repeat serial labs every visit and this has sense resolved.  Plan to re-check with symptoms or change in BP.   4) Screening for malignant neoplasm of cervix: History of LSIL/CHRIS-1 in .  Had NILM pap in  and NILM/HPV negative pap in 2015, not due until 2020.  5) Factor V Leiden heterozygote: Patient seen by  Dr. Smart in the past.  Had negative APS testing.  Is on baby ASA currently.  No personal history of clot.  Plan for 6 weeks Lovenox 40 mg daily postpartum and patient agreeable with this plan, declines during prenatal care as no studies to show in her case beneficial.  Does have visit with Dr. Smart scheduled 7/2/18.  6) History of kidney donation: Given to her father in past in 2010 without altered kidney function.  Had normal baseline creatinine and Urine Pr:Cr.  Creatinine is stable.  7) Vitamin D deficiency: On supplementation, improved at EOB visit  8) Dishydrotic eczema: Uses mometasone medium potency cream with flares.  Discussed low likelihood of any effect with intermittent application.  9) s/p flu shot, Tdap next visit  10) Right thigh numbness: Occasional episodes with activity, resolves with rest and no issues with weight baring, all sensory in nature.  Continue to monitor and call with monitor dysfunction or spreading of paresthesia area.  11) Bilateral hand discomfort: Improved  12) Sleep disturbances: Improved  13) LGA: Growth scan 6/12/18 EFW 99%ile, HC 95%ile, AC 99%ile.  Passed GCT and 3 hour GTT, will complete serial growth scans and discuss method of delivery as appropriate.  She is agreeable with this plan.  14) Labor: Epidural/Breastfeed/Mirena vs Condoms  15) RTC in 2 weeks for Growth US and EVELYN visit    Agnes Wheeler MD

## 2018-06-25 NOTE — LETTER
2018       RE: Ruma Vega  [address]  [facility] [address]     Dear Colleague,    Thank you for referring your patient, Ruma Vega, to the [facility] at [facility]. Please see a copy of my visit note below.    EVELYN Visit 18    S: Overall doing ok.  Did have terrible cold the last week with congestion which has now been better the last 2 days.  She also has had increased nausea/vomiting recently and can't necessarily attribute it to anything in general.  It comes on spontaneously.  It is not associated with certain food choices or activity.  It is intermittent.  She is wondering maybe if she is eating too much and her stomach can't handle the quantity of food, but does feel she has cut back on amount of food consuming in general.  Denies HA, scotoma, SOB, RUQ pain or increased swelling in her extremities.  Participate in [employer] for work and walked a ton without issues this past .  No real contractions.  No VB or LOF.  + FM.      O:  See OB Flowsheet    A/P: 39 yo  @ 30w2d by early US presents for EVELYN visit  1) Prenatal care: Rh positive, Tiffany negative, Rubella immune, Remainder NOB labs wnl.  EOB labs ordered today.  Normal GCT.  Given LGA did complete 3 hour GTT and passed all values.  2) AMA/Genetic screening: Normal NIPT and Innatal microdeletion testing, AFP normal, Level II US with suboptimal view of heart/profilee, Having a BOY.  Repeat US within normal although persistent right umbilical vein.  Due to this a fetal echo was completed and was normal.   3) Borderline BP: Checking BP at home.  Call with persistent BP > 140/90 and come in for evaluation with BP > 160/110.  Patient understands this recommendation.  Did get baseline preeclampsia labs with slight elevation of ALT.  Was persistently elevated and RUQ US completed without evidence of PADILLA.  Plan per M at that time was to repeat serial labs  every visit and this has sense resolved.  Plan to re-check with symptoms or change in BP.   4) Screening for malignant neoplasm of cervix: History of LSIL/CHRIS-1 in 2010.  Had NILM pap in 2011 and NILM/HPV negative pap in 7/2015, not due until 7/2020.  5) Factor V Leiden heterozygote: Patient seen by Dr. Smart in the past.  Had negative APS testing.  Is on baby ASA currently.  No personal history of clot.  Plan for 6 weeks Lovenox 40 mg daily postpartum and patient agreeable with this plan, declines during prenatal care as no studies to show in her case beneficial.  Does have visit with Dr. Smart scheduled 7/2/18.  6) History of kidney donation: Given to her father in past in 2010 without altered kidney function.  Had normal baseline creatinine and Urine Pr:Cr.  Creatinine is stable.  7) Vitamin D deficiency: On supplementation, improved at EOB visit  8) Dishydrotic eczema: Uses mometasone medium potency cream with flares.  Discussed low likelihood of any effect with intermittent application.  9) s/p flu shot, Tdap next visit  10) Right thigh numbness: Occasional episodes with activity, resolves with rest and no issues with weight baring, all sensory in nature.  Continue to monitor and call with monitor dysfunction or spreading of paresthesia area.  11) Bilateral hand discomfort: Improved  12) Sleep disturbances: Improved  13) LGA: Growth scan 6/12/18 EFW 99%ile, HC 95%ile, AC 99%ile.  Passed GCT and 3 hour GTT, will complete serial growth scans and discuss method of delivery as appropriate.  She is agreeable with this plan.  14) Labor: Epidural/Breastfeed/Mirena vs Condoms  15) RTC in 2 weeks for Growth US and EVELYN visit    Agnes Wheeler MD

## 2018-06-25 NOTE — MR AVS SNAPSHOT
After Visit Summary   6/25/2018    Ruma Vega    MRN: [id number]           Patient Information     Date Of Birth          1980        Visit Information        Provider Department      6/25/2018 8:45 AM Agnes Wheeler MD [facility]        Today's Diagnoses     High risk pregnancy, antepartum    -  1    Factor V Leiden (H)        Kidney donor        Excessive fetal growth affecting management of pregnancy in third trimester, single or unspecified fetus           Follow-ups after your visit        Follow-up notes from your care team     Return in about 2 weeks (around 7/9/2018) for Growth US and EVELYN Visit, JONNATHAN BRYANT PER GRAHAM.      Your next 10 appointments already scheduled     Jul 02, 2018  1:00 PM CDT   RETURN CLOTTING DISORDER with Delroy Smart MD   [facility] ([facility])    [address]  [address]  [facility] [address]   [phone]            Jul 09, 2018  3:30 PM CDT   ULTRASOUND with [facility] ULTRASOUND   [facility] ([facility])    [facility] [address]  [facility] [address] [phone]            Jul 09, 2018  4:15 PM CDT   RETURN OB with Agnes Wheeler MD   [facility] ([facility])    [facility] [address]  [address]  [facility] [address] [phone]            Jul 23, 2018  1:30 PM CDT   RETURN OB with Agnes Wheeler MD   [facility] ([facility])    [facility] [address]  [address]  [facility] [address] [phone]            Aug 06, 2018  3:30 PM CDT   ULTRASOUND with [facility] ULTRASOUND   [facility] ([facility])    [facility] [address]  [address]  [facility] [address]    [phone]            Aug 06, 2018  4:00 PM CDT   RETURN OB with Agnes Wheeler MD   [facility] ([facility])    [facility] [facility] [address]  [address]  [facility] [address]   [phone]            Aug 13, 2018  2:15 PM CDT   RETURN OB with Agnes Wheeler MD   [facility] ([facility])    [facility] [address]  [address]  [facility] [address]   [phone]            Aug 20, 2018  4:00 PM CDT   RETURN OB with Agnes Wheeler MD   [facility] ([facility])    [facility] [address]  [address]  [facility] [address]   [phone]            Aug 27, 2018  4:00 PM CDT   RETURN OB with Agnes Wheeler MD   [facility] ([facility])    [facility] [address]  [address]  [facility] [address]   [phone]            Sep 05, 2018 11:30 AM CDT   RETURN OB with Agnes Wheeler MD   [facility] ([facility])    [facility] [address]  [address]  [facility] [address]   [phone]              Future tests that were ordered for you today     Open Future Orders        Priority Expected Expires Ordered    Growth Ultrasound 32180 Routine  10/23/2018 6/26/2018            Who to contact     Please call your clinic at [phone] to:    Ask questions about your health    Make or cancel appointments    Discuss your medicines    Learn about your test results    Speak to your doctor            Additional Information About Your Visit        MyChart Information     [employer] gives you secure access to your electronic health record. If you see a primary care provider, you can also send messages to your care team and make appointments. If you have questions, please call your primary care clinic.  If you do not  "have a primary care provider, please call [phone] and they will assist you.      [employer] is an electronic gateway that provides easy, online access to your medical records. With [employer], you can request a clinic appointment, read your test results, renew a prescription or communicate with your care team.     To access your existing account, please contact your [facility] or call [phone] for assistance.        Care EveryWhere ID     This is your Care EveryWhere ID. This could be used by other organizations to access your [facility] medical records  [id number]        Your Vitals Were     Pulse Height BMI (Body Mass Index)             94 1.753 m (5' 9\") 37.52 kg/m2          Blood Pressure from Last 3 Encounters:   06/25/18 121/80   06/07/18 120/83   05/03/18 124/84    Weight from Last 3 Encounters:   06/25/18 115.3 kg (254 lb 1.6 oz)   06/07/18 115.5 kg (254 lb 11.2 oz)   05/03/18 113.5 kg (250 lb 3.2 oz)               Primary Care Provider Office Phone # Fax #    Shannon Jerri Holder [phone]       [facility] [address] [facility] [address]        Equal Access to Services     JOSÉ ANTONIO WESTFALL : Hadii cindy ku hadasho Soomaali, waaxda luqadaha, qaybta kaalmada adeegyada, waxay idiin haywendien pablo garcia. So [facility] [phone].    ATENCIÓN: Si habla español, tiene a figueroa disposición servicios gratuitos de asistencia lingüística. Llame al [phone].    We comply with applicable federal civil rights laws and [place] laws. We do not discriminate on the basis of race, color, national origin, age, disability, sex, sexual orientation, or gender identity.            Thank you!     Thank you for choosing [facility]  for your care. Our goal is always to provide you with excellent care. Hearing back from our patients is one way we can continue to improve our services. Please take a few minutes to complete the written survey " that you may receive in the mail after your visit with us. Thank you!             Your Updated Medication List - Protect others around you: Learn how to safely use, store and throw away your medicines at www.disposemymeds.org.          This list is accurate as of 6/25/18 11:59 PM.  Always use your most recent med list.                   Brand Name Dispense Instructions for use Diagnosis    aspirin 81 MG tablet      Take by mouth daily        FLONASE 50 MCG/ACT spray   Generic drug:  fluticasone     1 Package    Spray 1-2 sprays into both nostrils daily    Allergic rhinitis       FOLIC ACID PO      Take 800 mcg by mouth daily        ketotifen 0.025 % Soln ophthalmic solution    ZADITOR/REFRESH ANTI-ITCH     Place 1 drop into both eyes 2 times daily        mometasone 0.1 % cream    ELOCON     Apply topically daily        PRENATAL VITAMINS PO      Take  by mouth daily.    Flu vaccine need       psyllium 58.6 % Powd    METAMUCIL     Take by mouth daily        SINUS RINSE BOTTLE KIT NA      Spray 1 packet in nostril as needed        VITAMIN D-3 PO

## 2018-07-02 ENCOUNTER — OFFICE VISIT (OUTPATIENT)
Dept: HEMATOLOGY | Facility: CLINIC | Age: 38
End: 2018-07-02
Attending: INTERNAL MEDICINE
Payer: COMMERCIAL

## 2018-07-02 VITALS
HEIGHT: 69 IN | RESPIRATION RATE: 12 BRPM | SYSTOLIC BLOOD PRESSURE: 138 MMHG | HEART RATE: 82 BPM | TEMPERATURE: 98.6 F | WEIGHT: 246 LBS | DIASTOLIC BLOOD PRESSURE: 88 MMHG | BODY MASS INDEX: 36.43 KG/M2

## 2018-07-02 DIAGNOSIS — D68.51 HETEROZYGOUS FACTOR V LEIDEN AFFECTING PREGNANCY IN THIRD TRIMESTER, ANTEPARTUM (H): Primary | ICD-10-CM

## 2018-07-02 DIAGNOSIS — O99.113 HETEROZYGOUS FACTOR V LEIDEN AFFECTING PREGNANCY IN THIRD TRIMESTER, ANTEPARTUM (H): Primary | ICD-10-CM

## 2018-07-02 PROCEDURE — G0463 HOSPITAL OUTPT CLINIC VISIT: HCPCS

## 2018-07-02 PROCEDURE — 99213 OFFICE O/P EST LOW 20 MIN: CPT | Performed by: INTERNAL MEDICINE

## 2018-07-02 NOTE — MR AVS SNAPSHOT
After Visit Summary   7/2/2018    Ruma Vega    MRN: [id number]           Patient Information     Date Of Birth          1980        Visit Information        Provider Department      7/2/2018 1:00 PM Delroy Smart MD [facility]        Care Instructions    We are recommending that you take Lovenox (enoxaparin) 40 mg daily for 6 weeks after delivery.  They can send you home with the enoxaparin prescription.  Https://www.lovenox.com/ - there is a teaching video on this site about injection techniques.    You should stop your aspirin about 1 week prior to due date and do not need to resume after delivery, unless your OB is recommending this.     Your baby will have a 50/50 chance of having the Factor V Leiden mutation.  It is doubtful that we will ever need to test him for this mutation since you have such a mild family clotting history.      We recommend that you avoid estrogen in the future.    Call the [facility]  at [phone]   -If surgeries or procedures are planned.    -If off anticoagulation, please call during high risk times (long-distance travel, broken      bones or trauma, immobilization, surgery, pregnancy, or taking estrogen).   -Any new symptoms of DVT (deep vein thrombosis) or PE (pulmonary embolism)    -pain     -swelling     -redness    -warmth    -shortness of breath    -chest pain    -coughing up blood    Return to clinic only as needed.    Your nurse clinician is Rossana Vitale RN at phone number  [phone].  If she is unavailable and you have immediate concerns, please call [phone] and ask for a nurse.     Rossana Vitale RN - Nurse Clinician - [facility] - [phone]                Follow-ups after your visit        Your next 10 appointments already scheduled     Jul 09, 2018  3:30 PM CDT   ULTRASOUND with [facility] ULTRASOUND   [facility] ([facility])    [facility] [address]  [address]  [facility] [address]   [phone]            Jul 09, 2018  4:15 PM CDT   RETURN OB with Agnes Wheeler MD   [facility] ([facility])    [facility] [address]  [address]  [facility] [address]   [phone]            Jul 23, 2018  1:30 PM CDT   RETURN OB with Agnes Wheeler MD   [facility] ([facility])    [facility] [address]  [address]  [facility] [address]   [phone]            Aug 06, 2018  3:30 PM CDT   ULTRASOUND with [facility] ULTRASOUND   [facility] ([facility])    [facility] [address]  [address]  [facility] [address]   [phone]            Aug 06, 2018  4:00 PM CDT   RETURN OB with Agnes Wheeler MD   [facility] ([facility])    [facility] [address]  [address]  [facility] [address]   [phone]            Aug 13, 2018  2:15 PM CDT   RETURN OB with Agnes Wheeler MD   [facility] ([facility])    [facility] [address]  [address]  [facility] [address]   [phone]            Aug 20, 2018  4:00 PM CDT   RETURN OB with Agnes Wheeler MD   [facility] ([facility])    [facility] [address]  [address]  [facility] [address]   [phone]            Aug 27, 2018  4:00 PM CDT   RETURN OB with Agnes Wheeler MD   [facility] ([facility])    [facility] [address]  [address]  [facility] [address]   [phone]            Sep 05, 2018 11:30 AM CDT   RETURN OB with Agnes Wheeler MD   [facility]  "([facility])    [facility] [address]  [address]  [address]  [facility] [address] [phone]              Who to contact     If you have questions or need follow up information about today's clinic visit or your schedule please contact [facility] directly at [phone].  Normal or non-critical lab and imaging results will be communicated to you by MyChart, letter or phone within 4 business days after the clinic has received the results. If you do not hear from us within 7 days, please contact the clinic through [facility] or phone. If you have a critical or abnormal lab result, we will notify you by phone as soon as possible.  Submit refill requests through [employer] or call your pharmacy and they will forward the refill request to us. Please allow 3 business days for your refill to be completed.          Additional Information About Your Visit        [facility] Information     [employer] gives you secure access to your electronic health record. If you see a primary care provider, you can also send messages to your care team and make appointments. If you have questions, please call your primary care clinic.  If you do not have a primary care provider, please call [phone] and they will assist you.        Care EveryWhere ID     This is your Care EveryWhere ID. This could be used by other organizations to access your [facility] medical records  [id number]        Your Vitals Were     Pulse Temperature Respirations Height BMI (Body Mass Index)       82 98.6  F (37  C) 12 1.753 m (5' 9\") 36.33 kg/m2        Blood Pressure from Last 3 Encounters:   07/02/18 138/88   06/25/18 121/80   06/07/18 120/83    Weight from Last 3 Encounters:   07/02/18 111.6 kg (246 lb)   06/25/18 115.3 kg (254 lb 1.6 oz)   06/07/18 115.5 kg (254 lb 11.2 oz)              Today, you had the following     No orders found for display       Primary Care Provider Office Phone # Fax #    " Shannon Holder [phone]       [facility] [address]  [facility] [address]        Equal Access to Services     JOSÉ ANTONIO WESTFALL : Hadii aad ku hadsharonsamantha Parks, goodmariama thompson, viviana jeancarlosadam sabrinaseymour, luba matin hayaadiaz bennettadelita nellyindukenna garcia. So [facility] [phone].    ATENCIÓN: Si habla español, tiene a figueroa disposición servicios gratuitos de asistencia lingüística. Llame al [phone].    We comply with applicable federal civil rights laws and [place] laws. We do not discriminate on the basis of race, color, national origin, age, disability, sex, sexual orientation, or gender identity.            Thank you!     Thank you for choosing [facility]  for your care. Our goal is always to provide you with excellent care. Hearing back from our patients is one way we can continue to improve our services. Please take a few minutes to complete the written survey that you may receive in the mail after your visit with us. Thank you!             Your Updated Medication List - Protect others around you: Learn how to safely use, store and throw away your medicines at www.disposemymeds.org.          This list is accurate as of 7/2/18  1:47 PM.  Always use your most recent med list.                   Brand Name Dispense Instructions for use Diagnosis    aspirin 81 MG tablet      Take by mouth daily        FLONASE 50 MCG/ACT spray   Generic drug:  fluticasone     1 Package    Spray 1-2 sprays into both nostrils daily    Allergic rhinitis       FOLIC ACID PO      Take 800 mcg by mouth daily        ketotifen 0.025 % Soln ophthalmic solution    ZADITOR/REFRESH ANTI-ITCH     Place 1 drop into both eyes 2 times daily        mometasone 0.1 % cream    ELOCON     Apply topically daily        PRENATAL VITAMINS PO      Take  by mouth daily.    Flu vaccine need       psyllium 58.6 % Powd    METAMUCIL     Take by mouth daily        SINUS RINSE BOTTLE KIT NA      [place]  1 packet in nostril as needed        VITAMIN D-3 PO

## 2018-07-02 NOTE — NURSING NOTE
Teaching Flowsheet  Relevant Diagnosis: Factor V Leiden heterozygote, hx of 2 miscarriages, currently 31 weeks pregnant, no personal clotting history, father has history of SVT  Teaching Topic: Basics of thrombosis around delivery,    Person(s) involved in teaching:   Patient     Motivation Level:  Asks Questions: Yes    Eager to Learn: Yes  Cooperative: Yes  Receptive (willing/able to accept information): Yes     Patient demonstrates understanding of the following:  Reason for the appointment, diagnosis and treatment plan: Yes  Knowledge of proper use of medications and conditions for which they are ordered (with special attention to potential side effects or drug interactions): Yes  Which situations necessitate calling provider and whom to contact: Yes      Proper use and care of   (medical equip, care aids, etc.): NA  Nutritional needs and diet plan: NA  Pain management techniques: NA  Wound Care: NA  How and/when to access community resources: Yes    Allergies and medications were reviewed and updated.    Reviewed with patient the signs, symptoms of and risk factors for venous thrombosis (VTE) and provided an educational  book mariam, which reviews these facts. And includes the following web links  for further information:  www.stoptheclot.org, www.clotconnect.org.    Patient has been found to be heterozygous  for the Factor V Leiden mutation.     Patient educated about benefits and potential complications of anticoagulation.        Patient verbalized understanding of the above information.  Reviewed and discussed the patient instructions point by point and patient verbalized understanding.They deny further questions at this time.    Copy of the After Visit Summary (AVS) given to patient for future reference. Patient given the contact card for the [facility] and has the appropriate numbers to call with any questions.      Rossana Vitale RN - Nurse Clinician - [facility] - [phone]

## 2018-07-02 NOTE — PROGRESS NOTES
[facility]  Outpatient Clinic Visit    Ruma is a 37-year-old woman who is known to be heterozygous for factor V Leiden who presents today for discussion regarding anticoagulation prophylaxis in the postpartum period.  She is currently 31 weeks pregnant with a due date of 18.  She plans to deliver here at the [facility].      She was found to be heterozygous factor V Leiden through family screening.  She was a kidney donor for her father who is heterozygous for factor V Leiden and thus she was tested prior to that.  Her father has a history of recurrent superficial thrombophlebitis, but no history of deep vein thrombosis or pulmonary embolism.  Similarly, there is no other known family history of venous thrombosis.      Since I last saw her, she has remained healthy.  Her personal and family history of clotting has not changed.      She is hoping for a vaginal delivery, although it sounds as if the baby is going to be quite large and thus may require  section delivery.  She has had no other known medical complications during this pregnancy.     PHYSICAL EXAMINATION:  She looks healthy.  A detailed exam was not performed today.      LABORATORY DATA:  No new labs were obtained here today.      Recent assessment of her renal function shows that it is normal.  A recent CBC also looks good with a white count of 13.7, hemoglobin 11.5 and platelets of 316,000.        ASSESSMENT AND PLAN:   1.  Heterozygous for factor V Leiden with no personal history of thrombosis.   2.  Family history of recurrent superficial thrombophlebitis in her father who also has factor V Leiden.  No other known family history of venous thrombosis.   3.  Third trimester pregnancy with a due date of 2018.      I spent a total of 15 minutes today with Ruma, all of which was counseling and coordination of care.  She is doing quite well with this pregnancy and is excited to be having a boy.  We  reviewed the recommendations suggesting 6 weeks of postpartum prophylaxis for asymptomatic factor V Leiden carriers.  We did briefly discuss that there has been some question about whether that is really necessary for everyone, but together we decided that proceeding with prophylaxis would be the best fit for her.      Thus, she needs to start enoxaparin 40 mg subq every 24 hours following delivery.  Ideally, the first dose will be given within 24 hours of delivery.  We discussed that if she has a  section delivery, the treatment will be the same but we would even more strongly recommend it given that there would be an additional increased risk of thrombosis associated with the surgical delivery.  We also discussed that enoxaparin is safe for breastfeeding.  Lastly, we reviewed the fact that we do not need to test her  for factor V Leiden and that is something that could be revisited in the future.      During the course of our discussion, she asked a number of good questions and appeared to have a clear understanding of our recommendations and plan.  At this point, we can see her back on an as-needed basis.  She has all of our contact information.       Delroy Smart MD  Associate Professor of Medicine  Division of Hematology, Oncology, and Transplantation  Director, [facility]

## 2018-07-02 NOTE — PATIENT INSTRUCTIONS
We are recommending that you take Lovenox (enoxaparin) 40 mg daily for 6 weeks after delivery.  They can send you home with the enoxaparin prescription.  Https://www.lovenox.com/ - there is a teaching video on this site about injection techniques.    You should stop your aspirin about 1 week prior to due date and do not need to resume after delivery, unless your OB is recommending this.     Your baby will have a 50/50 chance of having the Factor V Leiden mutation.  It is doubtful that we will ever need to test him for this mutation since you have such a mild family clotting history.      We recommend that you avoid estrogen in the future.    Call the [facility]  at [phone]   -If surgeries or procedures are planned.    -If off anticoagulation, please call during high risk times (long-distance travel, broken      bones or trauma, immobilization, surgery, pregnancy, or taking estrogen).   -Any new symptoms of DVT (deep vein thrombosis) or PE (pulmonary embolism)    -pain     -swelling     -redness    -warmth    -shortness of breath    -chest pain    -coughing up blood    Return to clinic only as needed.    Your nurse clinician is Rossana Vitale RN at phone number  [phone].  If she is unavailable and you have immediate concerns, please call [phone] and ask for a nurse.     Rossana Vitale RN - Nurse Clinician - [facility] - [phone]

## 2018-07-09 ENCOUNTER — OFFICE VISIT (OUTPATIENT)
Dept: OBGYN | Facility: CLINIC | Age: 38
End: 2018-07-09
Attending: OBSTETRICS & GYNECOLOGY
Payer: COMMERCIAL

## 2018-07-09 VITALS
WEIGHT: 257.6 LBS | HEART RATE: 90 BPM | HEIGHT: 69 IN | BODY MASS INDEX: 38.16 KG/M2 | SYSTOLIC BLOOD PRESSURE: 125 MMHG | DIASTOLIC BLOOD PRESSURE: 84 MMHG

## 2018-07-09 DIAGNOSIS — O36.63X0 EXCESSIVE FETAL GROWTH AFFECTING MANAGEMENT OF PREGNANCY IN THIRD TRIMESTER, SINGLE OR UNSPECIFIED FETUS: ICD-10-CM

## 2018-07-09 DIAGNOSIS — Z23 NEED FOR TDAP VACCINATION: ICD-10-CM

## 2018-07-09 DIAGNOSIS — Z52.4 KIDNEY DONOR: ICD-10-CM

## 2018-07-09 DIAGNOSIS — O09.523 ELDERLY MULTIGRAVIDA IN THIRD TRIMESTER: ICD-10-CM

## 2018-07-09 DIAGNOSIS — D68.51 FACTOR V LEIDEN (H): ICD-10-CM

## 2018-07-09 DIAGNOSIS — O09.90 HIGH RISK PREGNANCY, ANTEPARTUM: Primary | ICD-10-CM

## 2018-07-09 PROCEDURE — 25000128 H RX IP 250 OP 636: Mod: ZF

## 2018-07-09 PROCEDURE — 90471 IMMUNIZATION ADMIN: CPT | Mod: ZF

## 2018-07-09 PROCEDURE — G0463 HOSPITAL OUTPT CLINIC VISIT: HCPCS | Mod: 25

## 2018-07-09 PROCEDURE — 90715 TDAP VACCINE 7 YRS/> IM: CPT | Mod: ZF

## 2018-07-09 PROCEDURE — 76816 OB US FOLLOW-UP PER FETUS: CPT | Mod: ZF

## 2018-07-09 NOTE — PROGRESS NOTES
37 year old female, , presents at 32 2/7 weeks for obstetric ultrasound assessment indicated by LGA, AMA, Factor V Leiden, kidney donor.    Single fetus    Presentation - cephalic    USEGA = 35 0/7 weeks.  EFW = 2,612 grams, 98 % for 32 weeks. AC>99%.      RAYMUNDO = 16.2cm.  FHR = 133bpm     Placenta posterior and grade 0    Comments: Large for gestational age fetus with AC>99%    Findings discussed with patient.    Further studies as clinically indicated..        IKER Mercado MD

## 2018-07-09 NOTE — MR AVS SNAPSHOT
After Visit Summary   7/9/2018    Ruma Vega    MRN: [id number]           Patient Information     Date Of Birth          1980        Visit Information        Provider Department      7/9/2018 4:15 PM Agnes Wheeler MD [facility]        Today's Diagnoses     High risk pregnancy, antepartum    -  1    Elderly multigravida in third trimester        Excessive fetal growth affecting management of pregnancy in third trimester, single or unspecified fetus        Factor V Leiden (H)        Kidney donor        Need for Tdap vaccination           Follow-ups after your visit        Follow-up notes from your care team     Return in about 2 weeks (around 7/23/2018) for Please schedule Growth US prior to visit in 4 weeks.      Your next 10 appointments already scheduled     Jul 23, 2018  1:30 PM CDT   RETURN OB with Agnes Wheeler MD   [facility] ([facility])    [place] [facility] [address]  [address]  [facility] [address]   [phone]            Aug 06, 2018  3:30 PM CDT   ULTRASOUND with [facility] ULTRASOUND   [facility] ([facility])    [facility] [facility] [address]  [address]  [facility] [address]   [phone]            Aug 06, 2018  4:00 PM CDT   RETURN OB with Agnes Wheeler MD   [facility] ([facility])    [facility] [address]  [address]  [facility] [address] [phone]            Aug 13, 2018  2:15 PM CDT   RETURN OB with Agnes Wheeler MD   [facility] ([facility])    [facility] [address]  [address]  [facility] [address]   [phone]            Aug 20, 2018  4:00 PM CDT   RETURN OB with Agnes Wheeler MD   [facility] ([facility])    [facility] [facility] [address]  [address]  "[address]  [address]  [facility] [address]   [phone]            Aug 27, 2018  4:00 PM CDT   RETURN OB with Agnes Wheeler MD   [facility] ([facility])    [place] [facility] [address]  [address]  [address]  [facility] [address]   [phone]            Sep 05, 2018 11:30 AM CDT   RETURN OB with Agnes Wheeler MD   [facility] ([facility])    [place] [facility] [address]  [address]  [address]  [facility] [address]   [phone]              Who to contact     Please call your clinic at [phone] to:    Ask questions about your health    Make or cancel appointments    Discuss your medicines    Learn about your test results    Speak to your doctor            Additional Information About Your Visit        [employer] Information     [employer] gives you secure access to your electronic health record. If you see a primary care provider, you can also send messages to your care team and make appointments. If you have questions, please call your primary care clinic.  If you do not have a primary care provider, please call [phone] and they will assist you.      BlogCN is an electronic gateway that provides easy, online access to your medical records. With Blog[employer], you can request a clinic appointment, read your test results, renew a prescription or communicate with your care team.     To access your existing account, please contact your [facility] or call [phone] for assistance.        Care EveryWhere ID     This is your Care EveryWhere ID. This could be used by other organizations to access your [place] medical records  [id number]        Your Vitals Were     Pulse Height BMI (Body Mass Index)             90 1.753 m (5' 9\") 38.04 kg/m2          Blood Pressure from Last 3 Encounters:   07/09/18 125/84   07/02/18 138/88   06/25/18 121/80    Weight from Last 3 " Encounters:   07/09/18 116.8 kg (257 lb 9.6 oz)   07/02/18 111.6 kg (246 lb)   06/25/18 115.3 kg (254 lb 1.6 oz)              We Performed the Following     TDAP VACCINE (BOOSTRIX)        Primary Care Provider Office Phone # Fax #    Shannon Jerri Holder [phone]       [facility] [address]  [facility] [address]        Equal Access to Services     DEBRA WESTFALL : Hadii aad ku hadasho Soomaali, waaxda luqadaha, qaybta kaalmada adeegyada, waxay idiin hayaan adeeg john shoemaker . So [facility] [phone].    ATENCIÓN: Si habla español, tiene a figueroa disposición servicios gratuitos de asistencia lingüística. [facility] [phone].    We comply with applicable federal civil rights laws and [place] laws. We do not discriminate on the basis of race, color, national origin, age, disability, sex, sexual orientation, or gender identity.            Thank you!     Thank you for choosing [facility]  for your care. Our goal is always to provide you with excellent care. Hearing back from our patients is one way we can continue to improve our services. Please take a few minutes to complete the written survey that you may receive in the mail after your visit with us. Thank you!             Your Updated Medication List - Protect others around you: Learn how to safely use, store and throw away your medicines at www.disposemymeds.org.          This list is accurate as of 7/9/18  9:50 PM.  Always use your most recent med list.                   Brand Name Dispense Instructions for use Diagnosis    aspirin 81 MG tablet      Take by mouth daily        FLONASE 50 MCG/ACT spray   Generic drug:  fluticasone     1 Package    Spray 1-2 sprays into both nostrils daily    Allergic rhinitis       FOLIC ACID PO      Take 800 mcg by mouth daily        ketotifen 0.025 % Soln ophthalmic solution    ZADITOR/REFRESH ANTI-ITCH     Place 1 drop into both eyes 2 times daily        mometasone 0.1 %  cream    ELOCON     Apply topically daily        PRENATAL VITAMINS PO      Take  by mouth daily.    Flu vaccine need       psyllium 58.6 % Powd    METAMUCIL     Take by mouth daily        ranitidine 150 MG tablet    ZANTAC     Take 150 mg by mouth 2 times daily        SINUS RINSE BOTTLE KIT NA      Spray 1 packet in nostril as needed        VITAMIN D-3 PO

## 2018-07-09 NOTE — PROGRESS NOTES
EVELYN Visit 18    S: Doing ok.  Has noticed with initiation of GERD therapy that her nausea/vomiting is improved.  She is only having about twice a week now which is tolerable and she declines further intervention.  She does state she has felt more pressure and discomfort in her upper abdomen and wondering if it I just from growing belly.  Having regular BM, recently has had more loose stools than constipation issues.  Denies fevers/chills.  No urinary symptoms.  Occasional tightenings.  No VB or LOF.  + FM, kept up from sleep one night but happy to appreciate movement.  No HA, scotoma, SOB, RUQ pain or increased swelling in her lower extremities.    O:  See OB Flowsheet    A/P: 39 yo  @ 32w2d by early US presents for EVELYN visit  1) Prenatal care: Rh positive, Tiffany negative, Rubella immune, Remainder NOB labs wnl.  EOB labs ordered today.  Normal GCT.  Given LGA did complete 3 hour GTT and passed all values.  2) AMA/Genetic screening: Normal NIPT and Innatal microdeletion testing, AFP normal, Level II US with suboptimal view of heart/profilee, Having a BOY.  Repeat US within normal although persistent right umbilical vein.  Due to this a fetal echo was completed and was normal.   3) Borderline BP: Checking BP at home.  Call with persistent BP > 140/90 and come in for evaluation with BP > 160/110.  Patient understands this recommendation.  Did get baseline preeclampsia labs with slight elevation of ALT.  Was persistently elevated and RUQ US completed without evidence of PADILLA.  Plan per M at that time was to repeat serial labs every visit and this has sense resolved.  Plan to re-check with symptoms or change in BP.   4) Screening for malignant neoplasm of cervix: History of LSIL/CHRIS-1 in .  Had NILM pap in  and NILM/HPV negative pap in 2015, not due until 2020.  5) Factor V Leiden heterozygote: Patient seen by Dr. Smart 18.  Had negative APS testing.  Is on baby ASA currently.  No personal  history of clot.  Plan for 6 weeks Lovenox 40 mg daily postpartum and start within 24 hours of delivery if able.    6) History of kidney donation: Given to her father in past in 2010 without altered kidney function.  Had normal baseline creatinine and Urine Pr:Cr.  Creatinine is stable.  7) Vitamin D deficiency: On supplementation, improved at EOB visit  8) Dishydrotic eczema: Uses mometasone medium potency cream with flares.  Discussed low likelihood of any effect with intermittent application.  9) s/p flu shot, Tdap next visit  10) Right thigh numbness: Occasional episodes with activity, resolves with rest and no issues with weight baring, all sensory in nature.  Continue to monitor and call with monitor dysfunction or spreading of paresthesia area.  11) Bilateral hand discomfort: Improved  12) Sleep disturbances: Improved  13) LGA: Growth scan 7/9/18 EFW 98%ile, AC>99%ile.  Passed GCT and 3 hour GTT, will complete serial growth scans and discuss method of delivery as appropriate.  She is agreeable with this plan.  14) GERD: Taking Ranitidine and improved  15) Labor: Epidural/Breastfeed/Mirena vs Condoms  16) RTC in 2 weeks for EVELYN visit    Anges Wheeler

## 2018-07-09 NOTE — LETTER
2018       RE: Ruma Vega  [address]  [facility] [address]     Dear Colleague,    Thank you for referring your patient, Ruma Vega, to the [facility] at [facility]. Please see a copy of my visit note below.    EVELYN Visit 18    S: Doing ok.  Has noticed with initiation of GERD therapy that her nausea/vomiting is improved.  She is only having about twice a week now which is tolerable and she declines further intervention.  She does state she has felt more pressure and discomfort in her upper abdomen and wondering if it I just from growing belly.  Having regular BM, recently has had more loose stools than constipation issues.  Denies fevers/chills.  No urinary symptoms.  Occasional tightenings.  No VB or LOF.  + FM, kept up from sleep one night but happy to appreciate movement.  No HA, scotoma, SOB, RUQ pain or increased swelling in her lower extremities.    O:  See OB Flowsheet    A/P: 39 yo  @ 32w2d by early US presents for EVELYN visit  1) Prenatal care: Rh positive, Tiffany negative, Rubella immune, Remainder NOB labs wnl.  EOB labs ordered today.  Normal GCT.  Given LGA did complete 3 hour GTT and passed all values.  2) AMA/Genetic screening: Normal NIPT and Innatal microdeletion testing, AFP normal, Level II US with suboptimal view of heart/profilee, Having a BOY.  Repeat US within normal although persistent right umbilical vein.  Due to this a fetal echo was completed and was normal.   3) Borderline BP: Checking BP at home.  Call with persistent BP > 140/90 and come in for evaluation with BP > 160/110.  Patient understands this recommendation.  Did get baseline preeclampsia labs with slight elevation of ALT.  Was persistently elevated and RUQ US completed without evidence of PADILLA.  Plan per MFM at that time was to repeat serial labs every visit and this has sense resolved.  Plan to re-check with symptoms or change in  BP.   4) Screening for malignant neoplasm of cervix: History of LSIL/CHRIS-1 in 2010.  Had NILM pap in 2011 and NILM/HPV negative pap in 7/2015, not due until 7/2020.  5) Factor V Leiden heterozygote: Patient seen by Dr. Smart 7/2/18.  Had negative APS testing.  Is on baby ASA currently.  No personal history of clot.  Plan for 6 weeks Lovenox 40 mg daily postpartum and start within 24 hours of delivery if able.    6) History of kidney donation: Given to her father in past in 2010 without altered kidney function.  Had normal baseline creatinine and Urine Pr:Cr.  Creatinine is stable.  7) Vitamin D deficiency: On supplementation, improved at EOB visit  8) Dishydrotic eczema: Uses mometasone medium potency cream with flares.  Discussed low likelihood of any effect with intermittent application.  9) s/p flu shot, Tdap next visit  10) Right thigh numbness: Occasional episodes with activity, resolves with rest and no issues with weight baring, all sensory in nature.  Continue to monitor and call with monitor dysfunction or spreading of paresthesia area.  11) Bilateral hand discomfort: Improved  12) Sleep disturbances: Improved  13) LGA: Growth scan 7/9/18 EFW 98%ile, AC>99%ile.  Passed GCT and 3 hour GTT, will complete serial growth scans and discuss method of delivery as appropriate.  She is agreeable with this plan.  14) GERD: Taking Ranitidine and improved  15) Labor: Epidural/Breastfeed/Mirena vs Condoms  16) RTC in 2 weeks for EVELYN visit    Agnes Wheeler

## 2018-07-09 NOTE — LETTER
2018       RE: Ruma Vega  [address] [facility] [address]     Dear Colleague,    Thank you for referring your patient, Ruma Vega, to the [facility] at [facility]. Please see a copy of my visit note below.    37 year old female, , presents at 32 2/7 weeks for obstetric ultrasound assessment indicated by LGA, AMA, Factor V Leiden, kidney donor.    Single fetus    Presentation - cephalic    USEGA = 35 0/7 weeks.  EFW = 2,612 grams, 98 % for 32 weeks. AC>99%.      RAYMUNDO = 16.2cm.  FHR = 133bpm     Placenta posterior and grade 0    Comments: Large for gestational age fetus with AC>99%    Findings discussed with patient.    Further studies as clinically indicated..        IKER Mercado MD          Again, thank you for allowing me to participate in the care of your patient.      Sincerely,    [facility]

## 2018-07-09 NOTE — MR AVS SNAPSHOT
After Visit Summary   7/9/2018    Ruma Vega    MRN: [id number]           Patient Information     Date Of Birth          1980        Visit Information        Provider Department      7/9/2018 3:30 PM [facility] ULTRASOUND [facility]        Today's Diagnoses     Excessive fetal growth affecting management of pregnancy in third trimester, single or unspecified fetus           Follow-ups after your visit        Your next 10 appointments already scheduled     Jul 23, 2018  1:30 PM CDT   RETURN OB with Agnes Wheeler MD   [facility] ([facility])    [place] [facility] [address]  [address]  [address]  [place] [address] [phone]            Aug 06, 2018  3:30 PM CDT   ULTRASOUND with [facility] ULTRASOUND   [facility] ([facility])    [facility] [facility] [address]  [address]  [address]  [facility] [address] [phone]            Aug 06, 2018  4:00 PM CDT   RETURN OB with Agnes Wheeler MD   [facility] ([facility])    [facility] [facility] [address]  [address]  [address]  [facility] [address] [phone]            Aug 13, 2018  2:15 PM CDT   RETURN OB with Agnes Wheeler MD   [facility] ([facility])    [facility] [facility] [address]  [address]  [facility] [address] [phone]            Aug 20, 2018  4:00 PM CDT   RETURN OB with Agnes Wheeler MD   [facility] ([facility])    [facility] [facility] [address]  [address]  [facility] [address] [phone]            Aug 27, 2018  4:00 PM CDT   RETURN OB with Agnes Wheeler MD   [facility] ([facility])    [facility] [facility] [address]  [address]  [facility] [address] [phone]             Sep 05, 2018 11:30 AM CDT   RETURN OB with Agnes Wheeler MD   [facility] ([facility])    [place] [facility] [address]  [address]  [address]  [facility] [address] [phone]              Who to contact     Please call your clinic at [phone] to:    Ask questions about your health    Make or cancel appointments    Discuss your medicines    Learn about your test results    Speak to your doctor            Additional Information About Your Visit        [employer] Information     [employer] gives you secure access to your electronic health record. If you see a primary care provider, you can also send messages to your care team and make appointments. If you have questions, please call your primary care clinic.  If you do not have a primary care provider, please call [phone] and they will assist you.      [employer] is an electronic gateway that provides easy, online access to your medical records. With [employer], you can request a clinic appointment, read your test results, renew a prescription or communicate with your care team.     To access your existing account, please contact your [facility] or call [phone] for assistance.        Care EveryWhere ID     This is your Care EveryWhere ID. This could be used by other organizations to access your [place] medical records  [id number]         Blood Pressure from Last 3 Encounters:   07/09/18 125/84   07/02/18 138/88   06/25/18 121/80    Weight from Last 3 Encounters:   07/09/18 116.8 kg (257 lb 9.6 oz)   07/02/18 111.6 kg (246 lb)   06/25/18 115.3 kg (254 lb 1.6 oz)              We Performed the Following     Growth Ultrasound [id number]        Primary Care Provider Office Phone # Fax #    Shannon Jerri Holder [phone]       [facility] [address]  [facility] [address]        Equal Access to Services     JOSÉ ANTONIO WESTFALL AH: Franck wellington  Sorobert, waloganda luqadaha, qaybta kaalfrances gonzalez, luba masters sabrinaadelita nellypratibha latrinidaddiaz jose. So [facility] [phone].    ATENCIÓN: Si mike miller, tiene a figueroa disposición servicios gratuitos de asistencia lingüística. [place] [facility] [phone].    We comply with applicable federal civil rights laws and [place] laws. We do not discriminate on the basis of race, color, national origin, age, disability, sex, sexual orientation, or gender identity.            Thank you!     Thank you for choosing [facility]  for your care. Our goal is always to provide you with excellent care. Hearing back from our patients is one way we can continue to improve our services. Please take a few minutes to complete the written survey that you may receive in the mail after your visit with us. Thank you!             Your Updated Medication List - Protect others around you: Learn how to safely use, store and throw away your medicines at www.disposemymeds.org.          This list is accurate as of 7/9/18 11:59 PM.  Always use your most recent med list.                   Brand Name Dispense Instructions for use Diagnosis    aspirin 81 MG tablet      Take by mouth daily        FLONASE 50 MCG/ACT spray   Generic drug:  fluticasone     1 Package    Spray 1-2 sprays into both nostrils daily    Allergic rhinitis       FOLIC ACID PO      Take 800 mcg by mouth daily        ketotifen 0.025 % Soln ophthalmic solution    ZADITOR/REFRESH ANTI-ITCH     Place 1 drop into both eyes 2 times daily        mometasone 0.1 % cream    ELOCON     Apply topically daily        PRENATAL VITAMINS PO      Take  by mouth daily.    Flu vaccine need       psyllium 58.6 % Powd    METAMUCIL     Take by mouth daily        ranitidine 150 MG tablet    ZANTAC     Take 150 mg by mouth 2 times daily        SINUS RINSE BOTTLE KIT NA      Spray 1 packet in nostril as needed        VITAMIN D-3 PO

## 2018-07-23 ENCOUNTER — OFFICE VISIT (OUTPATIENT)
Dept: OBGYN | Facility: CLINIC | Age: 38
End: 2018-07-23
Attending: OBSTETRICS & GYNECOLOGY
Payer: COMMERCIAL

## 2018-07-23 VITALS
WEIGHT: 261.8 LBS | DIASTOLIC BLOOD PRESSURE: 85 MMHG | BODY MASS INDEX: 38.78 KG/M2 | SYSTOLIC BLOOD PRESSURE: 123 MMHG | HEART RATE: 98 BPM | HEIGHT: 69 IN

## 2018-07-23 DIAGNOSIS — O09.90 HIGH RISK PREGNANCY, ANTEPARTUM: Primary | ICD-10-CM

## 2018-07-23 DIAGNOSIS — D68.51 FACTOR V LEIDEN (H): ICD-10-CM

## 2018-07-23 DIAGNOSIS — O09.523 ELDERLY MULTIGRAVIDA IN THIRD TRIMESTER: ICD-10-CM

## 2018-07-23 DIAGNOSIS — Z52.4 KIDNEY DONOR: ICD-10-CM

## 2018-07-23 DIAGNOSIS — O36.63X0 EXCESSIVE FETAL GROWTH AFFECTING MANAGEMENT OF PREGNANCY IN THIRD TRIMESTER, SINGLE OR UNSPECIFIED FETUS: ICD-10-CM

## 2018-07-23 NOTE — MR AVS SNAPSHOT
After Visit Summary   7/23/2018    Ruma Vega    MRN: [id number]           Patient Information     Date Of Birth          1980        Visit Information        Provider Department      7/23/2018 1:30 PM Agnes Wheeler MD [facility]        Today's Diagnoses     High risk pregnancy, antepartum    -  1    Elderly multigravida in third trimester        Excessive fetal growth affecting management of pregnancy in third trimester, single or unspecified fetus        Factor V Leiden (H)        Kidney donor           Follow-ups after your visit        Follow-up notes from your care team     Return in about 2 weeks (around 8/6/2018) for Growth US and EVELYN Visit.      Your next 10 appointments already scheduled     Aug 06, 2018  3:30 PM CDT   (Arrive by 3:15 PM)   US OB SINGLE FOLLOW UP REPEAT with [email or web address]   [facility] ([facility])    [place] [facility]  3rd Floor, [address]  [address] [place] [address] [phone]           Please bring a list of your medicines (including vitamins, minerals and over-the-counter drugs). Also, tell your doctor about any allergies you may have. Wear comfortable clothes and leave your valuables at home.  Drink four 8-ounce glasses of fluid an hour before your exam. If you need to empty your bladder before your exam, try to release only a little urine. Then, drink another glass of fluid.  You may have up to two family members in the exam room. If you bring a small child, an adult must be there to care for him or her. No video or camera photography during the procedure.  Please call the Imaging Department at your exam site with any questions.            Aug 06, 2018  4:00 PM CDT   RETURN OB with Agnes Wheeler MD   [facility] ([facility])    [place] [facility] [address]  3rd Flr,[address]  [address] [place] [phone]    [phone]            Aug 13, 2018  2:15 PM CDT   RETURN OB with Agnes Wheeler MD   [facility] ([facility])    [place] [facility] [address]  3rd [address]  [address] [facility] [address] [phone]            Aug 20, 2018  4:00 PM CDT   RETURN OB with Agnes Wheeler MD   [facility] ([facility])    [place] [facility] 88  3rd Flr,[address]  [address] [facility] [address] [phone]            Aug 27, 2018  3:30 PM CDT   (Arrive by 3:15 PM)   US OB SINGLE FOLLOW UP REPEAT with [email or web address]   [facility] ([facility])    [place] [facility]  3rd Floor, [address]  [address] [place] [id number] [phone]           Please bring a list of your medicines (including vitamins, minerals and over-the-counter drugs). Also, tell your doctor about any allergies you may have. Wear comfortable clothes and leave your valuables at home.  Drink four 8-ounce glasses of fluid an hour before your exam. If you need to empty your bladder before your exam, try to release only a little urine. Then, drink another glass of fluid.  You may have up to two family members in the exam room. If you bring a small child, an adult must be there to care for him or her. No video or camera photography during the procedure.  Please call the Imaging Department at your exam site with any questions.            Aug 27, 2018  4:00 PM CDT   RETURN OB with Agnes Wheeler MD   [facility] ([facility])    [place] [facility] [address]  [address]rd [address]  [address]  [facility] [address] [phone]            Sep 05, 2018 11:30 AM CDT   RETURN OB with Agnes Wheeler MD   [facility] ([facility])    [place] [facility] [address]  [address]rd Fl[address]  [address]  [facility] [address] [phone]             "  Future tests that were ordered for you today     Open Future Orders        Priority Expected Expires Ordered    Growth Ultrasound 68447 Routine  11/20/2018 7/23/2018            Who to contact     Please call your clinic at [phone] to:    Ask questions about your health    Make or cancel appointments    Discuss your medicines    Learn about your test results    Speak to your doctor            Additional Information About Your Visit        [email or web address] Information     [employer] gives you secure access to your electronic health record. If you see a primary care provider, you can also send messages to your care team and make appointments. If you have questions, please call your primary care clinic.  If you do not have a primary care provider, please call [phone] and they will assist you.      [employer] is an electronic gateway that provides easy, online access to your medical records. With [employer], you can request a clinic appointment, read your test results, renew a prescription or communicate with your care team.     To access your existing account, please contact your [facility] or call [phone] for assistance.        Care EveryWhere ID     This is your Care EveryWhere ID. This could be used by other organizations to access your [facility] medical records  [id number]        Your Vitals Were     Pulse Height BMI (Body Mass Index)             98 1.753 m (5' 9\") 38.66 kg/m2          Blood Pressure from Last 3 Encounters:   07/23/18 123/85   07/09/18 125/84   07/02/18 138/88    Weight from Last 3 Encounters:   07/23/18 118.8 kg (261 lb 12.8 oz)   07/09/18 116.8 kg (257 lb 9.6 oz)   07/02/18 111.6 kg (246 lb)              Today, you had the following     No orders found for display       Primary Care Provider Office Phone # Fax #    Shannon Holder [phone]       [facility] [address] [place] [address]        Equal Access to Services     " JOSÉ ANTONIO [facility]: Hadii aad ku eliz Parks, waaxda luqadaha, qaybta kaalmada adeseymour, luba burton frankiediaz villegasindukenna shoemaker . So [facility] [phone].    ATENCIÓN: Si habla paul, tiene a figueroa disposición servicios gratuitos de asistencia lingüística. Llame al [phone].    We comply with applicable federal civil rights laws and [place] laws. We do not discriminate on the basis of race, color, national origin, age, disability, sex, sexual orientation, or gender identity.            Thank you!     Thank you for choosing [facility]  for your care. Our goal is always to provide you with excellent care. Hearing back from our patients is one way we can continue to improve our services. Please take a few minutes to complete the written survey that you may receive in the mail after your visit with us. Thank you!             Your Updated Medication List - Protect others around you: Learn how to safely use, store and throw away your medicines at www.disposemymeds.org.          This list is accurate as of 7/23/18 11:59 PM.  Always use your most recent med list.                   Brand Name Dispense Instructions for use Diagnosis    aspirin 81 MG tablet      Take by mouth daily        FLONASE 50 MCG/ACT spray   Generic drug:  fluticasone     1 Package    Spray 1-2 sprays into both nostrils daily    Allergic rhinitis       FOLIC ACID PO      Take 800 mcg by mouth daily        ketotifen 0.025 % Soln ophthalmic solution    ZADITOR/REFRESH ANTI-ITCH     Place 1 drop into both eyes 2 times daily        mometasone 0.1 % cream    ELOCON     Apply topically daily        PRENATAL VITAMINS PO      Take  by mouth daily.    Flu vaccine need       psyllium 58.6 % Powd    METAMUCIL     Take by mouth daily        ranitidine 150 MG tablet    ZANTAC     Take 150 mg by mouth 2 times daily        SINUS RINSE BOTTLE KIT NA      Spray 1 packet in nostril as needed        VITAMIN D-3 PO

## 2018-07-23 NOTE — PROGRESS NOTES
EVELYN Visit 18    S: Overall doing well.  No further sinus issues but continues to congestion but manageable.  Snoring more.  Occasional tightenings/pain but nothing concerning or regular.  Denies VB or LOF.  + FM.  Denies HA, scotoma, SOB, RUQ pain.  Has noticed more persistence of swelling over night now, but not too much.  Questions about delivery and route recommended given size of baby on US.    O:  See OB Flowsheet    A/P: 39 yo  @ 34w2d by early US presents for EVELYN visit  1) Prenatal care: Rh positive, Tiffany negative, Rubella immune, Remainder NOB labs wnl.  Normal GCT.  Given LGA did complete 3 hour GTT and passed all values.  2) AMA/Genetic screening: Normal NIPT and Innatal microdeletion testing, AFP normal, Level II US with suboptimal view of heart/profilee, Having a BOY.  Repeat US within normal although persistent right umbilical vein.  Due to this a fetal echo was completed and was normal.   3) Borderline BP: Checking BP at home.  Call with persistent BP > 140/90 and come in for evaluation with BP > 160/110.  Patient understands this recommendation.  Did get baseline preeclampsia labs with slight elevation of ALT.  Was persistently elevated and RUQ US completed without evidence of PADILLA.  Plan per M at that time was to repeat serial labs every visit and this has sense resolved.  Plan to re-check with symptoms or change in BP.   4) Screening for malignant neoplasm of cervix: History of LSIL/CHRIS-1 in .  Had NILM pap in  and NILM/HPV negative pap in 2015, not due until 2020.  5) Factor V Leiden heterozygote: Patient seen by Dr. Smart 18.  Had negative APS testing.  Is on baby ASA currently.  No personal history of clot.  Plan for 6 weeks Lovenox 40 mg daily postpartum and start within 12-24 hours of delivery if able.    6) History of kidney donation: Given to her father in  without altered kidney function.  Had normal baseline creatinine and Urine Pr:Cr.  Creatinine is  stable.  7) Vitamin D deficiency: On supplementation, improved at EOB visit  8) Dishydrotic eczema: Uses mometasone medium potency cream with flares.  Discussed low likelihood of any effect with intermittent application.  9) s/p flu shot, Tdap  10) Right thigh numbness: Occasional episodes with activity, resolves with rest and no issues with weight baring, all sensory in nature.  Continue to monitor and call with monitor dysfunction or spreading of paresthesia area.  11) Bilateral hand discomfort: Improved  12) Sleep disturbances: Improved  13) LGA: Growth scan 7/9/18 EFW 98%ile, AC>99%ile.  Passed GCT and 3 hour GTT, will complete serial growth scans and discuss method of delivery as appropriate.  She is agreeable with this plan.  14) GERD: Taking Ranitidine and improved  15) Labor: Epidural/Breastfeed/Mirena vs Condoms  16) RTC in 2 weeks for EVELYN visit, GBS at that time    Agnes Wheeler MD

## 2018-07-23 NOTE — LETTER
2018       RE: Ruma Vega  [address]  [place] [address]     Dear Colleague,    Thank you for referring your patient, Ruma Vega, to the [facility] at [facility]. Please see a copy of my visit note below.    EVELYN Visit 18    S: Overall doing well.  No further sinus issues but continues to congestion but manageable.  Snoring more.  Occasional tightenings/pain but nothing concerning or regular.  Denies VB or LOF.  + FM.  Denies HA, scotoma, SOB, RUQ pain.  Has noticed more persistence of swelling over night now, but not too much.  Questions about delivery and route recommended given size of baby on US.    O:  See OB Flowsheet    A/P: 37 yo  @ 34w2d by early US presents for EVELYN visit  1) Prenatal care: Rh positive, Tiffany negative, Rubella immune, Remainder NOB labs wnl.  Normal GCT.  Given LGA did complete 3 hour GTT and passed all values.  2) AMA/Genetic screening: Normal NIPT and Innatal microdeletion testing, AFP normal, Level II US with suboptimal view of heart/profilee, Having a BOY.  Repeat US within normal although persistent right umbilical vein.  Due to this a fetal echo was completed and was normal.   3) Borderline BP: Checking BP at home.  Call with persistent BP > 140/90 and come in for evaluation with BP > 160/110.  Patient understands this recommendation.  Did get baseline preeclampsia labs with slight elevation of ALT.  Was persistently elevated and RUQ US completed without evidence of PADILLA.  Plan per MFM at that time was to repeat serial labs every visit and this has sense resolved.  Plan to re-check with symptoms or change in BP.   4) Screening for malignant neoplasm of cervix: History of LSIL/CHRIS-1 in .  Had NILM pap in  and NILM/HPV negative pap in 2015, not due until 2020.  5) Factor V Leiden heterozygote: Patient seen by Dr. Smart 18.  Had negative APS testing.  Is on baby ASA  currently.  No personal history of clot.  Plan for 6 weeks Lovenox 40 mg daily postpartum and start within 12-24 hours of delivery if able.    6) History of kidney donation: Given to her father in 2010 without altered kidney function.  Had normal baseline creatinine and Urine Pr:Cr.  Creatinine is stable.  7) Vitamin D deficiency: On supplementation, improved at EOB visit  8) Dishydrotic eczema: Uses mometasone medium potency cream with flares.  Discussed low likelihood of any effect with intermittent application.  9) s/p flu shot, Tdap  10) Right thigh numbness: Occasional episodes with activity, resolves with rest and no issues with weight baring, all sensory in nature.  Continue to monitor and call with monitor dysfunction or spreading of paresthesia area.  11) Bilateral hand discomfort: Improved  12) Sleep disturbances: Improved  13) LGA: Growth scan 7/9/18 EFW 98%ile, AC>99%ile.  Passed GCT and 3 hour GTT, will complete serial growth scans and discuss method of delivery as appropriate.  She is agreeable with this plan.  14) GERD: Taking Ranitidine and improved  15) Labor: Epidural/Breastfeed/Mirena vs Condoms  16) RTC in 2 weeks for EVELYN visit, GBS at that time    Agnes Wheeler MD

## 2018-08-06 ENCOUNTER — OFFICE VISIT (OUTPATIENT)
Dept: OBGYN | Facility: CLINIC | Age: 38
End: 2018-08-06
Attending: OBSTETRICS & GYNECOLOGY
Payer: COMMERCIAL

## 2018-08-06 ENCOUNTER — RADIANT APPOINTMENT (OUTPATIENT)
Dept: ULTRASOUND IMAGING | Facility: CLINIC | Age: 38
End: 2018-08-06
Attending: OBSTETRICS & GYNECOLOGY
Payer: COMMERCIAL

## 2018-08-06 VITALS
DIASTOLIC BLOOD PRESSURE: 86 MMHG | BODY MASS INDEX: 38.7 KG/M2 | SYSTOLIC BLOOD PRESSURE: 123 MMHG | HEIGHT: 69 IN | WEIGHT: 261.3 LBS | HEART RATE: 81 BPM

## 2018-08-06 DIAGNOSIS — O09.90 HIGH RISK PREGNANCY, ANTEPARTUM: Primary | ICD-10-CM

## 2018-08-06 DIAGNOSIS — D68.51 FACTOR V LEIDEN (H): ICD-10-CM

## 2018-08-06 DIAGNOSIS — Z52.4 KIDNEY DONOR: ICD-10-CM

## 2018-08-06 DIAGNOSIS — O09.523 ELDERLY MULTIGRAVIDA IN THIRD TRIMESTER: ICD-10-CM

## 2018-08-06 DIAGNOSIS — O09.90 SUPERVISION OF HIGH RISK PREGNANCY, ANTEPARTUM: ICD-10-CM

## 2018-08-06 DIAGNOSIS — O36.63X0 EXCESSIVE FETAL GROWTH AFFECTING MANAGEMENT OF PREGNANCY IN THIRD TRIMESTER, SINGLE OR UNSPECIFIED FETUS: ICD-10-CM

## 2018-08-06 PROCEDURE — 76816 OB US FOLLOW-UP PER FETUS: CPT

## 2018-08-06 PROCEDURE — 87653 STREP B DNA AMP PROBE: CPT | Performed by: OBSTETRICS & GYNECOLOGY

## 2018-08-06 NOTE — MR AVS SNAPSHOT
After Visit Summary   8/6/2018    Ruma Vega    MRN: [id number]           Patient Information     Date Of Birth          1980        Visit Information        Provider Department      8/6/2018 4:00 PM Agnes Wheeler MD [facility]        Today's Diagnoses     High risk pregnancy, antepartum    -  1    Elderly multigravida in third trimester        Factor V Leiden (H)        Kidney donor        Excessive fetal growth affecting management of pregnancy in third trimester, single or unspecified fetus           Follow-ups after your visit        Follow-up notes from your care team     Return in about 1 week (around 8/13/2018) for EVELYN Visit.      Your next 10 appointments already scheduled     Aug 13, 2018  2:15 PM CDT   RETURN OB with Agnes Wheeler MD   [facility] ([facility])    [place] [facility] [address]  3rd Flr[address]  [address] [place] [address]   [phone]            Aug 20, 2018  4:00 PM CDT   RETURN OB with Agnes Wheeler MD   [facility] ([facility])    [place] [facility] [address]  [address]rd [address]  [address] [place] [id number] [phone]            Aug 27, 2018  3:30 PM CDT   (Arrive by 3:15 PM)   US OB SINGLE FOLLOW UP REPEAT with [email or web address]   [facility] ([facility])    [place] [facility]  3rd Floor, [address]  [address] [place] [address] [phone]           Please bring a list of your medicines (including vitamins, minerals and over-the-counter drugs). Also, tell your doctor about any allergies you may have. Wear comfortable clothes and leave your valuables at home.  Drink four 8-ounce glasses of fluid an hour before your exam. If you need to empty your bladder before your exam, try to release only a little urine. Then, drink another glass of fluid.  You may have up to two family  members in the exam room. If you bring a small child, an adult must be there to care for him or her. No video or camera photography during the procedure.  Please call the Imaging Department at your exam site with any questions.            Aug 27, 2018  4:00 PM CDT   RETURN OB with Agnes Wheeler MD   [facility] ([facility])    [place] [facility] [address]  [address]rd [address]  [address]  [facility] [phone]            Sep 05, 2018 11:30 AM CDT   RETURN OB with Agnes Wheeler MD   [facility] ([facility])    [place] [facility] [address]  [address]rd [address]  [address]  [facility] [address] [phone]              Who to contact     Please call your clinic at [phone] to:    Ask questions about your health    Make or cancel appointments    Discuss your medicines    Learn about your test results    Speak to your doctor            Additional Information About Your Visit        [employer] Information     [employer] gives you secure access to your electronic health record. If you see a primary care provider, you can also send messages to your care team and make appointments. If you have questions, please call your primary care clinic.  If you do not have a primary care provider, please call [phone] and they will assist you.      [employer] is an electronic gateway that provides easy, online access to your medical records. With [employer], you can request a clinic appointment, read your test results, renew a prescription or communicate with your care team.     To access your existing account, please contact your [facility] or call [phone] for assistance.        Care EveryWhere ID     This is your Care EveryWhere ID. This could be used by other organizations to access your [facility] medical records  [id number]        Your Vitals Were     Pulse Height BMI (Body Mass Index)             81  "1.753 m (5' 9\") 38.59 kg/m2          Blood Pressure from Last 3 Encounters:   08/06/18 123/86   07/23/18 123/85   07/09/18 125/84    Weight from Last 3 Encounters:   08/06/18 118.5 kg (261 lb 4.8 oz)   07/23/18 118.8 kg (261 lb 12.8 oz)   07/09/18 116.8 kg (257 lb 9.6 oz)              We Performed the Following     Group B strep PCR     Kim-Operative Worksheet (WHS)        Primary Care Provider Office Phone # Fax #    Shannon Jerri Holder [phone]       [facility] [address]  [facility] [address]        Equal Access to Services     DEBRA WESTFALL : Franck Parks, waaxda luqadaha, qaybta kaalmada carlos, luba garcia. So [facility] [phone].    ATENCIÓN: Si habla español, tiene a figueroa disposición servicios gratuitos de asistencia lingüística. [facility] [phone].    We comply with applicable federal civil rights laws and [place] laws. We do not discriminate on the basis of race, color, national origin, age, disability, sex, sexual orientation, or gender identity.            Thank you!     [facility] you [facility]  for your care. Our goal is always to provide you with excellent care. Hearing back from our patients is one way we can continue to improve our services. Please take a few minutes to complete the written survey that you may receive in the mail after your visit with us. Thank you!             Your Updated Medication List - Protect others around you: Learn how to safely use, store and throw away your medicines at www.disposemymeds.org.          This list is accurate as of 8/6/18 11:59 PM.  Always use your most recent med list.                   Brand Name Dispense Instructions for use Diagnosis    aspirin 81 MG tablet      Take by mouth daily        FLONASE 50 MCG/ACT spray   Generic drug:  fluticasone     1 Package    Spray 1-2 sprays into both nostrils daily    Allergic rhinitis       FOLIC " ACID PO      Take 800 mcg by mouth daily        ketotifen 0.025 % Soln ophthalmic solution    ZADITOR/REFRESH ANTI-ITCH     Place 1 drop into both eyes 2 times daily        mometasone 0.1 % cream    ELOCON     Apply topically daily        PRENATAL VITAMINS PO      Take  by mouth daily.    Flu vaccine need       psyllium 58.6 % Powd    METAMUCIL     Take by mouth daily        ranitidine 150 MG tablet    ZANTAC     Take 150 mg by mouth 2 times daily        SINUS RINSE BOTTLE KIT NA      Spray 1 packet in nostril as needed        VITAMIN D-3 PO

## 2018-08-06 NOTE — PROGRESS NOTES
EVELYN Visit Note 18    S: Doing ok, occasional ctx but nothing regular or painful.  Denies VB or LOF.  + FM.  Denies HA, scotoma, SOB, RUQ pain.  Has had some more swelling in hands and feet but improved with rest, still wearing rings.  Biggest concern is of size of baby, wants safest delivery possible for baby.    O:  See OB Flowsheet    A/P: 39 yo  @ 36w2d by early US presents for EVELYN visit  1) Prenatal care: Rh positive, Tiffany negative, Rubella immune, Remainder NOB labs wnl.  Normal GCT.  Given LGA did complete 3 hour GTT and passed all values.  2) AMA/Genetic screening: Normal NIPT and Innatal microdeletion testing, AFP normal, Level II US with suboptimal view of heart/profilee, Having a BOY.  Repeat US within normal although persistent right umbilical vein.  Due to this a fetal echo was completed and was normal.   3) Borderline BP: Checking BP at home.  Call with persistent BP > 140/90 and come in for evaluation with BP > 160/110.  Patient understands this recommendation.  Did get baseline preeclampsia labs with slight elevation of ALT.  Was persistently elevated and RUQ US completed without evidence of PADILLA.  Plan per MFM at that time was to repeat serial labs every visit and this has sense resolved.  Plan to re-check with symptoms or change in BP.   4) Screening for malignant neoplasm of cervix: History of LSIL/CHRIS-1 in .  Had NILM pap in  and NILM/HPV negative pap in 2015, not due until 2020.  5) Factor V Leiden heterozygote: Patient seen by Dr. Smart 18.  Had negative APS testing.  Is on baby ASA currently.  No personal history of clot.  Plan for 6 weeks Lovenox 40 mg daily postpartum and start within 12-24 hours of delivery if able.    6) History of kidney donation: Given to her father in  without altered kidney function.  Had normal baseline creatinine and Urine Pr:Cr.  Creatinine is stable.  7) Vitamin D deficiency: On supplementation, improved at EOB visit  8) Dishydrotic  eczema: Uses mometasone medium potency cream with flares.  Discussed low likelihood of any effect with intermittent application.  9) s/p flu shot, Tdap  10) Right thigh numbness: Occasional episodes with activity, resolves with rest and no issues with weight baring, all sensory in nature.  Continue to monitor and call with monitor dysfunction or spreading of paresthesia area.  11) Bilateral hand discomfort: Improved  12) Sleep disturbances: Improved  13) LGA: Growth scan today EFW 98 % for 36 weeks. BPD,HC,AC all>99%.  Passed GCT and 3 hour GTT, will complete serial growth scans and discuss method of delivery as appropriate. Today we discussed that if she were to go into spontaneous labor, unlikely contraindication to vaginal delivery, however would not allow for prolonged second stage or offer operative vaginal delivery.  Patient very clear that does not want to have to worry about shoulder dystocia and wants safest route of delivery for baby.  We discussed repeat growth scan in 3 weeks.  If continued concerns at that time for LGA, could consider moving forward with  delivery if she desires.  Patient believes she will want to do this as feels this would limit concerns for possible complication.  Will schedule on 18 at 39w4d with Liam as requested by patient and proceed if she continues to desire this after growth US on 18.  14) GERD: Taking Ranitidine and improved  15) Labor: Epidural or Spinal if CS/Breastfeed/Mirena vs Condoms  16) RTC in 1 week for EVELYN visit, GBS collected today, labor precautions discussed    Agnes Wheeler MD

## 2018-08-06 NOTE — LETTER
2018       RE: Ruma Vega  [address]  [place] [address]     Dear Colleague,    Thank you for referring your patient, Ruma Vega, to the [facility] at [facility]. Please see a copy of my visit note below.    EVELYN Visit Note 18    S: Doing ok, occasional ctx but nothing regular or painful.  Denies VB or LOF.  + FM.  Denies HA, scotoma, SOB, RUQ pain.  Has had some more swelling in hands and feet but improved with rest, still wearing rings.  Biggest concern is of size of baby, wants safest delivery possible for baby.    O:  See OB Flowsheet    A/P: 39 yo  @ 36w2d by early US presents for EVELYN visit  1) Prenatal care: Rh positive, Tiffany negative, Rubella immune, Remainder NOB labs wnl.  Normal GCT.  Given LGA did complete 3 hour GTT and passed all values.  2) AMA/Genetic screening: Normal NIPT and Innatal microdeletion testing, AFP normal, Level II US with suboptimal view of heart/profilee, Having a BOY.  Repeat US within normal although persistent right umbilical vein.  Due to this a fetal echo was completed and was normal.   3) Borderline BP: Checking BP at home.  Call with persistent BP > 140/90 and come in for evaluation with BP > 160/110.  Patient understands this recommendation.  Did get baseline preeclampsia labs with slight elevation of ALT.  Was persistently elevated and RUQ US completed without evidence of PADILLA.  Plan per MFM at that time was to repeat serial labs every visit and this has sense resolved.  Plan to re-check with symptoms or change in BP.   4) Screening for malignant neoplasm of cervix: History of LSIL/CHRIS-1 in .  Had NILM pap in  and NILM/HPV negative pap in 2015, not due until 2020.  5) Factor V Leiden heterozygote: Patient seen by Dr. Smart 18.  Had negative APS testing.  Is on baby ASA currently.  No personal history of clot.  Plan for 6 weeks Lovenox 40 mg daily postpartum and  start within 12-24 hours of delivery if able.    6) History of kidney donation: Given to her father in  without altered kidney function.  Had normal baseline creatinine and Urine Pr:Cr.  Creatinine is stable.  7) Vitamin D deficiency: On supplementation, improved at EOB visit  8) Dishydrotic eczema: Uses mometasone medium potency cream with flares.  Discussed low likelihood of any effect with intermittent application.  9) s/p flu shot, Tdap  10) Right thigh numbness: Occasional episodes with activity, resolves with rest and no issues with weight baring, all sensory in nature.  Continue to monitor and call with monitor dysfunction or spreading of paresthesia area.  11) Bilateral hand discomfort: Improved  12) Sleep disturbances: Improved  13) LGA: Growth scan today EFW 98 % for 36 weeks. BPD,HC,AC all>99%.  Passed GCT and 3 hour GTT, will complete serial growth scans and discuss method of delivery as appropriate. Today we discussed that if she were to go into spontaneous labor, unlikely contraindication to vaginal delivery, however would not allow for prolonged second stage or offer operative vaginal delivery.  Patient very clear that does not want to have to worry about shoulder dystocia and wants safest route of delivery for baby.  We discussed repeat growth scan in 3 weeks.  If continued concerns at that time for LGA, could consider moving forward with  delivery if she desires.  Patient believes she will want to do this as feels this would limit concerns for possible complication.  Will schedule on 18 at 39w4d with Liam as requested by patient and proceed if she continues to desire this after growth US on 18.  14) GERD: Taking Ranitidine and improved  15) Labor: Epidural or Spinal if CS/Breastfeed/Mirena vs Condoms  16) RTC in 1 week for EVELYN visit, GBS collected today, labor precautions discussed    Agnes Wheeler MD

## 2018-08-07 LAB
GP B STREP DNA SPEC QL NAA+PROBE: NEGATIVE
SPECIMEN SOURCE: NORMAL

## 2018-08-09 ENCOUNTER — TELEPHONE (OUTPATIENT)
Dept: MAMMOGRAPHY | Facility: CLINIC | Age: 38
End: 2018-08-09

## 2018-08-13 ENCOUNTER — OFFICE VISIT (OUTPATIENT)
Dept: OBGYN | Facility: CLINIC | Age: 38
End: 2018-08-13
Attending: OBSTETRICS & GYNECOLOGY
Payer: COMMERCIAL

## 2018-08-13 VITALS
BODY MASS INDEX: 39.28 KG/M2 | DIASTOLIC BLOOD PRESSURE: 86 MMHG | HEART RATE: 84 BPM | SYSTOLIC BLOOD PRESSURE: 130 MMHG | HEIGHT: 69 IN | WEIGHT: 265.2 LBS

## 2018-08-13 DIAGNOSIS — D68.51 FACTOR V LEIDEN (H): ICD-10-CM

## 2018-08-13 DIAGNOSIS — O36.63X0 EXCESSIVE FETAL GROWTH AFFECTING MANAGEMENT OF PREGNANCY IN THIRD TRIMESTER, SINGLE OR UNSPECIFIED FETUS: ICD-10-CM

## 2018-08-13 DIAGNOSIS — Z52.4 KIDNEY DONOR: ICD-10-CM

## 2018-08-13 DIAGNOSIS — O09.90 HIGH RISK PREGNANCY, ANTEPARTUM: Primary | ICD-10-CM

## 2018-08-13 DIAGNOSIS — O09.523 ELDERLY MULTIGRAVIDA IN THIRD TRIMESTER: ICD-10-CM

## 2018-08-13 NOTE — PROGRESS NOTES
EVELYN Visit 18    S: Doing well.  Notices more pelvic discomfort when more active but resolves with rest.  No ctx, VB or LOF.  + FM.  Denies HA, scotoma, SOB, RUQ pain.  Some swelling in feet.  Wondering when to DC aspirin.    O:  See OB Flowsheet    A/P: 37 yo  @ 37w2d by early US presents for EVELYN visit  1) Prenatal care: Rh positive, Tiffany negative, Rubella immune, Remainder NOB labs wnl.  Normal GCT.  Given LGA did complete 3 hour GTT and passed all values.  2) AMA/Genetic screening: Normal NIPT and Innatal microdeletion testing, AFP normal, Level II US with suboptimal view of heart/profilee, Having a BOY.  Repeat US within normal although persistent right umbilical vein.  Due to this a fetal echo was completed and was normal.   3) Borderline BP: Checking BP at home.  Call with persistent BP > 140/90 and come in for evaluation with BP > 160/110.  Patient understands this recommendation.  Did get baseline preeclampsia labs with slight elevation of ALT.  Was persistently elevated and RUQ US completed without evidence of PADILLA.  Plan per MFM at that time was to repeat serial labs every visit and this has sense resolved.  Plan to re-check with symptoms or change in BP.  Plan to DC aspirin today as term and planning likely CS in 2 weeks.  4) Screening for malignant neoplasm of cervix: History of LSIL/CHRIS-1 in .  Had NILM pap in  and NILM/HPV negative pap in 2015, not due until 2020.  5) Factor V Leiden heterozygote: Patient seen by Dr. Smart 18.  Had negative APS testing.  Is on baby ASA currently.  No personal history of clot.  Plan for 6 weeks Lovenox 40 mg daily postpartum and start within 12-24 hours of delivery if able.    6) History of kidney donation: Given to her father in  without altered kidney function.  Had normal baseline creatinine and Urine Pr:Cr.  Creatinine is stable.  7) Vitamin D deficiency: On supplementation, improved at EOB visit  8) Dishydrotic eczema: Uses mometasone  medium potency cream with flares.  Discussed low likelihood of any effect with intermittent application.  9) s/p flu shot, Tdap  10) Right thigh numbness: Occasional episodes with activity, resolves with rest and no issues with weight baring, all sensory in nature.  Continue to monitor and call with monitor dysfunction or spreading of paresthesia area.  11) Bilateral hand discomfort: Improved  12) Sleep disturbances: Improved  13) LGA: Growth scan 8/6/18 EFW 98 % for 36 weeks. BPD,HC,AC all>99%.  Passed GCT and 3 hour GTT, will complete serial growth scans and discuss method of delivery as appropriate. Today we discussed that if she were to go into spontaneous labor, no contraindication to vaginal delivery, however would not allow for prolonged second stage or offer operative vaginal delivery.  Patient very clear that does not want to have to worry about shoulder dystocia and wants safest route of delivery for baby given prior issues with losses.  Plan repeat growth scan in 3 weeks.  If continued concerns at that time for LGA, has PCS scheduled 8/29/18 at 39w4d with Liam as requested by patient and proceed if she continues to desire this after growth US on 8/27/18.  14) GERD: Taking Ranitidine and improved  15) Labor: Epidural or Spinal if CS/Breastfeed/Mirena vs Condoms  16) GBS negative  17) RTC in 1 week for EVELYN visit,  labor precautions discussed    Agnes Wheeler MD

## 2018-08-13 NOTE — MR AVS SNAPSHOT
After Visit Summary   8/13/2018    Ruma Vega    MRN: [id number]           Patient Information     Date Of Birth          1980        Visit Information        Provider Department      8/13/2018 2:15 PM Agnes Wheeler MD [facility]        Today's Diagnoses     High risk pregnancy, antepartum    -  1    Excessive fetal growth affecting management of pregnancy in third trimester, single or unspecified fetus        Elderly multigravida in third trimester        Factor V Leiden (H)        Kidney donor           Follow-ups after your visit        Follow-up notes from your care team     Return in about 1 week (around 8/20/2018) for EVELYN Visit.      Your next 10 appointments already scheduled     Aug 20, 2018  4:00 PM CDT   RETURN OB with Agnes Wheeler MD   [facility] ([facility])    [facility] [address]  [address]  [address] [facility] [id number] [phone]            Aug 27, 2018  3:30 PM CDT   (Arrive by 3:15 PM)   US OB SINGLE FOLLOW UP REPEAT with [id number]   [facility] ([facility])    [facility]  3rd Floor, [address]  [address] [place] [address] [phone]           Please bring a list of your medicines (including vitamins, minerals and over-the-counter drugs). Also, tell your doctor about any allergies you may have. Wear comfortable clothes and leave your valuables at home.  Drink four 8-ounce glasses of fluid an hour before your exam. If you need to empty your bladder before your exam, try to release only a little urine. Then, drink another glass of fluid.  You may have up to two family members in the exam room. If you bring a small child, an adult must be there to care for him or her. No video or camera photography during the procedure.  Please call the Imaging Department at your exam site with any questions.             "Aug 27, 2018  4:00 PM CDT   RETURN OB with Agnes Wheeler MD   [facility] ([facility])    [facility] [address]  [address]  [address]  [facility] [id number] [phone]            Aug 29, 2018   Procedure with Agnes Wheeler MD   [facility] (--)    [address] [facility] [address]   [phone]            Sep 05, 2018 11:30 AM CDT   RETURN OB with Agnes Wheeler MD   [facility] ([facility])    [facility] [address]  [address]  [address]  [facility] [id number] [phone]              Who to contact     Please call your clinic at [phone] to:    Ask questions about your health    Make or cancel appointments    Discuss your medicines    Learn about your test results    Speak to your doctor            Additional Information About Your Visit        iloho Information     iloho gives you secure access to your electronic health record. If you see a primary care provider, you can also send messages to your care team and make appointments. If you have questions, please call your primary care clinic.  If you do not have a primary care provider, please call [phone] and they will assist you.      iloho is an electronic gateway that provides easy, online access to your medical records. With iloho, you can request a clinic appointment, read your test results, renew a prescription or communicate with your care team.     To access your existing account, please contact your [facility] or call [phone] for assistance.        Care EveryWhere ID     This is your Care EveryWhere ID. This could be used by other organizations to access your [place] medical records  [id number]        Your Vitals Were     Pulse Height BMI (Body Mass Index)             84 1.753 m (5' 9\") 39.16 kg/m2          Blood Pressure from Last 3 Encounters:   08/13/18 130/86   08/06/18 " 123/86   07/23/18 123/85    Weight from Last 3 Encounters:   08/13/18 120.3 kg (265 lb 3.2 oz)   08/06/18 118.5 kg (261 lb 4.8 oz)   07/23/18 118.8 kg (261 lb 12.8 oz)              Today, you had the following     No orders found for display       Primary Care Provider Office Phone # Fax #    Shannon Jerri Holder [phone]       [facility] [address]  [facility] [address]        Equal Access to Services     [facility]: Hadii cindy chaves hadasho Sorobert, waaxda luqadaha, qaybta kaalmada carlos, luba shoemaker . So [facility] [phone].    ATENCIÓN: Si habla español, tiene a figueroa disposición servicios gratuitos de asistencia lingüística. [facility] [phone].    We comply with applicable federal civil rights laws and [place] laws. We do not discriminate on the basis of race, color, national origin, age, disability, sex, sexual orientation, or gender identity.            Thank you!     Thank you for choosing [facility]  for your care. Our goal is always to provide you with excellent care. Hearing back from our patients is one way we can continue to improve our services. Please take a few minutes to complete the written survey that you may receive in the mail after your visit with us. Thank you!             Your Updated Medication List - Protect others around you: Learn how to safely use, store and throw away your medicines at www.disposemymeds.org.          This list is accurate as of 8/13/18  3:47 PM.  Always use your most recent med list.                   Brand Name Dispense Instructions for use Diagnosis    aspirin 81 MG tablet      Take by mouth daily        FLONASE 50 MCG/ACT spray   Generic drug:  fluticasone     1 Package    Spray 1-2 sprays into both nostrils daily    Allergic rhinitis       FOLIC ACID PO      Take 800 mcg by mouth daily        ketotifen 0.025 % Soln ophthalmic solution    ZADITOR/REFRESH ANTI-ITCH     Place  1 drop into both eyes 2 times daily        mometasone 0.1 % cream    ELOCON     Apply topically daily        PRENATAL VITAMINS PO      Take  by mouth daily.    Flu vaccine need       psyllium 58.6 % Powd    METAMUCIL     Take by mouth daily        ranitidine 150 MG tablet    ZANTAC     Take 150 mg by mouth 2 times daily        SINUS RINSE BOTTLE KIT NA      Spray 1 packet in nostril as needed        VITAMIN D-3 PO

## 2018-08-13 NOTE — LETTER
2018       RE: Ruma Vega  [address] [place] [address]     Dear Colleague,    Thank you for referring your patient, Ruma Vega, to the [facility] at [facility]. Please see a copy of my visit note below.    EVELYN Visit 18    S: Doing well.  Notices more pelvic discomfort when more active but resolves with rest.  No ctx, VB or LOF.  + FM.  Denies HA, scotoma, SOB, RUQ pain.  Some swelling in feet.  Wondering when to DC aspirin.    O:  See OB Flowsheet    A/P: 37 yo  @ 37w2d by early US presents for EVELYN visit  1) Prenatal care: Rh positive, Tiffany negative, Rubella immune, Remainder NOB labs wnl.  Normal GCT.  Given LGA did complete 3 hour GTT and passed all values.  2) AMA/Genetic screening: Normal NIPT and Innatal microdeletion testing, AFP normal, Level II US with suboptimal view of heart/profilee, Having a BOY.  Repeat US within normal although persistent right umbilical vein.  Due to this a fetal echo was completed and was normal.   3) Borderline BP: Checking BP at home.  Call with persistent BP > 140/90 and come in for evaluation with BP > 160/110.  Patient understands this recommendation.  Did get baseline preeclampsia labs with slight elevation of ALT.  Was persistently elevated and RUQ US completed without evidence of PADILLA.  Plan per MFM at that time was to repeat serial labs every visit and this has sense resolved.  Plan to re-check with symptoms or change in BP.  Plan to DC aspirin today as term and planning likely CS in 2 weeks.  4) Screening for malignant neoplasm of cervix: History of LSIL/CHRIS-1 in .  Had NILM pap in  and NILM/HPV negative pap in 2015, not due until 2020.  5) Factor V Leiden heterozygote: Patient seen by Dr. Smart 18.  Had negative APS testing.  Is on baby ASA currently.  No personal history of clot.  Plan for 6 weeks Lovenox 40 mg daily postpartum and start within 12-24  hours of delivery if able.    6) History of kidney donation: Given to her father in 2010 without altered kidney function.  Had normal baseline creatinine and Urine Pr:Cr.  Creatinine is stable.  7) Vitamin D deficiency: On supplementation, improved at EOB visit  8) Dishydrotic eczema: Uses mometasone medium potency cream with flares.  Discussed low likelihood of any effect with intermittent application.  9) s/p flu shot, Tdap  10) Right thigh numbness: Occasional episodes with activity, resolves with rest and no issues with weight baring, all sensory in nature.  Continue to monitor and call with monitor dysfunction or spreading of paresthesia area.  11) Bilateral hand discomfort: Improved  12) Sleep disturbances: Improved  13) LGA: Growth scan 8/6/18 EFW 98 % for 36 weeks. BPD,HC,AC all>99%.  Passed GCT and 3 hour GTT, will complete serial growth scans and discuss method of delivery as appropriate. Today we discussed that if she were to go into spontaneous labor, no contraindication to vaginal delivery, however would not allow for prolonged second stage or offer operative vaginal delivery.  Patient very clear that does not want to have to worry about shoulder dystocia and wants safest route of delivery for baby given prior issues with losses.   Plan repeat growth scan in 3 weeks.  If continued concerns at that time for LGA, has PCS scheduled 8/29/18 at 39w4d with Liam as requested by patient and proceed if she continues to desire this after growth US on 8/27/18.  14) GERD: Taking Ranitidine and improved  15) Labor: Epidural or Spinal if CS/Breastfeed/Mirena vs Condoms  16) GBS negative  17) RTC in 1 week for EVELYN visit,  labor precautions discussed      Again, thank you for allowing me to participate in the care of your patient.      Sincerely,    Agnes Wheeler MD

## 2018-08-20 ENCOUNTER — OFFICE VISIT (OUTPATIENT)
Dept: OBGYN | Facility: CLINIC | Age: 38
End: 2018-08-20
Attending: OBSTETRICS & GYNECOLOGY
Payer: COMMERCIAL

## 2018-08-20 VITALS
HEART RATE: 84 BPM | DIASTOLIC BLOOD PRESSURE: 88 MMHG | SYSTOLIC BLOOD PRESSURE: 139 MMHG | HEIGHT: 69 IN | BODY MASS INDEX: 39.68 KG/M2 | WEIGHT: 267.9 LBS

## 2018-08-20 DIAGNOSIS — R03.0 ELEVATED BLOOD PRESSURE READING WITHOUT DIAGNOSIS OF HYPERTENSION: ICD-10-CM

## 2018-08-20 DIAGNOSIS — O36.63X0 EXCESSIVE FETAL GROWTH AFFECTING MANAGEMENT OF PREGNANCY IN THIRD TRIMESTER, SINGLE OR UNSPECIFIED FETUS: ICD-10-CM

## 2018-08-20 DIAGNOSIS — O09.90 HIGH RISK PREGNANCY, ANTEPARTUM: Primary | ICD-10-CM

## 2018-08-20 LAB
ABO + RH BLD: NORMAL
ABO + RH BLD: NORMAL
ALT SERPL W P-5'-P-CCNC: 18 U/L (ref 0–50)
AST SERPL W P-5'-P-CCNC: 15 U/L (ref 0–45)
BLD GP AB SCN SERPL QL: NORMAL
BLOOD BANK CMNT PATIENT-IMP: NORMAL
CREAT SERPL-MCNC: 1.08 MG/DL (ref 0.52–1.04)
CREAT UR-MCNC: 121 MG/DL
ERYTHROCYTE [DISTWIDTH] IN BLOOD BY AUTOMATED COUNT: 13.5 % (ref 10–15)
GFR SERPL CREATININE-BSD FRML MDRD: 56 ML/MIN/1.7M2
HCT VFR BLD AUTO: 34.2 % (ref 35–47)
HGB BLD-MCNC: 11.6 G/DL (ref 11.7–15.7)
MCH RBC QN AUTO: 30.3 PG (ref 26.5–33)
MCHC RBC AUTO-ENTMCNC: 33.9 G/DL (ref 31.5–36.5)
MCV RBC AUTO: 89 FL (ref 78–100)
PLATELET # BLD AUTO: 318 10E9/L (ref 150–450)
PROT UR-MCNC: 0.28 G/L
PROT/CREAT 24H UR: 0.23 G/G CR (ref 0–0.2)
RBC # BLD AUTO: 3.83 10E12/L (ref 3.8–5.2)
SPECIMEN EXP DATE BLD: NORMAL
WBC # BLD AUTO: 12.6 10E9/L (ref 4–11)

## 2018-08-20 PROCEDURE — 86900 BLOOD TYPING SEROLOGIC ABO: CPT | Performed by: OBSTETRICS & GYNECOLOGY

## 2018-08-20 PROCEDURE — 86901 BLOOD TYPING SEROLOGIC RH(D): CPT | Performed by: OBSTETRICS & GYNECOLOGY

## 2018-08-20 PROCEDURE — 82565 ASSAY OF CREATININE: CPT | Performed by: OBSTETRICS & GYNECOLOGY

## 2018-08-20 PROCEDURE — 84450 TRANSFERASE (AST) (SGOT): CPT | Performed by: OBSTETRICS & GYNECOLOGY

## 2018-08-20 PROCEDURE — 86850 RBC ANTIBODY SCREEN: CPT | Performed by: OBSTETRICS & GYNECOLOGY

## 2018-08-20 PROCEDURE — 84156 ASSAY OF PROTEIN URINE: CPT | Performed by: OBSTETRICS & GYNECOLOGY

## 2018-08-20 PROCEDURE — 84460 ALANINE AMINO (ALT) (SGPT): CPT | Performed by: OBSTETRICS & GYNECOLOGY

## 2018-08-20 PROCEDURE — 86780 TREPONEMA PALLIDUM: CPT | Performed by: OBSTETRICS & GYNECOLOGY

## 2018-08-20 PROCEDURE — 36415 COLL VENOUS BLD VENIPUNCTURE: CPT | Performed by: OBSTETRICS & GYNECOLOGY

## 2018-08-20 PROCEDURE — G0463 HOSPITAL OUTPT CLINIC VISIT: HCPCS

## 2018-08-20 PROCEDURE — 85027 COMPLETE CBC AUTOMATED: CPT | Performed by: OBSTETRICS & GYNECOLOGY

## 2018-08-20 NOTE — MR AVS SNAPSHOT
After Visit Summary   8/20/2018    Ruma Vega    MRN: [id number]           Patient Information     Date Of Birth          1980        Visit Information        Provider Department      8/20/2018 4:00 PM Agnes Wheeler MD [facility]        Today's Diagnoses     High risk pregnancy, antepartum    -  1    Excessive fetal growth affecting management of pregnancy in third trimester, single or unspecified fetus        Elevated blood pressure reading without diagnosis of hypertension           Follow-ups after your visit        Your next 10 appointments already scheduled     Aug 21, 2018  8:30 AM CDT   Nurse Visit with [facility] Nurse   [facility] ([facility])    [facility] [address]  [address]rd Flr,[address]  [address] [place], [place] [address]e [facility] [address] [phone]            Aug 27, 2018  3:30 PM CDT   (Arrive by 3:15 PM)   US OB SINGLE FOLLOW UP REPEAT with [facility]   [facility] ([facility])    [facility]  3rd Floor, [address]  [address] [place] [address] [phone]           Please bring a list of your medicines (including vitamins, minerals and over-the-counter drugs). Also, tell your doctor about any allergies you may have. Wear comfortable clothes and leave your valuables at home.  Drink four 8-ounce glasses of fluid an hour before your exam. If you need to empty your bladder before your exam, try to release only a little urine. Then, drink another glass of fluid.  You may have up to two family members in the exam room. If you bring a small child, an adult must be there to care for him or her. No video or camera photography during the procedure.  Please call the Imaging Department at your exam site with any questions.            Aug 27, 2018  4:00 PM CDT   RETURN OB with Agnes Wheeler MD   [facility] ([facility])    [place] [address]  [address]  [address]  [facility] [address] [phone]            Aug 29, 2018   Procedure with Agnes Wheelre MD   [facility] (--)    [address]  [facility] [address]   [phone]            Sep 05, 2018 11:30 AM CDT   RETURN OB with Agnes Wheeler MD   [facility] ([facility])    [facility] [place] [facility] [address]  [address]  [address]  [facility] [address] [phone]              Who to contact     Please call your clinic at [phone] to:    Ask questions about your health    Make or cancel appointments    Discuss your medicines    Learn about your test results    Speak to your doctor            Additional Information About Your Visit        [employer] Information     [employer] gives you secure access to your electronic health record. If you see a primary care provider, you can also send messages to your care team and make appointments. If you have questions, please call your primary care clinic.  If you do not have a primary care provider, please call [phone] and they will assist you.      [employer] is an electronic gateway that provides easy, online access to your medical records. With [employer], you can request a clinic appointment, read your test results, renew a prescription or communicate with your care team.     To access your existing account, please contact your [facility] or call [phone] for assistance.        Care EveryWhere ID     This is your Care EveryWhere ID. This could be used by other organizations to access your [place] [facility] medical records  [id number]        Your Vitals Were     Pulse Height BMI (Body Mass Index)             84 1.753 m (5' 9\") 39.56 kg/m2          Blood Pressure from Last 3 Encounters:   08/20/18 139/88   08/13/18 130/86   08/06/18 123/86    Weight from Last 3 Encounters:   08/20/18 121.5 kg (267 lb 14.4 oz)   08/13/18 120.3 kg (265 lb 3.2 oz) "   08/06/18 118.5 kg (261 lb 4.8 oz)              We Performed the Following     ABO/Rh Type and Screen     ALT     AST     CBC with Platelets     Creatinine urine calculation only     Creatinine     Protein  random urine with Creat Ratio     Treponema Abs w Reflex to RPR and Titer        Primary Care Provider Office Phone # Fax #    Shannon Jerri Holder [phone]       [facility] [address]  [facility] [address]        Equal Access to Services     [facility]: Hadii aad ku hadasho Soomaali, waaxda luqadaha, qaybta kaalmada adeegyada, waxay idiin hayaan adeeg kharakenna la'aan . So [facility] [phone].    ATENCIÓN: Si mike miller, tiene a figueroa disposición servicios gratuitos de asistencia lingüística. [facility] [phone].    We comply with applicable federal civil rights laws and [place] laws. We do not discriminate on the basis of race, color, national origin, age, disability, sex, sexual orientation, or gender identity.            Thank you!     Thank you for choosing [facility]  for your care. Our goal is always to provide you with excellent care. Hearing back from our patients is one way we can continue to improve our services. Please take a few minutes to complete the written survey that you may receive in the mail after your visit with us. Thank you!             Your Updated Medication List - Protect others around you: Learn how to safely use, store and throw away your medicines at www.disposemymeds.org.          This list is accurate as of 8/20/18  5:34 PM.  Always use your most recent med list.                   Brand Name Dispense Instructions for use Diagnosis    aspirin 81 MG tablet      Take by mouth daily        FLONASE 50 MCG/ACT spray   Generic drug:  fluticasone     1 Package    Spray 1-2 sprays into both nostrils daily    Allergic rhinitis       FOLIC ACID PO      Take 800 mcg by mouth daily        ketotifen 0.025 % Soln ophthalmic  solution    ZADITOR/REFRESH ANTI-ITCH     Place 1 drop into both eyes 2 times daily        mometasone 0.1 % cream    ELOCON     Apply topically daily        PRENATAL VITAMINS PO      Take  by mouth daily.    Flu vaccine need       psyllium 58.6 % Powd    METAMUCIL     Take by mouth daily        ranitidine 150 MG tablet    ZANTAC     Take 150 mg by mouth 2 times daily        SINUS RINSE BOTTLE KIT NA      Spray 1 packet in nostril as needed        VITAMIN D-3 PO

## 2018-08-20 NOTE — PROGRESS NOTES
EVELYN Visit 18    S: Doing ok.  Had more MSK right sided back pain worse with activity yesterday so wasn't feeling great with that.  Continues to have pelvic stretching and pressure but no contractions.  Denies VB or LOF.  + FM.  Denies HA, scotoma, SOB, RUQ pain or increased swelling in her extremities.    O:  See OB Flowsheet    Initial BP: 142/95  Average BP with BP protocol: 139/88    A/P: 37 yo  @ 38w2d by early US presents for EVELYN visit  1) Prenatal care: Rh positive, Tiffany negative, Rubella immune, Remainder NOB labs wnl.  Normal GCT.  Given LGA did complete 3 hour GTT and passed all values.  2) AMA/Genetic screening: Normal NIPT and Innatal microdeletion testing, AFP normal, Level II US with suboptimal view of heart/profilee, Having a BOY.  Repeat US within normal although persistent right umbilical vein.  Due to this a fetal echo was completed and was normal.   3) Elevated BP: Has had borderline BP in past but has been normtensive in pregnancy, was on aspirin until 37 weeks gestation.  Elevated BP > 140/90 on arrival today.  Asymptomatic.  Plan for repeat HELLP labs today.  Plan for repeat BP tomorrow in clinic.  If > 140/90 given 38-39 weeks, per NICHD and SMFM recommendations will proceed with delivery.  Patient does desire to move forward with  delivery to ensure safe delivery of baby, will come in NPO tomorrow.   4) Screening for malignant neoplasm of cervix: History of LSIL/CHRIS-1 in .  Had NILM pap in  and NILM/HPV negative pap in 2015, not due until 2020.  5) Factor V Leiden heterozygote: Patient seen by Dr. Smart 18.  Had negative APS testing.  Was on ASA until 37 weeks.  No personal history of clot.  Plan for 6 weeks Lovenox 40 mg daily postpartum and start within 12-24 hours of delivery if able.    6) History of kidney donation: Given to her father in  without altered kidney function.  Had normal baseline creatinine and Urine Pr:Cr.  Creatinine is stable.  7)  Vitamin D deficiency: On supplementation, improved at EOB visit  8) Dishydrotic eczema: Uses mometasone medium potency cream with flares.  Discussed low likelihood of any effect with intermittent application.  9) s/p flu shot, Tdap  10) Right thigh numbness: Occasional episodes with activity, resolves with rest and no issues with weight baring, all sensory in nature.  Continue to monitor and call with monitor dysfunction or spreading of paresthesia area.  11) Bilateral hand discomfort: Improved  12) Sleep disturbances: Improved  13) LGA: Growth scan 8/6/18 EFW 98% for 36 weeks. BPD,HC,AC all>99%.  Passed GCT and 3 hour GTT, will complete serial growth scans and discuss method of delivery as appropriate. Plan repeat growth scan in 3 weeks if still pregnant at that time  14) GERD: Taking Ranitidine and improved  15) Labor: Epidural or Spinal if CS/Breastfeed/Mirena vs Condoms  16) GBS negative  17) RTC tomorrow for BP check, page Liam with results, will come in NPO, on L&D schedule for possible CS at 10:30 with [place] if appropriate

## 2018-08-20 NOTE — LETTER
2018       RE: Ruma Vega  [address]  [place] [address]     Dear Colleague,    Thank you for referring your patient, Ruma Vega, to the [facility] at [facility]. Please see a copy of my visit note below.    EVELYN Visit 18    S: Doing ok.  Had more MSK right sided back pain worse with activity yesterday so wasn't feeling great with that.  Continues to have pelvic stretching and pressure but no contractions.  Denies VB or LOF.  + FM.  Denies HA, scotoma, SOB, RUQ pain or increased swelling in her extremities.    O:  See OB Flowsheet    Initial BP: 142/95  Average BP with BP protocol: 139/88    A/P: 37 yo  @ 38w2d by early US presents for EVELYN visit  1) Prenatal care: Rh positive, Tiffany negative, Rubella immune, Remainder NOB labs wnl.  Normal GCT.  Given LGA did complete 3 hour GTT and passed all values.  2) AMA/Genetic screening: Normal NIPT and Innatal microdeletion testing, AFP normal, Level II US with suboptimal view of heart/profilee, Having a BOY.  Repeat US within normal although persistent right umbilical vein.  Due to this a fetal echo was completed and was normal.   3) Elevated BP: Has had borderline BP in past but has been normtensive in pregnancy, was on aspirin until 37 weeks gestation.  Elevated BP > 140/90 on arrival today.  Asymptomatic.  Plan for repeat HELLP labs today.  Plan for repeat BP tomorrow in clinic.  If > 140/90 given 38-39 weeks, per NICHD and SMFM recommendations will proceed with delivery.  Patient does desire to move forward with  delivery to ensure safe delivery of baby, will come in NPO tomorrow.   4) Screening for malignant neoplasm of cervix: History of LSIL/CHRIS-1 in .  Had NILM pap in  and NILM/HPV negative pap in 2015, not due until 2020.  5) Factor V Leiden heterozygote: Patient seen by Dr. Smart 18.  Had negative APS testing.  Was on ASA until 37 weeks.   No personal history of clot.  Plan for 6 weeks Lovenox 40 mg daily postpartum and start within 12-24 hours of delivery if able.    6) History of kidney donation: Given to her father in 2010 without altered kidney function.  Had normal baseline creatinine and Urine Pr:Cr.  Creatinine is stable.  7) Vitamin D deficiency: On supplementation, improved at EOB visit  8) Dishydrotic eczema: Uses mometasone medium potency cream with flares.  Discussed low likelihood of any effect with intermittent application.  9) s/p flu shot, Tdap  10) Right thigh numbness: Occasional episodes with activity, resolves with rest and no issues with weight baring, all sensory in nature.  Continue to monitor and call with monitor dysfunction or spreading of paresthesia area.  11) Bilateral hand discomfort: Improved  12) Sleep disturbances: Improved  13) LGA: Growth scan 8/6/18 EFW 98% for 36 weeks. BPD,HC,AC all>99%.  Passed GCT and 3 hour GTT, will complete serial growth scans and discuss method of delivery as appropriate. Plan repeat growth scan in 3 weeks if still pregnant at that time  14) GERD: Taking Ranitidine and improved  15) Labor: Epidural or Spinal if CS/Breastfeed/Mirena vs Condoms  16) GBS negative  17) RTC tomorrow for BP check, page Liam with results, will come in NPO, on L&D schedule for possible CS at 10:30 with Liam if appropriate    Again, thank you for allowing me to participate in the care of your patient.      Sincerely,    Agnes Wheeler MD

## 2018-08-21 ENCOUNTER — ANESTHESIA (OUTPATIENT)
Dept: OBGYN | Facility: CLINIC | Age: 38
End: 2018-08-21
Payer: COMMERCIAL

## 2018-08-21 ENCOUNTER — SURGERY (OUTPATIENT)
Age: 38
End: 2018-08-21

## 2018-08-21 ENCOUNTER — ALLIED HEALTH/NURSE VISIT (OUTPATIENT)
Dept: OBGYN | Facility: CLINIC | Age: 38
End: 2018-08-21
Attending: OBSTETRICS & GYNECOLOGY
Payer: COMMERCIAL

## 2018-08-21 ENCOUNTER — HOSPITAL ENCOUNTER (INPATIENT)
Facility: CLINIC | Age: 38
LOS: 3 days | Discharge: HOME | End: 2018-08-24
Attending: OBSTETRICS & GYNECOLOGY | Admitting: OBSTETRICS & GYNECOLOGY
Payer: COMMERCIAL

## 2018-08-21 ENCOUNTER — ANESTHESIA EVENT (OUTPATIENT)
Dept: OBGYN | Facility: CLINIC | Age: 38
End: 2018-08-21
Payer: COMMERCIAL

## 2018-08-21 VITALS — SYSTOLIC BLOOD PRESSURE: 130 MMHG | DIASTOLIC BLOOD PRESSURE: 86 MMHG | HEART RATE: 101 BPM

## 2018-08-21 DIAGNOSIS — D68.51 FACTOR V LEIDEN (H): ICD-10-CM

## 2018-08-21 DIAGNOSIS — O09.90 HIGH RISK PREGNANCY, ANTEPARTUM: Primary | ICD-10-CM

## 2018-08-21 DIAGNOSIS — D62 ANEMIA DUE TO BLOOD LOSS, ACUTE: ICD-10-CM

## 2018-08-21 DIAGNOSIS — Z98.891 S/P CESAREAN SECTION: Primary | ICD-10-CM

## 2018-08-21 PROBLEM — O13.9 GESTATIONAL HYPERTENSION: Status: ACTIVE | Noted: 2018-08-21

## 2018-08-21 LAB
HGB BLD-MCNC: 9.8 G/DL (ref 11.7–15.7)
T PALLIDUM AB SER QL: NONREACTIVE

## 2018-08-21 PROCEDURE — 25000125 ZZHC RX 250: Performed by: STUDENT IN AN ORGANIZED HEALTH CARE EDUCATION/TRAINING PROGRAM

## 2018-08-21 PROCEDURE — 71000015 ZZH RECOVERY PHASE 1 LEVEL 2 EA ADDTL HR: Performed by: OBSTETRICS & GYNECOLOGY

## 2018-08-21 PROCEDURE — 71000014 ZZH RECOVERY PHASE 1 LEVEL 2 FIRST HR: Performed by: OBSTETRICS & GYNECOLOGY

## 2018-08-21 PROCEDURE — 25000128 H RX IP 250 OP 636

## 2018-08-21 PROCEDURE — 37000008 ZZH ANESTHESIA TECHNICAL FEE, 1ST 30 MIN: Performed by: OBSTETRICS & GYNECOLOGY

## 2018-08-21 PROCEDURE — 40000269 ZZH STATISTIC NO CHARGE FACILITY FEE: Mod: ZF

## 2018-08-21 PROCEDURE — 40000170 ZZH STATISTIC PRE-PROCEDURE ASSESSMENT II: Performed by: OBSTETRICS & GYNECOLOGY

## 2018-08-21 PROCEDURE — 88307 TISSUE EXAM BY PATHOLOGIST: CPT | Performed by: OBSTETRICS & GYNECOLOGY

## 2018-08-21 PROCEDURE — 37000009 ZZH ANESTHESIA TECHNICAL FEE, EACH ADDTL 15 MIN: Performed by: OBSTETRICS & GYNECOLOGY

## 2018-08-21 PROCEDURE — 25000125 ZZHC RX 250

## 2018-08-21 PROCEDURE — 25000128 H RX IP 250 OP 636: Performed by: STUDENT IN AN ORGANIZED HEALTH CARE EDUCATION/TRAINING PROGRAM

## 2018-08-21 PROCEDURE — 12000032 ZZH R&B OB CRITICAL UMMC

## 2018-08-21 PROCEDURE — 85018 HEMOGLOBIN: CPT | Performed by: OBSTETRICS & GYNECOLOGY

## 2018-08-21 PROCEDURE — 25000128 H RX IP 250 OP 636: Performed by: OBSTETRICS & GYNECOLOGY

## 2018-08-21 PROCEDURE — C9290 INJ, BUPIVACAINE LIPOSOME: HCPCS

## 2018-08-21 PROCEDURE — 36000057 ZZH SURGERY LEVEL 3 1ST 30 MIN - UMMC: Performed by: OBSTETRICS & GYNECOLOGY

## 2018-08-21 PROCEDURE — 36415 COLL VENOUS BLD VENIPUNCTURE: CPT | Performed by: OBSTETRICS & GYNECOLOGY

## 2018-08-21 PROCEDURE — 27110028 ZZH OR GENERAL SUPPLY NON-STERILE: Performed by: OBSTETRICS & GYNECOLOGY

## 2018-08-21 PROCEDURE — 25000132 ZZH RX MED GY IP 250 OP 250 PS 637: Performed by: STUDENT IN AN ORGANIZED HEALTH CARE EDUCATION/TRAINING PROGRAM

## 2018-08-21 PROCEDURE — 88307 TISSUE EXAM BY PATHOLOGIST: CPT | Mod: 26 | Performed by: OBSTETRICS & GYNECOLOGY

## 2018-08-21 PROCEDURE — 36000059 ZZH SURGERY LEVEL 3 EA 15 ADDTL MIN UMMC: Performed by: OBSTETRICS & GYNECOLOGY

## 2018-08-21 PROCEDURE — 27210794 ZZH OR GENERAL SUPPLY STERILE: Performed by: OBSTETRICS & GYNECOLOGY

## 2018-08-21 RX ORDER — OXYCODONE HYDROCHLORIDE 5 MG/1
5-10 TABLET ORAL EVERY 4 HOURS PRN
Status: DISCONTINUED | OUTPATIENT
Start: 2018-08-21 | End: 2018-08-24 | Stop reason: HOSPADM

## 2018-08-21 RX ORDER — ONDANSETRON 2 MG/ML
INJECTION INTRAMUSCULAR; INTRAVENOUS PRN
Status: DISCONTINUED | OUTPATIENT
Start: 2018-08-21 | End: 2018-08-21

## 2018-08-21 RX ORDER — SIMETHICONE 80 MG
80 TABLET,CHEWABLE ORAL 4 TIMES DAILY PRN
Status: DISCONTINUED | OUTPATIENT
Start: 2018-08-21 | End: 2018-08-23

## 2018-08-21 RX ORDER — SODIUM CHLORIDE, SODIUM LACTATE, POTASSIUM CHLORIDE, CALCIUM CHLORIDE 600; 310; 30; 20 MG/100ML; MG/100ML; MG/100ML; MG/100ML
INJECTION, SOLUTION INTRAVENOUS CONTINUOUS
Status: DISCONTINUED | OUTPATIENT
Start: 2018-08-21 | End: 2018-08-21 | Stop reason: HOSPADM

## 2018-08-21 RX ORDER — NALOXONE HYDROCHLORIDE 0.4 MG/ML
.1-.4 INJECTION, SOLUTION INTRAMUSCULAR; INTRAVENOUS; SUBCUTANEOUS
Status: DISCONTINUED | OUTPATIENT
Start: 2018-08-21 | End: 2018-08-21

## 2018-08-21 RX ORDER — NALOXONE HYDROCHLORIDE 0.4 MG/ML
.1-.4 INJECTION, SOLUTION INTRAMUSCULAR; INTRAVENOUS; SUBCUTANEOUS
Status: DISCONTINUED | OUTPATIENT
Start: 2018-08-21 | End: 2018-08-24 | Stop reason: HOSPADM

## 2018-08-21 RX ORDER — OXYTOCIN/0.9 % SODIUM CHLORIDE 30/500 ML
PLASTIC BAG, INJECTION (ML) INTRAVENOUS
Status: DISCONTINUED
Start: 2018-08-21 | End: 2018-08-21 | Stop reason: HOSPADM

## 2018-08-21 RX ORDER — SODIUM CHLORIDE, SODIUM LACTATE, POTASSIUM CHLORIDE, CALCIUM CHLORIDE 600; 310; 30; 20 MG/100ML; MG/100ML; MG/100ML; MG/100ML
INJECTION, SOLUTION INTRAVENOUS CONTINUOUS PRN
Status: DISCONTINUED | OUTPATIENT
Start: 2018-08-21 | End: 2018-08-21

## 2018-08-21 RX ORDER — CLINDAMYCIN PHOSPHATE 900 MG/50ML
900 INJECTION, SOLUTION INTRAVENOUS
Status: DISCONTINUED | OUTPATIENT
Start: 2018-08-21 | End: 2018-08-21

## 2018-08-21 RX ORDER — ONDANSETRON 2 MG/ML
4 INJECTION INTRAMUSCULAR; INTRAVENOUS EVERY 6 HOURS PRN
Status: DISCONTINUED | OUTPATIENT
Start: 2018-08-21 | End: 2018-08-24 | Stop reason: HOSPADM

## 2018-08-21 RX ORDER — HYDROCORTISONE 2.5 %
CREAM (GRAM) TOPICAL 3 TIMES DAILY PRN
Status: DISCONTINUED | OUTPATIENT
Start: 2018-08-21 | End: 2018-08-24 | Stop reason: HOSPADM

## 2018-08-21 RX ORDER — OXYTOCIN/0.9 % SODIUM CHLORIDE 30/500 ML
PLASTIC BAG, INJECTION (ML) INTRAVENOUS
Status: COMPLETED
Start: 2018-08-21 | End: 2018-08-21

## 2018-08-21 RX ORDER — ACETAMINOPHEN 325 MG/1
975 TABLET ORAL EVERY 8 HOURS
Status: DISCONTINUED | OUTPATIENT
Start: 2018-08-21 | End: 2018-08-24 | Stop reason: HOSPADM

## 2018-08-21 RX ORDER — CITRIC ACID/SODIUM CITRATE 334-500MG
30 SOLUTION, ORAL ORAL
Status: DISCONTINUED | OUTPATIENT
Start: 2018-08-21 | End: 2018-08-21 | Stop reason: HOSPADM

## 2018-08-21 RX ORDER — MORPHINE SULFATE 1 MG/ML
INJECTION, SOLUTION EPIDURAL; INTRATHECAL; INTRAVENOUS PRN
Status: DISCONTINUED | OUTPATIENT
Start: 2018-08-21 | End: 2018-08-21

## 2018-08-21 RX ORDER — LANOLIN 100 %
OINTMENT (GRAM) TOPICAL
Status: DISCONTINUED | OUTPATIENT
Start: 2018-08-21 | End: 2018-08-24 | Stop reason: HOSPADM

## 2018-08-21 RX ORDER — DIPHENHYDRAMINE HYDROCHLORIDE 50 MG/ML
25 INJECTION INTRAMUSCULAR; INTRAVENOUS EVERY 6 HOURS PRN
Status: DISCONTINUED | OUTPATIENT
Start: 2018-08-21 | End: 2018-08-24 | Stop reason: HOSPADM

## 2018-08-21 RX ORDER — NALBUPHINE HYDROCHLORIDE 10 MG/ML
2.5-5 INJECTION, SOLUTION INTRAMUSCULAR; INTRAVENOUS; SUBCUTANEOUS EVERY 6 HOURS PRN
Status: DISCONTINUED | OUTPATIENT
Start: 2018-08-21 | End: 2018-08-21

## 2018-08-21 RX ORDER — ACETAMINOPHEN 325 MG/1
650 TABLET ORAL EVERY 4 HOURS PRN
Status: DISCONTINUED | OUTPATIENT
Start: 2018-08-24 | End: 2018-08-24 | Stop reason: HOSPADM

## 2018-08-21 RX ORDER — OXYTOCIN/0.9 % SODIUM CHLORIDE 30/500 ML
PLASTIC BAG, INJECTION (ML) INTRAVENOUS CONTINUOUS PRN
Status: DISCONTINUED | OUTPATIENT
Start: 2018-08-21 | End: 2018-08-21

## 2018-08-21 RX ORDER — ONDANSETRON 4 MG/1
4 TABLET, ORALLY DISINTEGRATING ORAL EVERY 30 MIN PRN
Status: DISCONTINUED | OUTPATIENT
Start: 2018-08-21 | End: 2018-08-21 | Stop reason: HOSPADM

## 2018-08-21 RX ORDER — OXYTOCIN/0.9 % SODIUM CHLORIDE 30/500 ML
340 PLASTIC BAG, INJECTION (ML) INTRAVENOUS CONTINUOUS PRN
Status: DISCONTINUED | OUTPATIENT
Start: 2018-08-21 | End: 2018-08-24 | Stop reason: HOSPADM

## 2018-08-21 RX ORDER — AMOXICILLIN 250 MG
2 CAPSULE ORAL 2 TIMES DAILY PRN
Status: DISCONTINUED | OUTPATIENT
Start: 2018-08-21 | End: 2018-08-24 | Stop reason: HOSPADM

## 2018-08-21 RX ORDER — BISACODYL 10 MG
10 SUPPOSITORY, RECTAL RECTAL DAILY PRN
Status: DISCONTINUED | OUTPATIENT
Start: 2018-08-23 | End: 2018-08-24 | Stop reason: HOSPADM

## 2018-08-21 RX ORDER — LIDOCAINE 40 MG/G
CREAM TOPICAL
Status: DISCONTINUED | OUTPATIENT
Start: 2018-08-21 | End: 2018-08-21 | Stop reason: HOSPADM

## 2018-08-21 RX ORDER — LIDOCAINE 40 MG/G
CREAM TOPICAL
Status: DISCONTINUED | OUTPATIENT
Start: 2018-08-21 | End: 2018-08-21

## 2018-08-21 RX ORDER — BUPIVACAINE HYDROCHLORIDE AND EPINEPHRINE 2.5; 5 MG/ML; UG/ML
INJECTION, SOLUTION INFILTRATION; PERINEURAL PRN
Status: DISCONTINUED | OUTPATIENT
Start: 2018-08-21 | End: 2018-08-21

## 2018-08-21 RX ORDER — MISOPROSTOL 200 UG/1
400 TABLET ORAL
Status: DISCONTINUED | OUTPATIENT
Start: 2018-08-21 | End: 2018-08-24 | Stop reason: HOSPADM

## 2018-08-21 RX ORDER — SODIUM CHLORIDE, SODIUM LACTATE, POTASSIUM CHLORIDE, CALCIUM CHLORIDE 600; 310; 30; 20 MG/100ML; MG/100ML; MG/100ML; MG/100ML
INJECTION, SOLUTION INTRAVENOUS
Status: COMPLETED
Start: 2018-08-21 | End: 2018-08-21

## 2018-08-21 RX ORDER — MORPHINE SULFATE 1 MG/ML
INJECTION, SOLUTION EPIDURAL; INTRATHECAL; INTRAVENOUS
Status: DISCONTINUED
Start: 2018-08-21 | End: 2018-08-21 | Stop reason: HOSPADM

## 2018-08-21 RX ORDER — DEXTROSE, SODIUM CHLORIDE, SODIUM LACTATE, POTASSIUM CHLORIDE, AND CALCIUM CHLORIDE 5; .6; .31; .03; .02 G/100ML; G/100ML; G/100ML; G/100ML; G/100ML
INJECTION, SOLUTION INTRAVENOUS CONTINUOUS
Status: DISCONTINUED | OUTPATIENT
Start: 2018-08-21 | End: 2018-08-24 | Stop reason: HOSPADM

## 2018-08-21 RX ORDER — LIDOCAINE 40 MG/G
CREAM TOPICAL
Status: DISCONTINUED | OUTPATIENT
Start: 2018-08-21 | End: 2018-08-24 | Stop reason: HOSPADM

## 2018-08-21 RX ORDER — FLUTICASONE PROPIONATE 50 MCG
1-2 SPRAY, SUSPENSION (ML) NASAL DAILY
Status: DISCONTINUED | OUTPATIENT
Start: 2018-08-22 | End: 2018-08-24 | Stop reason: HOSPADM

## 2018-08-21 RX ORDER — ONDANSETRON 2 MG/ML
4 INJECTION INTRAMUSCULAR; INTRAVENOUS EVERY 30 MIN PRN
Status: DISCONTINUED | OUTPATIENT
Start: 2018-08-21 | End: 2018-08-21 | Stop reason: HOSPADM

## 2018-08-21 RX ORDER — FENTANYL CITRATE 50 UG/ML
25-50 INJECTION, SOLUTION INTRAMUSCULAR; INTRAVENOUS
Status: DISCONTINUED | OUTPATIENT
Start: 2018-08-21 | End: 2018-08-21 | Stop reason: HOSPADM

## 2018-08-21 RX ORDER — DIPHENHYDRAMINE HCL 25 MG
25 CAPSULE ORAL EVERY 6 HOURS PRN
Status: DISCONTINUED | OUTPATIENT
Start: 2018-08-21 | End: 2018-08-24 | Stop reason: HOSPADM

## 2018-08-21 RX ORDER — FENTANYL CITRATE 50 UG/ML
INJECTION, SOLUTION INTRAMUSCULAR; INTRAVENOUS PRN
Status: DISCONTINUED | OUTPATIENT
Start: 2018-08-21 | End: 2018-08-21

## 2018-08-21 RX ORDER — OXYTOCIN/0.9 % SODIUM CHLORIDE 30/500 ML
100 PLASTIC BAG, INJECTION (ML) INTRAVENOUS CONTINUOUS
Status: DISCONTINUED | OUTPATIENT
Start: 2018-08-21 | End: 2018-08-24 | Stop reason: HOSPADM

## 2018-08-21 RX ORDER — AMOXICILLIN 250 MG
1 CAPSULE ORAL 2 TIMES DAILY PRN
Status: DISCONTINUED | OUTPATIENT
Start: 2018-08-21 | End: 2018-08-24 | Stop reason: HOSPADM

## 2018-08-21 RX ORDER — BUPIVACAINE HYDROCHLORIDE 7.5 MG/ML
INJECTION, SOLUTION INTRASPINAL PRN
Status: DISCONTINUED | OUTPATIENT
Start: 2018-08-21 | End: 2018-08-21

## 2018-08-21 RX ORDER — EPHEDRINE SULFATE 50 MG/ML
5 INJECTION, SOLUTION INTRAMUSCULAR; INTRAVENOUS; SUBCUTANEOUS
Status: DISCONTINUED | OUTPATIENT
Start: 2018-08-21 | End: 2018-08-21

## 2018-08-21 RX ORDER — OXYTOCIN 10 [USP'U]/ML
10 INJECTION, SOLUTION INTRAMUSCULAR; INTRAVENOUS
Status: DISCONTINUED | OUTPATIENT
Start: 2018-08-21 | End: 2018-08-24 | Stop reason: HOSPADM

## 2018-08-21 RX ADMIN — PHENYLEPHRINE HYDROCHLORIDE 100 MCG: 10 INJECTION, SOLUTION INTRAMUSCULAR; INTRAVENOUS; SUBCUTANEOUS at 11:56

## 2018-08-21 RX ADMIN — BUPIVACAINE HYDROCHLORIDE IN DEXTROSE 1.7 ML: 7.5 INJECTION, SOLUTION SUBARACHNOID at 11:19

## 2018-08-21 RX ADMIN — MORPHINE SULFATE 0.15 MG: 1 INJECTION, SOLUTION EPIDURAL; INTRATHECAL; INTRAVENOUS at 11:19

## 2018-08-21 RX ADMIN — SODIUM CHLORIDE, SODIUM LACTATE, POTASSIUM CHLORIDE, CALCIUM CHLORIDE, AND DEXTROSE MONOHYDRATE: 600; 310; 30; 20; 5 INJECTION, SOLUTION INTRAVENOUS at 17:12

## 2018-08-21 RX ADMIN — SODIUM CHLORIDE, POTASSIUM CHLORIDE, SODIUM LACTATE AND CALCIUM CHLORIDE: 600; 310; 30; 20 INJECTION, SOLUTION INTRAVENOUS at 11:03

## 2018-08-21 RX ADMIN — PROCHLORPERAZINE EDISYLATE 5 MG: 5 INJECTION INTRAMUSCULAR; INTRAVENOUS at 17:14

## 2018-08-21 RX ADMIN — ONDANSETRON 4 MG: 2 INJECTION INTRAMUSCULAR; INTRAVENOUS at 16:24

## 2018-08-21 RX ADMIN — GENTAMICIN SULFATE 440 MG: 40 INJECTION, SOLUTION INTRAMUSCULAR; INTRAVENOUS at 11:23

## 2018-08-21 RX ADMIN — BUPIVACAINE HYDROCHLORIDE IN DEXTROSE 6 ML: 7.5 INJECTION, SOLUTION SUBARACHNOID at 12:06

## 2018-08-21 RX ADMIN — SODIUM CHLORIDE, POTASSIUM CHLORIDE, SODIUM LACTATE AND CALCIUM CHLORIDE 1000 ML: 600; 310; 30; 20 INJECTION, SOLUTION INTRAVENOUS at 10:20

## 2018-08-21 RX ADMIN — FENTANYL CITRATE 15 MCG: 50 INJECTION, SOLUTION INTRAMUSCULAR; INTRAVENOUS at 11:19

## 2018-08-21 RX ADMIN — ACETAMINOPHEN 975 MG: 325 TABLET, FILM COATED ORAL at 20:42

## 2018-08-21 RX ADMIN — Medication 30 UNITS: at 13:00

## 2018-08-21 RX ADMIN — PHENYLEPHRINE HYDROCHLORIDE 0.5 MCG/KG/MIN: 10 INJECTION, SOLUTION INTRAMUSCULAR; INTRAVENOUS; SUBCUTANEOUS at 11:20

## 2018-08-21 RX ADMIN — ONDANSETRON 4 MG: 2 INJECTION INTRAMUSCULAR; INTRAVENOUS at 11:30

## 2018-08-21 RX ADMIN — BUPIVACAINE HYDROCHLORIDE AND EPINEPHRINE BITARTRATE 20 ML: 2.5; .005 INJECTION, SOLUTION INFILTRATION; PERINEURAL at 12:50

## 2018-08-21 RX ADMIN — BUPIVACAINE 20 ML: 13.3 INJECTION, SUSPENSION, LIPOSOMAL INFILTRATION at 12:50

## 2018-08-21 RX ADMIN — METRONIDAZOLE 500 MG: 500 INJECTION, SOLUTION INTRAVENOUS at 11:23

## 2018-08-21 RX ADMIN — OXYTOCIN-SODIUM CHLORIDE 0.9% IV SOLN 30 UNIT/500ML 300 ML/HR: 30-0.9/5 SOLUTION at 11:50

## 2018-08-21 RX ADMIN — EPINEPHRINE 0.1 MG: 0.1 INJECTION, SOLUTION ENDOTRACHEAL; INTRACARDIAC; INTRAVENOUS at 11:19

## 2018-08-21 ASSESSMENT — ACTIVITIES OF DAILY LIVING (ADL)
FALL_HISTORY_WITHIN_LAST_SIX_MONTHS: NO
SWALLOWING: 0-->SWALLOWS FOODS/LIQUIDS WITHOUT DIFFICULTY
DRESS: 0-->INDEPENDENT
TOILETING: 0-->INDEPENDENT
AMBULATION: 0-->INDEPENDENT
TRANSFERRING: 0-->INDEPENDENT
RETIRED_COMMUNICATION: 0-->UNDERSTANDS/COMMUNICATES WITHOUT DIFFICULTY
COGNITION: 0 - NO COGNITION ISSUES REPORTED
BATHING: 0-->INDEPENDENT
RETIRED_EATING: 0-->INDEPENDENT

## 2018-08-21 NOTE — ANESTHESIA PROCEDURE NOTES
Spinal/LP Procedure Note    Spinal Block  Staff:     Anesthesiologist:  TORIE ABARCA    Resident/CRNA:  JUAN CARLOS WREN    Spinal/LP performed by resident/CRNA in presence of a teaching physician.    Location: OB  Procedure Start/Stop Times:      8/21/2018 11:10 AM     8/21/2018 11:20 AM    patient identified, IV checked, site marked, risks and benefits discussed, informed consent, monitors and equipment checked, pre-op evaluation, at physician/surgeon's request and post-op pain management      Correct Patient: Yes      Correct Position: Yes      Correct Site: Yes      Correct Procedure: Yes      Correct Laterality:  Yes    Site Marked:  Yes  Procedure:     Procedure:  Intrathecal    ASA:  2    Position:  Sitting    Sterile Prep: chloraprep, mask, sterile gloves and patient draped      Insertion site:  L3-4    Approach:  Midline    Needle Type:  Khalida    Needle gauge (G):  25    Local Skin Infiltration:  1% lidocaine    amount (ml):  3    Needle Length (in):  3.5    Introducer used: Yes      Introducer gauge:  20 G    Attempts:  1    Redirects:  1    CSF:  Clear    Paresthesias:  No    Time injected:  11:19  Assessment/Narrative:     Sensory Level:  T4

## 2018-08-21 NOTE — ANESTHESIA PREPROCEDURE EVALUATION
Anesthesia Evaluation       history and physical reviewed . Pt has had prior anesthetic. Type: General    No history of anesthetic complications          ROS/MED HX    ENT/Pulmonary:  - neg pulmonary ROS     Neurologic:  - neg neurologic ROS     Cardiovascular:     (+) hypertension-range: gestational htn (normal upc), -PIH, --. : . . . :. . No previous cardiac testing       METS/Exercise Tolerance:     Hematologic:     (+) Anemia, Other Hematologic Disorder-FV Leiden       Musculoskeletal:  - neg musculoskeletal ROS       GI/Hepatic:  - neg GI/hepatic ROS   (+) GERD Symptomatic,       Renal/Genitourinary:     (+) Other Renal/ Genitourinary, Donor nephrectomy      Endo:  - neg endo ROS       Psychiatric:  - neg psychiatric ROS       Infectious Disease:  - neg infectious disease ROS       Malignancy:      - no malignancy   Other:    (+) Possibly pregnant C-spine cleared: Yes, no H/O Chronic Pain,no other significant disability                    Physical Exam  Normal systems: cardiovascular, pulmonary and dental    Airway   Mallampati: II  TM distance: >3 FB  Neck ROM: full    Dental     Cardiovascular       Pulmonary           neg OB ROS  (-) no pre-eclampsia               Anesthesia Plan      History & Physical Review  History and physical reviewed and following examination; no interval change.    ASA Status:  2 .    NPO Status:  > 8 hours (aspiration risk d/t pregnancy status)    Plan for Spinal and Periph. Nerve Block for postop pain   PONV prophylaxis:  Ondansetron (or other 5HT-3) and Dexamethasone or Solumedrol    SUMMARY: Ruma Vega is a 37 year old  with a history of FV Leiden and gestational HTN who presents @38.3 WGA for primary  d/t LGA fetus. Plan for spinal with post operative TAP block for postoperative analgesia. She stopped ASA @37WGA and will start Lovenox 12hrs s/p CS.           Postoperative Care  Postoperative pain management:  Multi-modal analgesia, Peripheral nerve block  (Single Shot) and Neuraxial analgesia.      Consents  Anesthetic plan, risks, benefits and alternatives discussed with:  Patient..

## 2018-08-21 NOTE — PLAN OF CARE
Transferred to room 7123 on cart with baby on moms chest.  Report given to JUAN Fontana RN. Bands checked with BELA Dennis RN.  Patient had an emesis on transfer with movement. Nausea subsided after transfer.

## 2018-08-21 NOTE — ANESTHESIA PROCEDURE NOTES
Peripheral Nerve Block Procedure Note    Staff:     Anesthesiologist:  TORIE ABARCA    Resident/CRNA:  JUAN CARLOS WREN    Block performed by resident/CRNA in the presence of a teaching physician    Location: OB  Procedure Start/Stop TImes:      8/21/2018 12:45 PM     8/21/2018 12:55 PM    patient identified, IV checked, site marked, risks and benefits discussed, informed consent, monitors and equipment checked, pre-op evaluation, at physician/surgeon's request and post-op pain management      Correct Patient: Yes      Correct Position: Yes      Correct Site: Yes      Correct Procedure: Yes      Correct Laterality:  Yes    Site Marked:  Yes  Procedure details:     Procedure:  TAP    ASA:  2    Laterality:  Bilateral    Position:  Supine    Sterile Prep: chloraprep, mask and sterile gloves      Local skin infiltration:  None    Needle:  Short bevel    Needle gauge:  21    Needle length (mm):  110    Ultrasound: Yes      Ultrasound used to identify targeted nerve, plexus, or vascular structure and placed a needle adjacent to it      Permanent Image entered into patiient's record      Abnormal pain on injection: No      Blood Aspirated: No      Paresthesias:  No    Bleeding at site: No      Bolus via:  Needle    Infusion Method:  Single Shot    Complications:  None  Assessment/Narrative:     Injection made incrementally with aspirations every (mL):  5

## 2018-08-21 NOTE — IP AVS SNAPSHOT
MRN:[id number]                      After Visit Summary   2018    Ruma Vega    MRN: [id number]           Thank you!     Thank you for choosing [facility] for your care. Our goal is always to provide you with excellent care. Hearing back from our patients is one way we can continue to improve our services. Please take a few minutes to complete the written survey that you may receive in the mail after you visit with us. Thank you!        Patient Information     Date Of Birth          1980        Designated Caregiver       Most Recent Value    Caregiver    Will someone help with your care after discharge? no      About your hospital stay     You were admitted on:  2018 You last received care in the:  [facility]    You were discharged on:  2018        Reason for your hospital stay       Maternity care            Reason for your hospital stay       Maternity care                  Who to Call     For medical emergencies, please call 911.  For non-urgent questions about your medical care, please call your primary care provider or clinic, [phone]  For questions related to your surgery, please call your surgery clinic        Attending Provider     Provider Agnes Fonseca MD OB/Gyn       Primary Care Provider Office Phone # Fax #    Shannon Jerri Holder [phone]      After Care Instructions     Activity       Review discharge instructions            Activity       Review discharge instructions            Diet       Resume previous diet            Diet       Resume previous diet            Discharge Instructions - Hypertensive disorders patients       High Blood pressure patients to follow up in clinic within one week for a blood pressure check            Discharge Instructions - Postpartum visit       Schedule postpartum visit with your provider and return to clinic in 6 weeks.    Activity Instructions:   -  delivery: No  lifting more than 15 pounds for 6 weeks.  Nothing in the vagina for 6 weeks, no intercourse for 6 weeks. No strenuous exercise for 6 weeks. No driving until you have stopped taking your pain medications.      Call your health care provider if you have any of the following: Fever above 100.4 F; opening or drainage from your incision; soaking a sanitary pad with blood within 1 hour, or you see blood clots larger than a golf ball; malodorous vaginal discharge, severe or worsening pain uncontrolled by your pain medications, nausea and vomiting, severe headaches, changes in vision, calf swelling or pain, shortness of breath, problems coping with sadness, anxiety, or depression.  If you have any concerns about hurting yourself or the baby, call your provider immediately. You are encouraged to call with questions or concerns after you return home.            Discharge Instructions - Postpartum visit       Schedule postpartum visit with your provider and return to clinic in 6 weeks.                  Follow-up Appointments     Follow Up and recommended labs and tests       Follow-up in 6 weeks for postpartum visit                  Your next 10 appointments already scheduled     Aug 27, 2018  3:30 PM CDT   (Arrive by 3:15 PM)   US OB SINGLE FOLLOW UP REPEAT with [id number]   [facility] ([facility])    [facility]  3rd Floor, [address]  [address] [place] [address] [phone]           Please bring a list of your medicines (including vitamins, minerals and over-the-counter drugs). Also, tell your doctor about any allergies you may have. Wear comfortable clothes and leave your valuables at home.  Drink four 8-ounce glasses of fluid an hour before your exam. If you need to empty your bladder before your exam, try to release only a little urine. Then, drink another glass of fluid.  You may have up to two family members in the exam room. If you bring a small  child, an adult must be there to care for him or her. No video or camera photography during the procedure.  Please call the Imaging Department at your exam site with any questions.            Aug 27, 2018  4:00 PM CDT   RETURN OB with Agnes Wheeler MD   [facility] ([facility])    [facility]  [address]  [address]  [facility] [address]   [phone]            Sep 05, 2018 11:30 AM CDT   RETURN OB with Agnes Wheeler MD   [facility] ([facility])    [facility]  [address]  [address]  [facility] [address]   [phone]              Further instructions from your care team       Postop  Birth Instructions    Activity       Do not lift more than 10 pounds for 6 weeks after surgery.  Ask family and friends for help when you need it.    No driving until you have stopped taking your pain medications (usually two weeks after surgery).    No heavy exercise or activity for 6 weeks.  Don't do anything that will put a strain on your surgery site.    Don't strain when using the toilet.  Your care team may prescribe a stool softener if you have problems with your bowel movements.     To care for your incision:       Keep the incision clean and dry.    Do not soak your incision in water. No swimming or hot tubs until it has fully healed. You may soak in the bathtub if the water level is below your incision.    Do not use peroxide, gel, cream, lotion, or ointment on your incision.    Adjust your clothes to avoid pressure on your surgery site (check the elastic in your underwear for example).     You may see a small amount of clear or pink drainage and this is normal.  Check with your health care provider:       If the drainage increases or has an odor.    If the incision reddens, you have swelling, or develop a rash.    If you have increased pain and the medicine we prescribed doesn't  help.    If you have a fever above 100.4 F (38 C) with or without chills when placing thermometer under your tongue.   The area around your incision (surgery wound), will feel numb.  This is normal. The numbness should go away in less than a year.     Keep your hands clean:  Always wash your hands before touching your incision (surgery wound). This helps reduce your risk of infection. If your hands aren't dirty, you may use an alcohol hand-rub to clean your hands. Keep your nails clean and short.    Call your healthcare provider if you have any of these symptoms:       You soak a sanitary pad with blood within 1 hour, or you see blood clots larger than a golf ball.    Bleeding that lasts more than 6 weeks.    Vaginal discharge that smells bad.    Severe pain, cramping or tenderness in your lower belly area.    A need to urinate more frequently (use the toilet more often), more urgently (use the toilet very quickly), or it burns when you urinate.    Nausea and vomiting.    Redness, swelling or pain around a vein in your leg.    Problems breastfeeding or a red or painful area on your breast.    Chest pain and cough or are gasping for air.    Problems with coping with sadness, anxiety or depression. If you have concerns about hurting yourself or the baby, call your provider immediately.      You have questions or concerns after you return home.                  Pending Results     Date and Time Order Name Status Description    8/21/2018 1324 Placenta path order and indications In process             Statement of Approval     Ordered          08/24/18 0841  I have reviewed and agree with all the recommendations and orders detailed in this document.  EFFECTIVE NOW     Approved and electronically signed by:  Maren Mcgovern MD             Admission Information     Date & Time Provider Department Dept. Phone    8/21/2018 Agnes Wheeler MD [facility] [phone]      Your Vitals Were     Blood Pressure Pulse  "Temperature Respirations Height Weight    147/87 78 98.2  F (36.8  C) (Oral) 21 1.753 m (5' 9\") 121.1 kg (267 lb)    Pulse Oximetry BMI (Body Mass Index)                97% 39.43 kg/m2          MyChart Information     [employer] gives you secure access to your electronic health record. If you see a primary care provider, you can also send messages to your care team and make appointments. If you have questions, please call your primary care clinic.  If you do not have a primary care provider, please call [phone] and they will assist you.        Care EveryWhere ID     This is your Care EveryWhere ID. This could be used by other organizations to access your [facility] medical records  [id number]        Equal Access to Services     JOSÉ ANTONIO WESTFALL : Franck Parks, michelle thompson, viviana gonzalez, luba garcia. So [facility] [phone].    ATENCIÓN: Si habla español, tiene a figueroa disposición servicios gratuitos de asistencia lingüística. Llame al [phone].    We comply with applicable federal civil rights laws and [place] laws. We do not discriminate on the basis of race, color, national origin, age, disability, sex, sexual orientation, or gender identity.               Review of your medicines      START taking        Dose / Directions    acetaminophen 325 MG tablet   Commonly known as:  TYLENOL        Dose:  650 mg   Take 2 tablets (650 mg) by mouth every 4 hours as needed for pain   Quantity:  100 tablet   Refills:  0       enoxaparin 40 MG/0.4ML injection   Commonly known as:  LOVENOX   Used for:  Factor V Leiden (H)        Dose:  40 mg   Inject 0.4 mLs (40 mg) Subcutaneous every 24 hours Inject 40mg (0.4mls) every 24 hours until 9/28/2018   Quantity:  14.4 mL   Refills:  0       ferrous sulfate 325 (65 Fe) MG tablet   Commonly known as:  IRON   Used for:  Anemia due to blood loss, acute        Dose:  325 mg   Take 1 tablet (325 mg) by mouth daily with food "   Quantity:  90 tablet   Refills:  0       senna-docusate 8.6-50 MG per tablet   Commonly known as:  SENOKOT-S;PERICOLACE        Dose:  1 tablet   Take 1 tablet by mouth 2 times daily as needed for constipation   Quantity:  60 tablet   Refills:  0         CONTINUE these medicines which have NOT CHANGED        Dose / Directions    aspirin 81 MG tablet        Take by mouth daily   Refills:  0       FLONASE 50 MCG/ACT spray   Used for:  Allergic rhinitis   Generic drug:  fluticasone        Dose:  1-2 spray   Spray 1-2 sprays into both nostrils daily   Quantity:  1 Package   Refills:  11       FOLIC ACID PO        Dose:  800 mcg   Take 800 mcg by mouth daily   Refills:  0       ketotifen 0.025 % Soln ophthalmic solution   Commonly known as:  ZADITOR/REFRESH ANTI-ITCH        Dose:  1 drop   Place 1 drop into both eyes 2 times daily   Refills:  0       mometasone 0.1 % cream   Commonly known as:  ELOCON        Apply topically daily   Refills:  0       PRENATAL VITAMINS PO   Used for:  Flu vaccine need        Take  by mouth daily.   Refills:  0       psyllium 58.6 % Powd   Commonly known as:  METAMUCIL        Take by mouth daily   Refills:  0       ranitidine 150 MG tablet   Commonly known as:  ZANTAC        Dose:  150 mg   Take 150 mg by mouth 2 times daily   Refills:  0       SINUS RINSE BOTTLE KIT NA        Dose:  1 packet   Spray 1 packet in nostril as needed   Refills:  0       VITAMIN D-3 PO        Refills:  0            Where to get your medicines      These medications were sent to [facility] [place], [place] - [address]  [address] [facility] [address] [address]     Phone:  [phone]     acetaminophen 325 MG tablet    enoxaparin 40 MG/0.4ML injection    ferrous sulfate 325 (65 Fe) MG tablet    senna-docusate 8.6-50 MG per tablet                Protect others around you: Learn how to safely use, store and throw away your medicines at www.disposemymeds.org.        Information about your  "nerve block     Today you received a block to numb the nerves near your surgery site.    This is a block using local anesthetic or \"numbing\" medication injected around the nerves to anesthetize or \"numb\" the area supplied by those nerves. This block is injected into the muscle layer near your surgical site. The type of anesthesia (Exparel) your anesthesia team used to numb your abdomen may give you relief for up to 72 hours.     Diet: There are no diet restrictions, but you should drink plenty of fluids, unless you are on a fluid-restricted diet.     Activity: If your surgical site is an arm or leg you should be careful with your affected limb, since it is possible to injure your limb without being aware of it due to the numbing. Until full feeling returns, you should guard against bumping or hitting your limb, and avoid extreme hot or cold temperatures on the skin.    Pain Medication: As the block wears off, the feeling will return as a tingling or prickly sensation near your surgical site. You will experience more discomfort from your incisions as the feeling returns. You may want to take a pain pill (a narcotic or Tylenol if this was prescribed by your surgeon) when you start to experience mild pain, before the pain becomes more severe. If your pain medications do not control your pain, you should notify your surgeon. If you are taking narcotics for pain management, do not drink alcohol, drive a car, or perform hazardous activities.  If you have questions or concerns you may call your surgeon at the number provided with your discharge instructions.     Call your surgeon if you experience blurry vision, ringing in the ears or metallic taste in your mouth.              Medication List: This is a list of all your medications and when to take them. Check marks below indicate your daily home schedule. Keep this list as a reference.      Medications           Morning Afternoon Evening Bedtime As Needed    acetaminophen " 325 MG tablet   Commonly known as:  TYLENOL   Take 2 tablets (650 mg) by mouth every 4 hours as needed for pain   Last time this was given:  975 mg on 8/24/2018  4:35 AM                                aspirin 81 MG tablet   Take by mouth daily                                enoxaparin 40 MG/0.4ML injection   Commonly known as:  LOVENOX   Inject 0.4 mLs (40 mg) Subcutaneous every 24 hours Inject 40mg (0.4mls) every 24 hours until 9/28/2018   Last time this was given:  40 mg on 8/23/2018  9:05 AM                                ferrous sulfate 325 (65 Fe) MG tablet   Commonly known as:  IRON   Take 1 tablet (325 mg) by mouth daily with food                                FLONASE 50 MCG/ACT spray   Spray 1-2 sprays into both nostrils daily   Last time this was given:  2 sprays on 8/22/2018  2:43 PM   Generic drug:  fluticasone                                FOLIC ACID PO   Take 800 mcg by mouth daily                                ketotifen 0.025 % Soln ophthalmic solution   Commonly known as:  ZADITOR/REFRESH ANTI-ITCH   Place 1 drop into both eyes 2 times daily   Last time this was given:  1 drop on 8/23/2018  9:25 PM                                mometasone 0.1 % cream   Commonly known as:  ELOCON   Apply topically daily                                PRENATAL VITAMINS PO   Take  by mouth daily.                                psyllium 58.6 % Powd   Commonly known as:  METAMUCIL   Take by mouth daily                                ranitidine 150 MG tablet   Commonly known as:  ZANTAC   Take 150 mg by mouth 2 times daily   Last time this was given:  150 mg on 8/23/2018  7:39 PM                                senna-docusate 8.6-50 MG per tablet   Commonly known as:  SENOKOT-S;PERICOLACE   Take 1 tablet by mouth 2 times daily as needed for constipation   Last time this was given:  2 tablets on 8/23/2018  7:39 PM                                SINUS RINSE BOTTLE KIT NA   Spray 1 packet in nostril as needed                                 VITAMIN D-3 PO

## 2018-08-21 NOTE — MR AVS SNAPSHOT
After Visit Summary   8/21/2018    Ruma Vega    MRN: [id number]           Patient Information     Date Of Birth          1980        Visit Information        Provider Department      8/21/2018 8:30 AM Nurse, [facility]        Today's Diagnoses     High risk pregnancy, antepartum    -  1       Follow-ups after your visit        Your next 10 appointments already scheduled     Aug 21, 2018   Procedure with Agnes Wheeler MD   [facility] (--)    [address] [facility] [address] [phone]            Aug 27, 2018  3:30 PM CDT   (Arrive by 3:15 PM)   US OB SINGLE FOLLOW UP REPEAT with [email or web address]   [facility] ([facility])    [facility]  3rd Floor, [address]  [address] [facility] [address] [phone]           Please bring a list of your medicines (including vitamins, minerals and over-the-counter drugs). Also, tell your doctor about any allergies you may have. Wear comfortable clothes and leave your valuables at home.  Drink four 8-ounce glasses of fluid an hour before your exam. If you need to empty your bladder before your exam, try to release only a little urine. Then, drink another glass of fluid.  You may have up to two family members in the exam room. If you bring a small child, an adult must be there to care for him or her. No video or camera photography during the procedure.  Please call the Imaging Department at your exam site with any questions.            Aug 27, 2018  4:00 PM CDT   RETURN OB with Agnes Wheeler MD   [facility] ([facility])    [facility]  [address]  [address] [facility] [address] [phone]            Aug 29, 2018   Procedure with Agnes Wheeler MD   [facility] (--)    [address] [facility] [address]   [phone]            Sep 05, 2018 11:30 AM CDT   RETURN OB with Agnes  Lulu Wheeler MD   [facility] ([facility])    [facility]  [address]  [address]  [facility] [id number] [phone]              Who to contact     Please call your clinic at [phone] to:    Ask questions about your health    Make or cancel appointments    Discuss your medicines    Learn about your test results    Speak to your doctor            Additional Information About Your Visit        [employer] Information     [email or web address] gives you secure access to your electronic health record. If you see a primary care provider, you can also send messages to your care team and make appointments. If you have questions, please call your primary care clinic.  If you do not have a primary care provider, please call [phone] and they will assist you.      [email or web address] is an electronic gateway that provides easy, online access to your medical records. With [email or web address] you can request a clinic appointment, read your test results, renew a prescription or communicate with your care team.     To access your existing account, please contact your [facility] or call [phone] for assistance.        Care EveryWhere ID     This is your Care EveryWhere ID. This could be used by other organizations to access your [facility] medical records  [id number]        Your Vitals Were     Pulse                   101            Blood Pressure from Last 3 Encounters:   08/21/18 130/86   08/20/18 139/88   08/13/18 130/86    Weight from Last 3 Encounters:   08/20/18 121.5 kg (267 lb 14.4 oz)   08/13/18 120.3 kg (265 lb 3.2 oz)   08/06/18 118.5 kg (261 lb 4.8 oz)              Today, you had the following     No orders found for display       Primary Care Provider Office Phone # Fax #    Shannon Holder [phone]       [facility] [address]  [facility] [address]        Equal Access to Services     JOSÉ ANTONIO WESTFALL AH: Franck white  anastacio Parks, waloganda luqadaha, qaybta kaalmada carlos, luba matin hayaadiaz bennettadelita nellypratibha laPatyjanine jose. So [facility] [phone].    ATENCIÓN: Si aleksandrala paul, tiene a figueroa disposición servicios gratuitos de asistencia lingüística. [place] al [phone].    We comply with applicable federal civil rights laws and [place] laws. We do not discriminate on the basis of race, color, national origin, age, disability, sex, sexual orientation, or gender identity.            Thank you!     Thank you for choosing [facility]  for your care. Our goal is always to provide you with excellent care. Hearing back from our patients is one way we can continue to improve our services. Please take a few minutes to complete the written survey that you may receive in the mail after your visit with us. Thank you!             Your Updated Medication List - Protect others around you: Learn how to safely use, store and throw away your medicines at www.disposemymeds.org.          This list is accurate as of 8/21/18  8:42 AM.  Always use your most recent med list.                   Brand Name Dispense Instructions for use Diagnosis    aspirin 81 MG tablet      Take by mouth daily        FLONASE 50 MCG/ACT spray   Generic drug:  fluticasone     1 Package    Spray 1-2 sprays into both nostrils daily    Allergic rhinitis       FOLIC ACID PO      Take 800 mcg by mouth daily        ketotifen 0.025 % Soln ophthalmic solution    ZADITOR/REFRESH ANTI-ITCH     Place 1 drop into both eyes 2 times daily        mometasone 0.1 % cream    ELOCON     Apply topically daily        PRENATAL VITAMINS PO      Take  by mouth daily.    Flu vaccine need       psyllium 58.6 % Powd    METAMUCIL     Take by mouth daily        ranitidine 150 MG tablet    ZANTAC     Take 150 mg by mouth 2 times daily        SINUS RINSE BOTTLE KIT NA      Spray 1 packet in nostril as needed        VITAMIN D-3 PO

## 2018-08-21 NOTE — DISCHARGE SUMMARY
[facility] Discharge Summary    Ruma Vega MRN# [id number]   Age: 37 year old YOB: 1980     Date of Admission:  2018  Date of Discharge::  18  Admitting Physician:  Agnes Wheeler MD  Discharge Physician:  Maren Mcgovern MD             Admission Diagnoses:   - at 38w3d  -Gestational hypertension diagnosed on , HELLP labs wnl aside from creatinine of 1.08, UPC <0.3  -Donated kidney in , baseline creatinine 0.75 on , increased to 1.08 on    -LGA fetus, EFW 98%tile at 36w, BPD, HC, AC all >99%tile  -Factor V Leiden heterozygosity, plan for Lovenox ppx x 6weeks postpartum  -Advanced maternal age          Discharge Diagnosis:   Same, delivered             Procedures:   Procedure(s): Primary low transverse  section with double layer closure via Pfannenstiel skin incision  Spinal anesthesia  TAPS block                Medications Prior to Admission:     Prescriptions Prior to Admission   Medication Sig Dispense Refill Last Dose     aspirin 81 MG tablet Take by mouth daily   2018 at Unknown time     Cholecalciferol (VITAMIN D-3 PO)    2018 at Unknown time     fluticasone (FLONASE) 50 MCG/ACT nasal spray Spray 1-2 sprays into both nostrils daily 1 Package 11 2018 at 0700     FOLIC ACID PO Take 800 mcg by mouth daily   2018 at Unknown time     ketotifen (ZADITOR/REFRESH ANTI-ITCH) 0.025 % SOLN Place 1 drop into both eyes 2 times daily   2018 at Unknown time     PRENATAL VITAMINS PO Take  by mouth daily.   2018 at Unknown time     psyllium (METAMUCIL) 58.6 % POWD Take by mouth daily   2018 at Unknown time     ranitidine (ZANTAC) 150 MG tablet Take 150 mg by mouth 2 times daily   2018 at Unknown time     Hypertonic Nasal Wash (SINUS RINSE BOTTLE KIT NA) Spray 1 packet in nostril as needed   2018     mometasone (ELOCON) 0.1 % cream Apply topically daily   More than a month at Unknown time             Discharge  Medications:        Review of your medicines      START taking       Dose / Directions    acetaminophen 325 MG tablet   Commonly known as:  TYLENOL        Dose:  650 mg   Start taking on:  8/24/2018   Take 2 tablets (650 mg) by mouth every 4 hours as needed for pain   Quantity:  100 tablet   Refills:  0       enoxaparin 40 MG/0.4ML injection   Commonly known as:  LOVENOX   Used for:  Factor V Leiden (H)        Dose:  40 mg   Start taking on:  8/23/2018   Inject 0.4 mLs (40 mg) Subcutaneous every 24 hours Inject 40mg (0.4mls) every 24 hours until 9/28/2018   Quantity:  14.4 mL   Refills:  0       ferrous sulfate 325 (65 Fe) MG tablet   Commonly known as:  IRON   Used for:  Anemia due to blood loss, acute        Dose:  325 mg   Take 1 tablet (325 mg) by mouth daily with food   Quantity:  90 tablet   Refills:  0       senna-docusate 8.6-50 MG per tablet   Commonly known as:  SENOKOT-S;PERICOLACE        Dose:  1 tablet   Take 1 tablet by mouth 2 times daily as needed for constipation   Quantity:  60 tablet   Refills:  0         CONTINUE these medicines which have NOT CHANGED       Dose / Directions    aspirin 81 MG tablet        Take by mouth daily   Refills:  0       FLONASE 50 MCG/ACT spray   Used for:  Allergic rhinitis   Generic drug:  fluticasone        Dose:  1-2 spray   Spray 1-2 sprays into both nostrils daily   Quantity:  1 Package   Refills:  11       FOLIC ACID PO        Dose:  800 mcg   Take 800 mcg by mouth daily   Refills:  0       ketotifen 0.025 % Soln ophthalmic solution   Commonly known as:  ZADITOR/REFRESH ANTI-ITCH        Dose:  1 drop   Place 1 drop into both eyes 2 times daily   Refills:  0       mometasone 0.1 % cream   Commonly known as:  ELOCON        Apply topically daily   Refills:  0       PRENATAL VITAMINS PO   Used for:  Flu vaccine need        Take  by mouth daily.   Refills:  0       psyllium 58.6 % Powd   Commonly known as:  METAMUCIL        Take by mouth daily   Refills:  0        ranitidine 150 MG tablet   Commonly known as:  ZANTAC        Dose:  150 mg   Take 150 mg by mouth 2 times daily   Refills:  0       SINUS RINSE BOTTLE KIT NA        Dose:  1 packet   Spray 1 packet in nostril as needed   Refills:  0       VITAMIN D-3 PO        Refills:  0            Where to get your medicines      These medications were sent to [place], [place] - [address]  [address] [facility], [facility] [place] [address]     Phone:  [phone]      acetaminophen 325 MG tablet     enoxaparin 40 MG/0.4ML injection     ferrous sulfate 325 (65 Fe) MG tablet     senna-docusate 8.6-50 MG per tablet                   Consultations:   Anesthesia  Lactation           Brief Admission History:     Ms. Ruma Vega is a 37 year old now  who initially presented at 38w3d for scheduled  for gestational hypertension and suspected LGA fetus.        Intraoperative course   The procedure was uncomplicated.  QBL 1218 mL.  See operative report for details.     Operative findings:   1. Single, viable male infant at 1149 hours on 2018. Apgars at one and five minutes pending.  Birth weight: pending.  Fetal presentation: BETO. Amniotic fluid: clear.    2. Placenta intact with 3 vessel cord.     4. Normal appearing uterus, fallopian tubes, ovaries. 1 cm serosal fibroids seen on the fundus and posterior wall of the uterus.  5.  No intraabdominal adhesions.  No abdominal wall adhesions        Postpartum Course   The patient's hospital course was unremarkable.  She recovered as anticipated and experienced no post-operative complications.  On discharge, her pain was well controlled. Vaginal bleeding is similar to peak menstrual flow.  Voiding without difficulty.  Ambulating well, tolerating a normal diet, and has resumption of bowel function.  No fever or significant wound drainage.  Breastfeeding well.  Infant is stable. She was discharged on post-partum day #3.    Post-partum  hemoglobin:     Recent Labs  Lab 08/22/18  0701 08/21/18  2109 08/20/18  1701   HGB 9.3* 9.8* 11.6*     Contraception: considering Mirena IUD at 6 weeks postpartum  Rh positive, no Rhogam indicated  Rubella immune, no MMR indicated          Discharge Instructions and Follow-Up:   Discharge diet: Regular   Discharge activity: No lifting greater than 20 lbs, pushing, pulling, or other strenuous activity for 6 weeks. Pelvic rest for 6 weeks including no sexual intercourse, tampons, or douching. No driving until you can slam on the breaks without pain or while on narcotic pain medications.    Discharge follow-up: Follow up with primary OB for BP check in one week and routine postpartum visit in 6 weeks     Wound care: Keep incision clean and dry           Discharge Disposition:   Discharged to home in good condition      # Discharge Pain Plan:   - During her hospitalization, Ruma experienced pain due to postoperative state.  The pain plan for discharge was discussed with Ruma and the plan was created in a collaborative fashion.    - Please see above listed pain medications    Alicia Myhre, DO  OB/GYN, PGY-2        Maren Mcogvern MD  8/24/2018

## 2018-08-21 NOTE — NURSING NOTE
Chief Complaint   Patient presents with     Allied Health Visit     Patient presented to clinic for blood pressure check average was 130/86. Paged  to discuss results.

## 2018-08-21 NOTE — BRIEF OP NOTE
[facility]  Brief Operative Note    Date of Surgery: 2018    Preop Dx:    -  at 38w3d   -Gestational hypertension diagnosed on , HELLP labs wnl aside from creatinine of 1.08, UPC <0.3  -Donated kidney in , baseline creatinine 0.75 on , increased to 1.08 on  (yesterday)  -LGA fetus, EFW 98%tile at 36w, BPD, HC, AC all >99%tile  -Factor V Leiden heterozygosity, plan for Lovenox ppx x 6weeks postpartum  -Advanced maternal age    Postop Dx:     - , now delivered    Procedure: Primary low transverse  section with double layer closure via pfannenstiel skin incision    Surgeon:  Agnes Wheeler MD    Assistants: Raisa Pinto MD PGY-2        Anesthesia: Spinal anesthesia    EBL:  1218 cc    IVF:  1000 cc    UOP:  100 cc    Drains:  Tubbs catheter    Specimen: Placenta and cord blood    Complications: None apparent     Findings:   1. Single, viable male infant at 1149 hours on 2018. Apgars at one and five minutes pending.  Birth weight: pending.  Fetal presentation: BETO. Amniotic fluid: clear.    2. Placenta intact with 3 vessel cord.     4. Normal appearing uterus, fallopian tubes, ovaries. 1 cm serosal fibroids seen on the fundus and posterior wall of the uterus.  5.  No intraabdominal adhesions.  No abdominal wall adhesions     Disposition: Transferred in stable condition to the PACU    Raisa Pinto MD  Ob/Gyn PGY-2  18 11:17 AM

## 2018-08-21 NOTE — OP NOTE
[facility]   OPERATIVE NOTE:  SECTION     Surgery Date:  2018  Surgeon(s): Nalini Em MD  Assistants:  Raisa Pinto MD, PGY2    Preoperative Diagnoses:  - at 38w3d  -Gestational hypertension diagnosed on , HELLP labs wnl aside from creatinine of 1.08, UPC <0.3  -Donated kidney in , baseline creatinine 0.75 on , increased to 1.08 on  (yesterday)  -LGA fetus, EFW 98%tile at 36w, BPD, HC, AC all >99%tile  -Factor V Leiden heterozygosity, plan for Lovenox ppx x 6weeks postpartum  -Advanced maternal age    Postoperative diagnoses:   at 38w3d, now delivered    Procedure performed:  Primary low segment transverse  section via Pfannenstiel skin incision with double layer uterine closure    Anesthesia:  Spinal with duramorph  Est Blood Loss (mL): 1218 mL  Fluid replacement: 1000 mL crystalloid.  Urine output: 100 mL clear urine at the end of the case   Specimens: Cord blood, placenta  Complications: None      Operative findings:   1. Single, viable male infant at 1149 hours on 2018. Apgars at one and five minutes pending.  Birth weight: pending.  Fetal presentation: BETO. Amniotic fluid: clear.    2. Placenta intact with 3 vessel cord.     4. Normal appearing uterus, fallopian tubes, ovaries. 1 cm serosal fibroids seen on the fundus and posterior wall of the uterus.  5.  No intraabdominal adhesions.  No abdominal wall adhesions     Indication: Ruma Vega is a 37 year old, , who was admitted at 38w3d by 9w1d ultrasound for scheduled  section. Patient met criteria for gestational hypertension due to elevated BP's >4 hours apart yesterday. She was also noted to have LGA fetus with EFW 98%tile at 36w. She was counseled on risks of PLTCS vs IOL. The risks, benefits, and alternatives of  delivery were explained and the patient agreed to proceed.     Procedure details:  After obtaining informed consent, the  patient was taken to the operating room. She received IV Gentamicin and Metronidazole prior to the skin incision. She was placed in the dorsal supine position with a leftward tilt and prepped and draped in the usual sterile fashion.     Following test of adequate spinal anesthesia, the abdomen was entered through a Pfannenstiel skin incision. The skin incision was made sharply and carried through the subcutaneous tissue to the fascia.  Fascia was incised in the midline and extended laterally with the Hassan scissors.  The superior margin of the fascial incision was grasped with Kocher clamps and dissected from the underlying muscle with sharp and blunt dissecton, which was then repeated at the lower margin of the fascial incision.  The muscle was  in the midline.      The peritoneum was entered bluntly and the opening extended by digital dissection with care to avoid the bladder. A bladder blade was placed. The lower segment of the uterus was opened sharply in a transverse fashion and extended with digital pressure. Amniotomy was performed with Allis clamp. The infant's head was noted to be in the BETO position. It was elevated to the level of the hysterotomy and was delivered atraumatically, shoulders delivered easily thereafter. The cord was doubly clamped after 60 seconds and cut and the infant was handed off to the waiting nursing staff. A segment of the cord was cut and set aside for cord gases if needed.     The placenta was expressed.  The uterus was exteriorized from the abdomen and cleared of all clots and debris.  The uterus was massaged and was noted to be boggy.  Pitocin was given through the running IV.  With vigorous massage as well as administration of pitocin, good uterine tone was achieved. The hysterotomy was repaired with 0-vicryl suture in a running locked fashion. A 2nd layer of 0-vicryl was used to imbricate the incision and good hemostasis was achieved. There was an area of serosal  oozing on the hysterotomy 1 cm medial to the apex that was controlled with Figure of X stitch using 0-monocryl. The posterior cul-de-sac was suctioned and the uterus was returned to the abdomen.      The bilateral pericolic gutters were suctioned.  The hysterotomy was again inspected and found to be hemostatic.  The abdominal wall was examined and also found to be hemostatic.  The fascia was closed with a running suture of 0-Vicryl.  Subcutaneous tissue was irrigated. Areas that were not hemostatic were controlled with cautery. The subcutaneous tissue was re-approximated in a running fashion with 3-0 vicryl. The skin was closed with 4-0 monocryl. Steri strips and sterile bandage were placed. The patient tolerated the procedure well and was taken to the recovery room in stable condition. All sponge, needle and instrument counts were correct x2.     Dr. Wheeler was present for the entire procedure.     Raisa Pinto MD  Ob/Gyn PGY-2  08/21/18 5:47 PM       Staff MD Note  I was present and scrubbed for the entire procedure noted above.  I agree with the description above and any necessary changes have been made by me.  Agnes Wheeler MD

## 2018-08-21 NOTE — PLAN OF CARE
Problem: Patient Care Overview  Goal: Plan of Care/Patient Progress Review  Patient had a scheduled c/ section today for GHTN and LGA. Also has  factor V leiden and history of kidney donation. Delivered Male at 1149. QBL was  1218. TAPS block done in the OR. Stable recovery in the PACU. Baby with mom skin to skin and breastfeeding. Mom taking ice chips and sips without nausea

## 2018-08-21 NOTE — H&P
L&D History and Physical   2018  Ruma Vega  [id number]      HPI: Ruma Vega is a 37 year old  at 38w3d by 9w1d US who presents for schedule  section.    She states that she is feeling well today.  She denies headache, vision changes, chest pain, shortness of breath, fever, chills, nausea, vomiting or other systemic complaints. She denies vaginal bleeding or loss of fluid and is feeling normal fetal movement.      Pregnancy complicated by:  -Gestational hypertension diagnosed on , HELLP labs wnl aside from creatinine of 1.08, UPC <0.3  -Donated kidney in , baseline creatinine 0.75 on , increased to 1.08 on  (yesterday)  -LGA fetus, EFW 98%tile at 36w, BPD, HC, AC all >99%tile  -Factor V Leiden heterozygosity, plan for Lovenox ppx x 6weeks postpartum    ROS: No headaches, vision changes, nausea, vomiting, fevers, chills, chest pain, SOB, abdominal pain, constipation, diarrhea, dysuria, changes in vaginal discharge or edema in extremities noted.     OBHX:   Obstetric History       T0      L0     SAB3   TAB0   Ectopic0   Multiple0   Live Births0       # Outcome Date GA Lbr Shashank/2nd Weight Sex Delivery Anes PTL Lv   5 Current            4 SAB 17           3 SAB 17           2 SAB 16           1 AB 10/31/15 6w1d    AB, MISSED   FD      Complications: Excessive Vaginal Bleeding          Past Medical History:   Diagnosis Date     Factor V Leiden (H)      Rhinitis, allergic        Past Surgical History:   Procedure Laterality Date     DILATION AND CURETTAGE SUCTION N/A 10/31/2015    Procedure: DILATION AND CURETTAGE SUCTION;  Surgeon: Agnes Wheeler MD;  Location: [facility] OR     EXTRACTION(S) DENTAL       NEPHRECTOMY  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   Current Outpatient Prescriptions on File Prior to Encounter:  aspirin 81 MG tablet  Take by mouth daily   Cholecalciferol (VITAMIN D-3 PO)    fluticasone (FLONASE) 50 MCG/ACT nasal spray Spray 1-2 sprays into both nostrils daily   FOLIC ACID PO Take 800 mcg by mouth daily   Hypertonic Nasal Wash (SINUS RINSE BOTTLE KIT NA) Spray 1 packet in nostril as needed   ketotifen (ZADITOR/REFRESH ANTI-ITCH) 0.025 % SOLN Place 1 drop into both eyes 2 times daily   mometasone (ELOCON) 0.1 % cream Apply topically daily   PRENATAL VITAMINS PO Take  by mouth daily.   psyllium (METAMUCIL) 58.6 % POWD Take by mouth daily   ranitidine (ZANTAC) 150 MG tablet Take 150 mg by mouth 2 times daily       Allergies   Allergen Reactions     Erythromycin      Penicillins      Sulfa Drugs      Cefdinir Hives and Rash       Family History   Problem Relation Age of Onset     C.A.D. Maternal Grandfather      Hypertension Maternal Grandfather      Diabetes Maternal Grandmother      KIDNEY DISEASE Father      transplant     Breast Cancer Maternal Aunt      Breast Cancer Maternal Aunt      Other Cancer Maternal Aunt      prancreatic ca     Breast Cancer Paternal Grandmother      Infertility Sister      currently pregnant wt IVF       SocialHX:   Social History   Substance Use Topics     Smoking status: Never Smoker     Smokeless tobacco: Never Used     Alcohol use No      Comment: atopped in pregnancy       ROS: 10-point ROS negative except as indicated in HPI.    Physical Exam:  Vitals:    08/21/18 0950   BP: (!) 140/102     General: alert, oriented female, resting in bed in NAD  CV: regular rate and rhythm, no murmurs  Lungs: clear bilaterally, no crackles or wheezes.   Abdomen: soft, gravid, non-tender, EFW 9.5#.  Extremities: bilateral lower extremities non-tender with 1+ edema  Neuro: 2+ patellar reflexes    SVE: Deferred  Membranes: intact    Presentation: Cephalic by Leopold's    FHT: baseline 125 bpm, moderate variability, 15x15 accelerations, no decelerations  [place]: Rare ctx    Prenatal ultrasounds:  Dating US  9w1d  Anatomy US 21w5d  Growth US x4    Prenatal Labs:   Lab Results   Component Value Date    ABO A 08/20/2018    RH Pos 08/20/2018    AS Neg 08/20/2018    HEPBANG Nonreactive 01/29/2018    CHPCRT  12/22/2011     Negative for C. trachomatis rRNA by transcription mediated amplification.   A negative result by transcription mediated amplification does not preclude the   presence of C. trachomatis infection because results are dependent on proper   and adequate collection, absence of inhibitors, and sufficient rRNA to be   detected.    GCPCRT  12/22/2011     Negative for N. gonorrhoeae rRNA by transcription mediated amplification.   A negative result by transcription mediated amplification does not preclude the   presence of N. gonorrhoeae infection because results are dependent on proper   and adequate collection, absence of inhibitors, and sufficient rRNA to be   detected.    TREPAB Negative 01/29/2018    HGB 11.6 (L) 08/20/2018    HIV Negative 11/10/2010       GBS Status:   Lab Results   Component Value Date    GBS Negative 08/06/2018       Lab Results   Component Value Date    PAP NIL 07/21/2015       Labs:   Results for orders placed or performed in visit on 08/20/18 (from the past 24 hour(s))   AST   Result Value Ref Range    AST 15 0 - 45 U/L   ALT   Result Value Ref Range    ALT 18 0 - 50 U/L   Creatinine   Result Value Ref Range    Creatinine 1.08 (H) 0.52 - 1.04 mg/dL    GFR Estimate 56 (L) >60 mL/min/1.7m2    GFR Estimate If Black 68 >60 mL/min/1.7m2   CBC with Platelets   Result Value Ref Range    WBC 12.6 (H) 4.0 - 11.0 10e9/L    RBC Count 3.83 3.8 - 5.2 10e12/L    Hemoglobin 11.6 (L) 11.7 - 15.7 g/dL    Hematocrit 34.2 (L) 35.0 - 47.0 %    MCV 89 78 - 100 fl    MCH 30.3 26.5 - 33.0 pg    MCHC 33.9 31.5 - 36.5 g/dL    RDW 13.5 10.0 - 15.0 %    Platelet Count 318 150 - 450 10e9/L   ABO/Rh Type and Screen   Result Value Ref Range    ABO A     RH(D) Pos     Antibody Screen Neg     Test Valid Only At           [facility]    Specimen Expires 2018    Protein  random urine with Creat Ratio   Result Value Ref Range    Protein Random Urine 0.28 g/L    Protein Total Urine g/gr Creatinine 0.23 (H) 0 - 0.2 g/g Cr   Creatinine urine calculation only   Result Value Ref Range    Creatinine Urine 121 mg/dL       Assessment: Ruma Vega is a 37 year old  at 38w3d by 9w1d US who presents for schedule  section.    Plan:  Admit to L&D for scheduled  section. Patient has been extensively counseled on IOL vs PLTCS for LGA fetus.   The risks, benefits, and alternatives of  section were discussed, including the risks of bleeding, infection, injury to surrounding organs, fetal injury, and remote risks of hysterectomy. She consented to a blood transfusion in the event of a life threatening amount of bleeding. She had time to ask questions and agreed to proceed. Surgical consent was signed.    Pain:  Spinal per anesthesia.   ID:  Metronidazole & Gentamycin for kylie-op antibiotics given cephalosporin/pencillin allergy of HIVES  FWB: Category I FHT.  Continue EFM.   PNC: Rh pos, Rubella immune, GBS neg, GCT passed, Placenta posterior  Fen/GI: NPO, IVFs.  PPH ppx: CBC, T&S.     Gestational hypertension: HELLP labs wnl aside from Cr of 1.08. UPC <0.3. Monitor BP's closely in postpartum period and repeat Creatinine on POD#1. No sx of preeclampsia at this time.  Will hold NSAIDS post-delivery until normal kidney function.    Factor V Leiden heterozygote: Lovenox 40 mg qday for prophylaxis starting 12-24 hours postop    The patient was discussed with Dr. Wheeler who is in agreement with the treatment plan.    # Pain Assessment:  Current Pain Score 2018   Patient currently in pain? denies   Pain location -   Pain descriptors -   Ruma s pain level was assessed and she currently denies pain.      Raisa Pinto MD  Ob/Gyn PGY-2  18 10:03 AM        Staff MD Note    I appreciate the note by Dr. Pinto.  Any necessary changes have been made by me.  I evaluated the patient with the resident and agree with the assessment and plan.    Agnes Wheeler MD

## 2018-08-21 NOTE — PLAN OF CARE
Patient is a  at 38.3 weeks here for primary C/Section for LGA, GHTN, Hx of kidney donation, and factor V leiden.  with accels. Denies contractions. BP elevated on admission to 140/102 and 155/97 , but denies headache ,visual changes and edema. NPO since  at . Consents signed. Labs in chart IV infusing.

## 2018-08-21 NOTE — IP AVS SNAPSHOT
[facility]    [address] [address]    Phone:  [phone]                                       After Visit Summary   8/21/2018    Ruma Vega    MRN: [id number]           After Visit Summary Signature Page     I have received my discharge instructions, and my questions have been answered. I have discussed any challenges I see with this plan with the nurse or doctor.    ..........................................................................................................................................  Patient/Patient Representative Signature      ..........................................................................................................................................  Patient Representative Print Name and Relationship to Patient    ..................................................               ................................................  Date                                            Time    ..........................................................................................................................................  Reviewed by Signature/Title    ...................................................              ..............................................  Date                                                            Time          22EPIC Rev 08/18

## 2018-08-21 NOTE — ANESTHESIA POSTPROCEDURE EVALUATION
Patient: Ruma Vega    Procedure(s):   - Wound Class: II-Clean Contaminated    Diagnosis:Primary gestational hypertension and LGA fetus  Diagnosis Additional Information: No value filed.    Anesthesia Type:  Spinal, Periph. Nerve Block for postop pain    Note:  Anesthesia Post Evaluation    Patient location during evaluation: PACU  Patient participation: Able to fully participate in evaluation  Level of consciousness: awake and alert  Pain management: satisfactory to patient  Airway patency: patent  Cardiovascular status: acceptable and stable  Respiratory status: acceptable and spontaneous ventilation  Hydration status: euvolemic  PONV: controlled     Anesthetic complications: None          Last vitals:  Vitals:    08/21/18 1320 08/21/18 1335 08/21/18 1350   BP: (!) 119/95 123/81 125/80   Resp: 14 22 24   Temp:      SpO2: 98% 100% 100%         Electronically Signed By: Artem Magallon MD  August 21, 2018  2:12 PM

## 2018-08-22 LAB
CREAT SERPL-MCNC: 0.92 MG/DL (ref 0.52–1.04)
GFR SERPL CREATININE-BSD FRML MDRD: 68 ML/MIN/1.7M2
HGB BLD-MCNC: 9.3 G/DL (ref 11.7–15.7)

## 2018-08-22 PROCEDURE — 36415 COLL VENOUS BLD VENIPUNCTURE: CPT | Performed by: OBSTETRICS & GYNECOLOGY

## 2018-08-22 PROCEDURE — 25000128 H RX IP 250 OP 636: Performed by: STUDENT IN AN ORGANIZED HEALTH CARE EDUCATION/TRAINING PROGRAM

## 2018-08-22 PROCEDURE — 25000132 ZZH RX MED GY IP 250 OP 250 PS 637: Performed by: STUDENT IN AN ORGANIZED HEALTH CARE EDUCATION/TRAINING PROGRAM

## 2018-08-22 PROCEDURE — 12000030 ZZH R&B OB INTERMEDIATE UMMC

## 2018-08-22 PROCEDURE — 85018 HEMOGLOBIN: CPT | Performed by: OBSTETRICS & GYNECOLOGY

## 2018-08-22 PROCEDURE — 82565 ASSAY OF CREATININE: CPT | Performed by: OBSTETRICS & GYNECOLOGY

## 2018-08-22 RX ORDER — AMOXICILLIN 250 MG
1 CAPSULE ORAL 2 TIMES DAILY PRN
Qty: 60 TABLET | Refills: 0 | Status: SHIPPED | OUTPATIENT
Start: 2018-08-22 | End: 2018-10-08

## 2018-08-22 RX ORDER — ACETAMINOPHEN 325 MG/1
650 TABLET ORAL EVERY 4 HOURS PRN
Qty: 100 TABLET | Refills: 0 | Status: SHIPPED | OUTPATIENT
Start: 2018-08-24 | End: 2018-10-08

## 2018-08-22 RX ORDER — NALOXONE HYDROCHLORIDE 0.4 MG/ML
.1-.4 INJECTION, SOLUTION INTRAMUSCULAR; INTRAVENOUS; SUBCUTANEOUS
Status: DISCONTINUED | OUTPATIENT
Start: 2018-08-22 | End: 2018-08-23

## 2018-08-22 RX ORDER — FERROUS SULFATE 325(65) MG
325 TABLET ORAL
Qty: 90 TABLET | Refills: 0 | Status: SHIPPED | OUTPATIENT
Start: 2018-08-22 | End: 2018-10-08

## 2018-08-22 RX ADMIN — FLUTICASONE PROPIONATE 2 SPRAY: 50 SPRAY, METERED NASAL at 14:43

## 2018-08-22 RX ADMIN — SENNOSIDES AND DOCUSATE SODIUM 2 TABLET: 8.6; 5 TABLET ORAL at 21:04

## 2018-08-22 RX ADMIN — ENOXAPARIN SODIUM 40 MG: 40 INJECTION SUBCUTANEOUS at 09:51

## 2018-08-22 RX ADMIN — ACETAMINOPHEN 975 MG: 325 TABLET, FILM COATED ORAL at 20:36

## 2018-08-22 RX ADMIN — SIMETHICONE CHEW TAB 80 MG 80 MG: 80 TABLET ORAL at 21:04

## 2018-08-22 RX ADMIN — SENNOSIDES AND DOCUSATE SODIUM 2 TABLET: 8.6; 5 TABLET ORAL at 08:15

## 2018-08-22 RX ADMIN — ACETAMINOPHEN 975 MG: 325 TABLET, FILM COATED ORAL at 12:47

## 2018-08-22 RX ADMIN — ACETAMINOPHEN 975 MG: 325 TABLET, FILM COATED ORAL at 04:45

## 2018-08-22 RX ADMIN — SODIUM CHLORIDE, SODIUM LACTATE, POTASSIUM CHLORIDE, CALCIUM CHLORIDE, AND DEXTROSE MONOHYDRATE: 600; 310; 30; 20; 5 INJECTION, SOLUTION INTRAVENOUS at 02:45

## 2018-08-22 RX ADMIN — KETOTIFEN FUMARATE 1 DROP: 0.35 SOLUTION/ DROPS OPHTHALMIC at 14:41

## 2018-08-22 RX ADMIN — RANITIDINE 150 MG: 150 TABLET ORAL at 20:36

## 2018-08-22 RX ADMIN — RANITIDINE 150 MG: 150 TABLET ORAL at 09:51

## 2018-08-22 NOTE — PLAN OF CARE
Problem: Patient Care Overview  Goal: Plan of Care/Patient Progress Review  Outcome: Therapy, progress toward functional goals as expected  Data: Vital signs within normal limits. Postpartum checks within normal limits - see flow record. Patient was nauseous/vomiting over the evening but that has resolved, pt drinking water and eating toast and crackers. Tubbs catheter in place, D5LR running at 125mL/hr overnight due to previous vomiting. Output adequate. No apparent signs of infection. Incision UTV. Patient breastfeeding with assist-needs help getting baby latched on but once baby is on, mother is comfortable with keeping baby at breast. Able to hand express. Lactation consult released due to pt hx of IVF.  Action: Patient medicated during the shift for pain. See MAR. Patient reassessed within 1 hour after each medication and pain was improved - patient stated she was comfortable. Patient education done about bulb syringe, hand expression, deep latch and pain management. See flow record.  Response: Positive attachment behaviors observed with infant. Support persons Nicloa present.   Plan: continue plan of care.

## 2018-08-22 NOTE — PLAN OF CARE
Problem: Patient Care Overview  Goal: Plan of Care/Patient Progress Review  Outcome: Improving  Postpartum stable. VSS. Nausea/emesis after transfer, but has improved with zofran and compazine. Tolerating solids and fluids. PIV infusing D5 at 125/hr. Tubbs has adequate output. Incision is c/d/i. Pain is tolerable with tylenol, aware that oxy is available if needed. Spouse in room for support. Continue plan of care.

## 2018-08-22 NOTE — PROGRESS NOTES
Post Partum Progress Note  POD#1  Subjective:  She is resting comfortably in bed this morning.  Complains of . Pain is improving and well controlled on current medication regimen. Nausea and vomiting improved, tolerating light PO intake.  Lochia present and similar to menses.  Tubbs catheter in place. Has not yet ambulated. Ambulating without dizziness or difficulty.  She denies headache, changes in vision, nausea/vomiting, chest pain, shortness of breath, RUQ pain, or worsening edema.  Plans to breast feed.    Objective:  Vitals:    18 1930 18 2026 18 0027 18 0442   BP: 131/82 122/68 116/73 111/73   Resp: 16 16 16 16   Temp: 97.7  F (36.5  C)  98  F (36.7  C) 98.2  F (36.8  C)   TempSrc: Oral  Oral Oral   SpO2: 100% 97% 95% 97%   Weight:       Height:           General: NAD. A&Ox3.  CV: RRR.  Pulm: CTAB. Normal respiratory effort.  Abd: Soft, non-tender, non-distended. Fundus is firm and below the umbilicus.   Incision: Bandage in place  Ext: +1 edema. No calf tenderness.    Intake/Output Summary (Last 24 hours) at 18 0523  Last data filed at 18 0044   Gross per 24 hour   Intake             2892 ml   Output             2987 ml   Net              -95 ml     # Pain Assessment:  Current Pain Score 2018   Patient currently in pain? denies   Pain location -   Pain descriptors -   - Ruma is experiencing pain due to postoperative state. Pain management was discussed and the plan was created in a collaborative fashion.  Ruma's response to the current recommendations: engaged  - pain well controlled. Tylenol & Roxicodone ordered    Assessment/Plan:  Ruma Vega is a 37 year old  at 38w3d by 9w1d US POD#1 s/p PLTCS for LGA fetus and gestational HTN. Patient s/p kidney donation. Cr increased to 1.08 on admission. 0.92 this AM. Improving. NSAIDs held.     #Gestational hypertension:  -HELLP labs WNL except for elevated Cr and UPC <0.3.  -BP range  111-138/62-88  -UOP adequate    #Factor V Leiden heterozygote:   -Lovenox 40 mg qday for 6 weeks for prophylaxis starting 12 hours postop    - Encourage routine post-operative goals including ambulation and incentive spirometry  - PNC: Rh pos. Rubella immune. No intervention indicated.  - Pain: controlled on oral medications  - Heme: Hgb 11.6> QBL 1218cc> 9.3.  If <10 will discharge home with iron.  - GI: continue anti-emetics and stool softeners as needed.  - : Tubbs in place. Remove this a.m. F/u TOV  - Infant: Stable in room  - Feeding: Plans on breast feeding.  - BC: To be discussed    Discharge to home on POD#3-4     Alicia Myhre,   OB/GYN, PGY-2    [facility] staff:  Appreciate note by Dr. Myhre.  I have seen and examined the patient without the resident. I have reviewed, edited, and agree with the note.        Deanna Cabezas MD, FACOG  8/22/2018  9:44 AM

## 2018-08-22 NOTE — PLAN OF CARE
Problem: Patient Care Overview  Goal: Plan of Care/Patient Progress Review  Outcome: Improving  Data: Vital signs within normal limits. Postpartum checks within normal limits - see flow record. Patient eating and drinking normally. Patient able to empty bladder independently and is up ambulating. No apparent signs of infection. Incision healing well. Patient performing self cares and is able to care for infant.  Action: Patient medicated during the shift for pain. See MAR. Patient reassessed within 1 hour after each medication and pain was improved - patient stated she was comfortable. Patient education done about breast care, gave lanolin cream for sore left nipple, pain control, incision care. See flow record.  Response: Positive attachment behaviors observed with infant. Support persons are present.   Plan: Anticipate discharge on Friday.

## 2018-08-22 NOTE — PHARMACY
Prescriber Notification Note    The pharmacist has communicated with this patient's provider regarding a concern or therapy recommendation.    Notified Person: Dr. Pinto  Date/Time of Notification: 8/22/18 1751  Interaction: phone  Concern/Recommendation:  Lovenox ordered. Due now. Pt had spinal anesthesia. Usually Lovenox ordered 22-24 hours post spinal anesthesia.     Comments/Additional Details:  Would like pt to receive now due to being high risk for clotting.

## 2018-08-22 NOTE — PLAN OF CARE
Problem: Patient Care Overview  Goal: Plan of Care/Patient Progress Review  Outcome: Improving  Pt vital signs stable and assessment findings within normal. Fundal assessment WDL. Incision dressing is clean, dry, and intact and patient plans to remove dressing in shower this afternoon. Tubbs catheter was removed at 1045, awaiting first void. Fluids have been encouraged. Patient is ambulating throughout the room independently. Nausea symptoms were relieved with zantac see eMAR. Patient is bonding well with baby and attentive to infant's cues. Breastfeeding well and deep latch was verified with patient and spouse. Pain is managed with Tylenol.

## 2018-08-23 PROCEDURE — 12000028 ZZH R&B OB UMMC

## 2018-08-23 PROCEDURE — 25000128 H RX IP 250 OP 636: Performed by: STUDENT IN AN ORGANIZED HEALTH CARE EDUCATION/TRAINING PROGRAM

## 2018-08-23 PROCEDURE — 25000132 ZZH RX MED GY IP 250 OP 250 PS 637: Performed by: STUDENT IN AN ORGANIZED HEALTH CARE EDUCATION/TRAINING PROGRAM

## 2018-08-23 RX ORDER — SIMETHICONE 80 MG
80 TABLET,CHEWABLE ORAL 4 TIMES DAILY
Status: DISCONTINUED | OUTPATIENT
Start: 2018-08-23 | End: 2018-08-24 | Stop reason: HOSPADM

## 2018-08-23 RX ADMIN — KETOTIFEN FUMARATE 1 DROP: 0.35 SOLUTION/ DROPS OPHTHALMIC at 21:25

## 2018-08-23 RX ADMIN — RANITIDINE 150 MG: 150 TABLET ORAL at 19:39

## 2018-08-23 RX ADMIN — SIMETHICONE CHEW TAB 80 MG 80 MG: 80 TABLET ORAL at 07:43

## 2018-08-23 RX ADMIN — ENOXAPARIN SODIUM 40 MG: 40 INJECTION SUBCUTANEOUS at 09:05

## 2018-08-23 RX ADMIN — ACETAMINOPHEN 975 MG: 325 TABLET, FILM COATED ORAL at 12:48

## 2018-08-23 RX ADMIN — SENNOSIDES AND DOCUSATE SODIUM 2 TABLET: 8.6; 5 TABLET ORAL at 19:39

## 2018-08-23 RX ADMIN — ACETAMINOPHEN 975 MG: 325 TABLET, FILM COATED ORAL at 20:42

## 2018-08-23 RX ADMIN — SIMETHICONE CHEW TAB 80 MG 80 MG: 80 TABLET ORAL at 11:36

## 2018-08-23 RX ADMIN — Medication 0.52 G: at 09:02

## 2018-08-23 RX ADMIN — RANITIDINE 150 MG: 150 TABLET ORAL at 07:43

## 2018-08-23 RX ADMIN — ACETAMINOPHEN 975 MG: 325 TABLET, FILM COATED ORAL at 04:39

## 2018-08-23 RX ADMIN — SENNOSIDES AND DOCUSATE SODIUM 2 TABLET: 8.6; 5 TABLET ORAL at 07:41

## 2018-08-23 RX ADMIN — SIMETHICONE CHEW TAB 80 MG 80 MG: 80 TABLET ORAL at 19:39

## 2018-08-23 NOTE — PLAN OF CARE
Problem: Patient Care Overview  Goal: Plan of Care/Patient Progress Review  Outcome: Improving  Pt. Vital signs and assessment findings within normal. Fundal assessment firm and midline. Incision is open to air w/ steri strips w/ mild redness on the left side. Patient is up ad sabas and ambulating in hallway throughout the shift. Pt. Is passing gas and has had two bowel movements. Pain is controlled by Tylenol. Pt is breastfeeding well and is supported by spouse. Patient is bonding well with  and attentive to cues. Pt completed the birth certificate and scored a 0 on her [place].

## 2018-08-23 NOTE — PROGRESS NOTES
"Post Partum Progress Note  POD#2  Subjective:  She is resting comfortably in bed this morning. Had significant gas pain yesterday that improved somewhat after taking Simethicone. Was able to pass flatus x2 yesterday. Her incisional pain is mild and \"burning.\" She is only taking tylenol as she is hoping to avoid oxycodone. Tolerating PO intake.  Lochia present and similar to menses.  Voiding without difficulty. Ambulating without dizziness or difficulty.  She denies headache, changes in vision, nausea/vomiting, chest pain, shortness of breath, RUQ pain, or worsening edema.  Breast feeding without difficulty    Objective:  Vitals:    18 0027 18 0442 18 0817 18 1600   BP: 116/73 111/73 133/69 122/78   Resp: 16 16 17 16   Temp: 98  F (36.7  C) 98.2  F (36.8  C) 97.9  F (36.6  C) 98.1  F (36.7  C)   TempSrc: Oral Oral Oral Oral   SpO2: 95% 97% 97%    Weight:       Height:           General: NAD. A&Ox3.  CV: RRR.  Pulm: CTAB. Normal respiratory effort.  Abd: Soft, non-tender, non-distended. Fundus is firm and below the umbilicus.    Incision c/d/i. Just above the incision the skin is faintly erythematous and warm to touch with mild edema. No drainage or other evidence of infection.   Ext: Trace edema. No calf tenderness.    # Pain Assessment:  Current Pain Score 2018   Patient currently in pain? yes   Pain location Abdomen   Pain descriptors Aching   - Ruma is experiencing pain due to  section. Pain management was discussed and the plan was created in a collaborative fashion.  Ruma's response to the current recommendations: engaged  - Please see the plan for pain management as documented    Assessment/Plan:  Ruma Vega is a 37 year old  at 38w3d by 9w1d US POD#2 s/p PLTCS for LGA fetus and gestational HTN. Patient s/p kidney donation. Cr increased to 1.08 on admission. 0.92 on POD#1. Improving. NSAIDs held.     #Gestational hypertension:  -HELLP labs WNL except " for elevated Cr - resolved and UPC <0.3.  -BP normotensive  -UOP adequate     #Factor V Leiden heterozygote:   -Lovenox 40 mg qday for 6 weeks for prophylaxis started on POD#1     - Encourage routine post-operative goals including ambulation and incentive spirometry  - PNC: Rh pos. Rubella immune. No intervention indicated.  - Pain: controlled with Acetaminophen. Roxicodone ordered PRN but patient has not required any. NSAIDS held.   - Heme: Hgb 11.6> QBL 1218cc> 9.3. Will discharge home with iron.  - GI: continue anti-emetics and stool softeners as needed. Scheduled simethicone and Metamucil ordered today per patient request.  - : Voiding spontaneously  - Infant: Stable in room  - Feeding: Breast feeding.  - BC: Declines at this time, considering Mirena     Anticipate discharge to home on POD#3     Raisa Pinto MD  Ob/Gyn PGY-2  08/23/18 6:51 AM      Appreciate note by Dr. Pinto. Patient has been seen and examined by me separate from the resident, agree with above note.     Ysabel Saenz MD  11:35 AM

## 2018-08-23 NOTE — PLAN OF CARE
Problem: Patient Care Overview  Goal: Plan of Care/Patient Progress Review  Outcome: No Change  Resumed care at 1500, no issues thus far. Currently patient sleeping.

## 2018-08-23 NOTE — LACTATION NOTE
This note was copied from a baby's chart.  Consult for first time breastfeeding    Ruma is a 37 year old  who delivered her baby, Jose Manuel, at 38.3 weeks via  on 18 at 1149. Her health history includes AMA, Factor V Leiden, gestational hypertension and she was a kidney donor for her father in . She noted breast growth in early pregnancy and has been able to hand express colostrum. She believes her breasts feel reed today. Her breasts are symmetrical and pendulous with bilateral intact, everted nipples. Ruma has been positioning and latching with help from her  and her bedside RN's. She denies discomfort when breastfeeding except for the first few seconds after latching on.     Baby Jose Manuel has age appropriate output, a low risk bili at 29 hours of age and weight loss of -7.9% of his birthweight at 48 hours. He has a normal oral exam.    Jose Manuel was cueing to feed and I was able to help Ruma position him in the football position while she was sitting in the chair. She was able to latch him comfortably without assistance. Jose Manuel was heard swallowing while at the breast and he came off asleep.    Ruam plans to have a home visit from an IBCLC that was recommended by a friend and she will follow up with [facility] for pediatric care.    Reviewed: early feeding cues, feeding on cue, breastfeeding positions, signs breastfeeding is going well, satiety cues, expected  output, benefits of skin to skin and hand expression to support her milk supply, inpatient lactation support and outpatient lactation resources.    Plan: Continue to breastfeed on cue and encourage hand expression after feedings. Encourage skin to skin between feedings. Ruma plans to have a lactation consultant come to her house for an outpatient visit on Saturday.

## 2018-08-23 NOTE — PLAN OF CARE
Problem: Patient Care Overview  Goal: Plan of Care/Patient Progress Review  Data: Vital signs within normal limits. Postpartum checks within normal limits - see flow record. Patient eating and drinking normally. Patient able to empty bladder independently and is up ambulating. No apparent signs of infection. Incision healing well. Patient performing self cares and is able to care for infant.  Distended but passing gas.  Action: Patient medicated during the shift for pain and cramping. See MAR. Patient reassessed within 1 hour after each medication and pain was improved - patient stated she was comfortable. Patient education done about hand expression, breastfeeding assistance with latch and positioning. See flow record.  Response: Positive attachment behaviors observed with infant. Support persons  Nicola present.   Plan: Anticipate discharge tomorrow.

## 2018-08-24 VITALS
OXYGEN SATURATION: 97 % | HEIGHT: 69 IN | WEIGHT: 267 LBS | DIASTOLIC BLOOD PRESSURE: 87 MMHG | TEMPERATURE: 98.2 F | HEART RATE: 78 BPM | BODY MASS INDEX: 39.55 KG/M2 | SYSTOLIC BLOOD PRESSURE: 147 MMHG | RESPIRATION RATE: 21 BRPM

## 2018-08-24 PROCEDURE — 25000132 ZZH RX MED GY IP 250 OP 250 PS 637: Performed by: STUDENT IN AN ORGANIZED HEALTH CARE EDUCATION/TRAINING PROGRAM

## 2018-08-24 RX ADMIN — ACETAMINOPHEN 975 MG: 325 TABLET, FILM COATED ORAL at 04:35

## 2018-08-24 NOTE — PLAN OF CARE
Problem: Patient Care Overview  Goal: Plan of Care/Patient Progress Review  Data: Vital signs within normal limits. Postpartum checks within normal limits - see flow record. Patient eating and drinking normally. Patient able to empty bladder independently and is up ambulating. No apparent signs of infection. Incision healing well. Patient performing self cares and is able to care for infant. Has questions about starting to pump once getting ready to go back to work.  Action: Patient medicated during the shift for pain, specifically more bothersome in her R hip and R leg.  Hot packs brought to bedside for R hip pain. See MAR. Patient reassessed within 1 hour after each medication and pain was improved - patient stated she was comfortable. Patient education done about hand expression after feeding and spoon feeding back to infant, skin to skin benefits and pain management options. See flow record.  Response: Positive attachment behaviors observed with infant. Support persons  present.   Plan: Anticipate discharge today.

## 2018-08-24 NOTE — PROGRESS NOTES
Post Partum Progress Note  POD#3  Subjective:  She is resting comfortably in bed this morning. Gas pain has improved since yesterday. Her incisional pain is also improved. She complains of some right sided back and leg pain that were present prior to delivery. She is only taking tylenol. Tolerating PO intake.  Lochia present and similar to menses.  Voiding without difficulty. Ambulating without dizziness or difficulty.  She denies headache, changes in vision, nausea/vomiting, chest pain, shortness of breath, RUQ pain, or worsening edema.  Breast feeding without difficulty    Objective:  Vitals:    18 0016 18 0731 18 1530 18 2354   BP: 135/84 133/89 132/78 (!) 138/91   Pulse: 85 83 85 78   Resp:  18   Temp: 98.4  F (36.9  C) 98.2  F (36.8  C) 97.8  F (36.6  C) 97.8  F (36.6  C)   TempSrc: Oral Oral Oral Oral   SpO2:       Weight:       Height:           General: NAD. A&Ox3.  CV: RRR.  Pulm: CTAB. Normal respiratory effort.  Abd: Soft, non-tender, non-distended. Fundus is firm and below the umbilicus.    Incision c/d/i. skin faintly erythematous with mild edema superior to incision. No drainage or other evidence of infection. Mild bruising on superior right side of incision.   Ext: Trace edema. No calf tenderness.  MSK: Right piriformis tender to palpation. Right paraspinals in thoracolumbar region hypertonic.     # Pain Assessment:  Current Pain Score 2018   Patient currently in pain? denies   Pain location -   Pain descriptors -   - Ruma is experiencing pain due to  section. Pain management was discussed and the plan was created in a collaborative fashion.  Ruma's response to the current recommendations: engaged  - Please see the plan for pain management as documented    Assessment/Plan:  Ruma Vega is a 37 year old  at 38w3d by 9w1d US POD#3 s/p PLTCS for LGA fetus and gestational HTN. Patient s/p kidney donation. Cr increased to 1.08 on  admission. 0.92 on POD#1. Improving. NSAIDs held.     #Gestational hypertension:  -HELLP labs WNL except for elevated Cr - resolved and UPC <0.3.  -BP normotensive. Patient desires to monitor BP at home following discharge. Preeclampsia precautions provided.   -UOP adequate     #Factor V Leiden heterozygote:   -Lovenox 40 mg qday for 6 weeks for prophylaxis started on POD#1     - Encourage routine post-operative goals including ambulation and incentive spirometry  - PNC: Rh pos. Rubella immune. No intervention indicated.  - Pain: controlled with Acetaminophen. Roxicodone ordered PRN but patient has not required any. NSAIDS held.   - Heme: acute blood loss anemia expected for surgery: Hgb 11.6> QBL 1218cc> 9.3. Will discharge home with iron.  - GI: continue anti-emetics and stool softeners as needed. Scheduled simethicone and Metamucil ordered today per patient request.  - : Voiding spontaneously  - Infant: Stable in room  - Feeding: Breast feeding.  - BC: Declines at this time, considering Mirena  - MSK pain: discussed stretches that may be performed when healed from surgery. Also recommend foam roller when healed. If leg weakness or change in sensation occurs recommend further evaluation.      Anticipate discharge to home on POD#3     Alicia Myhre, DO  OB/GYN, PGY-2      [facility] staff:  Appreciate note by Dr. Myhre.  I have seen and examined the patient without the resident. I have reviewed, edited, and agree with the note.      Discussed with patient precautions for PreE and recommend RTC 1 week for BP check but sooner if any symptoms. She also has BP cuff at home, given parameters to come in immediately if BP >160/110. Patient verbalized understanding.     Also recommended continue Lovenox for 6 weeks pp.     Maren Mcgovern MD  8/24/2018  8:36 AM

## 2018-08-24 NOTE — LACTATION NOTE
This note was copied from a baby's chart.  Follow up consult    Ruma feels that she is getting more comfortable with positioning and latching Jose Manuel at the breast. Her breasts feel reed today, she has noticed an increase in amount she can hand express and the amount of swallowing Jose Manuel is doing at the breast. Jose Manuel has good output and his stool has transitioned. They are planning on discharging to home today and will have a lactation visit at their home tomorrow.    Reviewed: breastpumps, pumping recommendations for when back to work, homecare, signs feedings are going well and outpatient lactation support.    Plan: Family discharging to home. They plan to have a home lactation consult tomorrow, and will have a homecare RN visit. They will continue in home lactation support as needed.

## 2018-08-24 NOTE — PLAN OF CARE
Problem: Patient Care Overview  Goal: Plan of Care/Patient Progress Review  Outcome: Improving  Data: Vital signs stable, assessments within normal limits. No signs of infection noted. Patient discharge information given and  instructed of signs/symptoms to look for and report per discharge instructions.   Discharge outcomes on care plan met. No apparent pain.  Action: Review of care plan, teach back, and discharge instructions done with patient. verification with signature obtained.   Response:  Patient states understanding and comfort with self cares. All questions about self care addressed. patient discharged at 1000, with infant.

## 2018-08-25 LAB — COPATH REPORT: NORMAL

## 2018-08-27 ENCOUNTER — OFFICE VISIT (OUTPATIENT)
Dept: OBGYN | Facility: CLINIC | Age: 38
End: 2018-08-27
Attending: OBSTETRICS & GYNECOLOGY
Payer: COMMERCIAL

## 2018-08-27 VITALS — HEART RATE: 91 BPM | SYSTOLIC BLOOD PRESSURE: 146 MMHG | DIASTOLIC BLOOD PRESSURE: 97 MMHG | HEIGHT: 69 IN

## 2018-08-27 DIAGNOSIS — N36.8 URETHRAL IRRITATION: ICD-10-CM

## 2018-08-27 DIAGNOSIS — O13.3 PREGNANCY-INDUCED HYPERTENSION IN THIRD TRIMESTER: Primary | ICD-10-CM

## 2018-08-27 LAB
ALBUMIN UR-MCNC: NEGATIVE MG/DL
ALT SERPL W P-5'-P-CCNC: 35 U/L (ref 0–50)
APPEARANCE UR: CLEAR
AST SERPL W P-5'-P-CCNC: 28 U/L (ref 0–45)
BACTERIA #/AREA URNS HPF: ABNORMAL /HPF
BILIRUB UR QL STRIP: NEGATIVE
COLOR UR AUTO: ABNORMAL
CREAT SERPL-MCNC: 0.95 MG/DL (ref 0.52–1.04)
CREAT UR-MCNC: 83 MG/DL
ERYTHROCYTE [DISTWIDTH] IN BLOOD BY AUTOMATED COUNT: 13.2 % (ref 10–15)
GFR SERPL CREATININE-BSD FRML MDRD: 66 ML/MIN/1.7M2
GLUCOSE UR STRIP-MCNC: NEGATIVE MG/DL
HCT VFR BLD AUTO: 30 % (ref 35–47)
HGB BLD-MCNC: 10 G/DL (ref 11.7–15.7)
HGB UR QL STRIP: ABNORMAL
KETONES UR STRIP-MCNC: NEGATIVE MG/DL
LEUKOCYTE ESTERASE UR QL STRIP: NEGATIVE
MCH RBC QN AUTO: 29.7 PG (ref 26.5–33)
MCHC RBC AUTO-ENTMCNC: 33.3 G/DL (ref 31.5–36.5)
MCV RBC AUTO: 89 FL (ref 78–100)
MUCOUS THREADS #/AREA URNS LPF: PRESENT /LPF
NITRATE UR QL: NEGATIVE
PH UR STRIP: 6 PH (ref 5–7)
PLATELET # BLD AUTO: 453 10E9/L (ref 150–450)
PROT UR-MCNC: <0.05 G/L
PROT/CREAT 24H UR: NORMAL G/G CR (ref 0–0.2)
RBC # BLD AUTO: 3.37 10E12/L (ref 3.8–5.2)
RBC #/AREA URNS AUTO: 1 /HPF (ref 0–2)
SOURCE: ABNORMAL
SP GR UR STRIP: 1.01 (ref 1–1.03)
SQUAMOUS #/AREA URNS AUTO: 4 /HPF (ref 0–1)
UROBILINOGEN UR STRIP-MCNC: NORMAL MG/DL (ref 0–2)
WBC # BLD AUTO: 8.6 10E9/L (ref 4–11)
WBC #/AREA URNS AUTO: 1 /HPF (ref 0–5)

## 2018-08-27 PROCEDURE — 84156 ASSAY OF PROTEIN URINE: CPT | Performed by: OBSTETRICS & GYNECOLOGY

## 2018-08-27 PROCEDURE — 84460 ALANINE AMINO (ALT) (SGPT): CPT | Performed by: OBSTETRICS & GYNECOLOGY

## 2018-08-27 PROCEDURE — 84450 TRANSFERASE (AST) (SGOT): CPT | Performed by: OBSTETRICS & GYNECOLOGY

## 2018-08-27 PROCEDURE — 87086 URINE CULTURE/COLONY COUNT: CPT | Performed by: OBSTETRICS & GYNECOLOGY

## 2018-08-27 PROCEDURE — 85027 COMPLETE CBC AUTOMATED: CPT | Performed by: OBSTETRICS & GYNECOLOGY

## 2018-08-27 PROCEDURE — G0463 HOSPITAL OUTPT CLINIC VISIT: HCPCS

## 2018-08-27 PROCEDURE — 81001 URINALYSIS AUTO W/SCOPE: CPT | Performed by: OBSTETRICS & GYNECOLOGY

## 2018-08-27 PROCEDURE — 36415 COLL VENOUS BLD VENIPUNCTURE: CPT | Performed by: OBSTETRICS & GYNECOLOGY

## 2018-08-27 PROCEDURE — 82565 ASSAY OF CREATININE: CPT | Performed by: OBSTETRICS & GYNECOLOGY

## 2018-08-27 RX ORDER — NIFEDIPINE 30 MG/1
30 TABLET, EXTENDED RELEASE ORAL DAILY
Qty: 30 TABLET | Refills: 0 | Status: SHIPPED | OUTPATIENT
Start: 2018-08-27 | End: 2018-10-08

## 2018-08-27 NOTE — MR AVS SNAPSHOT
After Visit Summary   8/27/2018    Ruma Vega    MRN: [id number]           Patient Information     Date Of Birth          1980        Visit Information        Provider Department      8/27/2018 4:00 PM Agnes Wheeler MD [facility]        Today's Diagnoses     Pregnancy-induced hypertension in third trimester    -  1    Urethral irritation           Follow-ups after your visit        Your next 10 appointments already scheduled     Sep 05, 2018 11:30 AM CDT   Return Visit with Agnes Wheeler MD   [facility] ([facility])    [facility]  [address]  [address]  [facility] [id number] [phone]              Who to contact     Please call your clinic at [phone] to:    Ask questions about your health    Make or cancel appointments    Discuss your medicines    Learn about your test results    Speak to your doctor            Additional Information About Your Visit        MyChart Information     [employer] gives you secure access to your electronic health record. If you see a primary care provider, you can also send messages to your care team and make appointments. If you have questions, please call your primary care clinic.  If you do not have a primary care provider, please call [phone] and they will assist you.      [employer] is an electronic gateway that provides easy, online access to your medical records. With [employer], you can request a clinic appointment, read your test results, renew a prescription or communicate with your care team.     To access your existing account, please contact your [facility] or call [phone] for assistance.        Care EveryWhere ID     This is your Care EveryWhere ID. This could be used by other organizations to access your [place] medical records  [id number]        Your Vitals Were     Pulse Height                91 1.753 m  "(5' 9\")           Blood Pressure from Last 3 Encounters:   08/27/18 (!) 146/97   08/24/18 147/87   08/21/18 130/86    Weight from Last 3 Encounters:   08/21/18 121.1 kg (267 lb)   08/20/18 121.5 kg (267 lb 14.4 oz)   08/13/18 120.3 kg (265 lb 3.2 oz)              We Performed the Following     ALT     AST     CBC with Platelets     Creatinine urine calculation only     Creatinine     Protein  random urine with Creat Ratio     Routine UA with Microscopic     Urine Culture Aerobic Bacterial          Today's Medication Changes          These changes are accurate as of 8/27/18 11:59 PM.  If you have any questions, ask your nurse or doctor.               Start taking these medicines.        Dose/Directions    NIFEdipine ER osmotic 30 MG 24 hr tablet   Commonly known as:  PROCARDIA XL   Used for:  Pregnancy-induced hypertension in third trimester   Started by:  Agnes Wheeler MD        Dose:  30 mg   Take 1 tablet (30 mg) by mouth daily   Quantity:  30 tablet   Refills:  0            Where to get your medicines      These medications were sent to [facility]/pharmacy #[id number] - [place], [place] - [address] AT [address], [facility] [address]     Phone:  [phone]     NIFEdipine ER osmotic 30 MG 24 hr tablet                Primary Care Provider Office Phone # Fax #    Shannon Jerri Holder [phone]       [facility] [address] [facility] [address]        Equal Access to Services     [facility]: Franck Parks, wadanielle thompson, qaybta kaalmada carlos, luba garcia. So [facility] [phone].    ATENCIÓN: Si habla español, tiene a figueroa disposición servicios gratuitos de asistencia lingüística. Avila al [phone].    We comply with applicable federal civil rights laws and [place] laws. We do not discriminate on the basis of race, color, national origin, age, disability, sex, sexual orientation, or gender " identity.            Thank you!     Thank you for choosing [facility]  for your care. Our goal is always to provide you with excellent care. Hearing back from our patients is one way we can continue to improve our services. Please take a few minutes to complete the written survey that you may receive in the mail after your visit with us. Thank you!             Your Updated Medication List - Protect others around you: Learn how to safely use, store and throw away your medicines at www.disposemymeds.org.          This list is accurate as of 18 11:59 PM.  Always use your most recent med list.                   Brand Name Dispense Instructions for use Diagnosis    acetaminophen 325 MG tablet    TYLENOL    100 tablet    Take 2 tablets (650 mg) by mouth every 4 hours as needed for pain    S/P  section       aspirin 81 MG tablet      Take by mouth daily        enoxaparin 40 MG/0.4ML injection    LOVENOX    14.4 mL    Inject 0.4 mLs (40 mg) Subcutaneous every 24 hours Inject 40mg (0.4mls) every 24 hours until 2018    Factor V Leiden (H)       ferrous sulfate 325 (65 Fe) MG tablet    IRON    90 tablet    Take 1 tablet (325 mg) by mouth daily with food    Anemia due to blood loss, acute       FLONASE 50 MCG/ACT spray   Generic drug:  fluticasone     1 Package    Spray 1-2 sprays into both nostrils daily    Allergic rhinitis       FOLIC ACID PO      Take 800 mcg by mouth daily        ketotifen 0.025 % Soln ophthalmic solution    ZADITOR/REFRESH ANTI-ITCH     Place 1 drop into both eyes 2 times daily        mometasone 0.1 % cream    ELOCON     Apply topically daily        NIFEdipine ER osmotic 30 MG 24 hr tablet    PROCARDIA XL    30 tablet    Take 1 tablet (30 mg) by mouth daily    Pregnancy-induced hypertension in third trimester       PRENATAL VITAMINS PO      Take  by mouth daily.    Flu vaccine need       psyllium 58.6 % Powd    METAMUCIL     Take by mouth daily        ranitidine 150  MG tablet    ZANTAC     Take 150 mg by mouth 2 times daily        senna-docusate 8.6-50 MG per tablet    SENOKOT-S;PERICOLACE    60 tablet    Take 1 tablet by mouth 2 times daily as needed for constipation    S/P  section       SINUS RINSE BOTTLE KIT NA      Spray 1 packet in nostril as needed        VITAMIN D-3 PO

## 2018-08-27 NOTE — LETTER
"8/27/2018       RE: Ruma Vega  [address] [place] [address]     Dear Colleague,    Thank you for referring your patient, Ruma Vega, to the [facility] at [facility]. Please see a copy of my visit note below.    Acute Post-partum Check  8/27/18    Reason for visit: BP check    S: Patient is a 38 yo  s/p PLTCS on 8/21/18 at 38w3d for new onset gestational hypertension and concern for LGA.  Patient's pregnancy also complicated by history of Factor V Leiden heterozygosity and history of kidney donation to her father.  Patient's post-partum course in the hospital was uncomplicated with normo-mild BP range.  No signs/symptoms of preeclampsia.  Patient today states recovery really has been very good.  Pain well controlled with Tylenol alone and feels this is sufficient.  Minimal lochia.  Tolerating regular diet.  Does feel like there is discomfort with urination and remembers having this also after her kidney donation surgery and thinks likely just urethral sensitivity and irritation.  Denies dysuria, frequency or hesitancy.  Does have some constipation which she is treating with Metamucil and Senna.  Jose Manuel is doing well.  Had good lactation support immediately postpartum and felt this was incredibly helpful.  Currently offering nipple, then shield then pumping and repeating before offering breastmilk from bottle.  Does feel she has good supply.  Is taking daily Lovenox and this is going fine.  Is tired with adjustment to baby in house but feels this is to be expected.  She denies HA, scotoma, SOB, RUQ pain.  Has had some waxing and waning of swelling but this is related to her activity level.    O:  Vitals:    08/27/18 1600 08/27/18 1608   BP: (!) 155/100 (!) 146/97   Pulse: 92 91   Height: 1.753 m (5' 9\")      General: NAD, A&Ox3  Resp: Non-labored breathing  Extremities: 1+ edema bilaterally    A/P: 38 yo  POD#6 s/p PLTCS " for gestational hypertension and LGA in the face of history of kidney donation with change in creatinine from 0.75 baseline to 1.08  1) Gestational Hypertension: Given patient's BP range in clinic today, would recommend initiation of Nifedipine 30 mg XL daily at this time to ensure appropriate BP control.  Will also repeat HELLP labs to ensure no significant change.  Patient is aware of signs/symptoms of preeclampsia and when to call.  She does have BP machine at home and aware to call if BP > 160/105.  Plan for RTC in 1 week for BP check again, earlier with any other concerns.  She understands and is agreeable with this plan.  2) Urinary discomfort: Likely secondary to healing however will get UA/UCx today to ensure no infection      Again, thank you for allowing me to participate in the care of your patient.      Sincerely,    Agnes Wheeler MD

## 2018-08-28 LAB
BACTERIA SPEC CULT: NORMAL
BACTERIA SPEC CULT: NORMAL
Lab: NORMAL
SPECIMEN SOURCE: NORMAL

## 2018-08-28 NOTE — PROGRESS NOTES
"Acute Post-partum Check  8/27/18    Reason for visit: BP check    S: Patient is a 36 yo  s/p PLTCS on 8/21/18 at 38w3d for new onset gestational hypertension and concern for LGA.  Patient's pregnancy also complicated by history of Factor V Leiden heterozygosity and history of kidney donation to her father.  Patient's post-partum course in the hospital was uncomplicated with normo-mild BP range.  No signs/symptoms of preeclampsia.  Patient today states recovery really has been very good.  Pain well controlled with Tylenol alone and feels this is sufficient.  Minimal lochia.  Tolerating regular diet.  Does feel like there is discomfort with urination and remembers having this also after her kidney donation surgery and thinks likely just urethral sensitivity and irritation.  Denies dysuria, frequency or hesitancy.  Does have some constipation which she is treating with Metamucil and Senna.  Jose Manuel is doing well.  Had good lactation support immediately postpartum and felt this was incredibly helpful.  Currently offering nipple, then shield then pumping and repeating before offering breastmilk from bottle.  Does feel she has good supply.  Is taking daily Lovenox and this is going fine.  Is tired with adjustment to baby in house but feels this is to be expected.  She denies HA, scotoma, SOB, RUQ pain.  Has had some waxing and waning of swelling but this is related to her activity level.    O:  Vitals:    08/27/18 1600 08/27/18 1608   BP: (!) 155/100 (!) 146/97   Pulse: 92 91   Height: 1.753 m (5' 9\")      General: NAD, A&Ox3  Resp: Non-labored breathing  Extremities: 1+ edema bilaterally    A/P: 36 yo  POD#6 s/p PLTCS for gestational hypertension and LGA in the face of history of kidney donation with change in creatinine from 0.75 baseline to 1.08  1) Gestational Hypertension: Given patient's BP range in clinic today, would recommend initiation of Nifedipine 30 mg XL daily at this time to ensure appropriate BP " control.  Will also repeat HELLP labs to ensure no significant change.  Patient is aware of signs/symptoms of preeclampsia and when to call.  She does have BP machine at home and aware to call if BP > 160/105.  Plan for RTC in 1 week for BP check again, earlier with any other concerns.  She understands and is agreeable with this plan.  2) Urinary discomfort: Likely secondary to healing however will get UA/UCx today to ensure no infection    Agnes Wheeler MD

## 2018-09-05 ENCOUNTER — OFFICE VISIT (OUTPATIENT)
Dept: OBGYN | Facility: CLINIC | Age: 38
End: 2018-09-05
Attending: OBSTETRICS & GYNECOLOGY
Payer: COMMERCIAL

## 2018-09-05 VITALS — DIASTOLIC BLOOD PRESSURE: 88 MMHG | SYSTOLIC BLOOD PRESSURE: 135 MMHG | HEIGHT: 69 IN | HEART RATE: 80 BPM

## 2018-09-05 DIAGNOSIS — Z51.89 VISIT FOR WOUND CHECK: ICD-10-CM

## 2018-09-05 DIAGNOSIS — Z01.30 BP CHECK: Primary | ICD-10-CM

## 2018-09-05 NOTE — LETTER
"9/5/2018       RE: Ruma Vega  [address] [place] [address]     Dear Colleague,    Thank you for referring your patient, Ruma Vega, to the [facility] at [facility]. Please see a copy of my visit note below.    Acute Post-partum Visit  9/5/18    S: Patient is a 36 yo  s/p PLTCS on 8/21/18 at 38w3d for new onset gestational hypertension and concern for LGA.  Patient's pregnancy also complicated by history of Factor V Leiden heterozygosity and history of kidney donation to her father. Patient seen last week for BP check with BP > 150/90 and normal HELLP labs.  Plan made to initiate Nifedipine 30 mg XL at that time.  Since then she feels well, did have a general sense of feeling bad over weekend, but took BP at home and was 120s/80s and resolved on its own.  Pain controlled with periodic Tylenol.  Minimal lochia.  Tolerating regular diet.  No issues with bowel or bladder.  Really enjoying time with Jose Manuel.      O:  Vitals:    09/05/18 1140 09/05/18 1144   BP: 134/90 135/88   Pulse: 81 80   Height: 1.753 m (5' 9\")      General: NAD, A&Ox3  Resp: Non-labored breathing  Incision: Pfannenstiel, C/D/I, steristrips removed today    A/P: 36 yo  presents for BP Check and incision check  1) BP check: Mild range BP on Nifedipine 30 mg XL, will continue at this time, discussed if she notices low BP < 110/70, to call and will consider DC at that time.  She understands and is agreeable with this plan.  2) Incision check: Well healed, steristrips removed today  3) RTC in 2 weeks for BP check, 4 weeks for 6 week postpartum visit    Agnes Wheeler MD    Again, thank you for allowing me to participate in the care of your patient.      Sincerely,    Agnes Wheeler MD      "

## 2018-09-05 NOTE — LETTER
"9/5/2018      RE: Ruma Vega  [address] [place] [address]       Acute Post-partum Visit  9/5/18    S: Patient is a 36 yo  s/p PLTCS on 8/21/18 at 38w3d for new onset gestational hypertension and concern for LGA.  Patient's pregnancy also complicated by history of Factor V Leiden heterozygosity and history of kidney donation to her father. Patient seen last week for BP check with BP > 150/90 and normal HELLP labs.  Plan made to initiate Nifedipine 30 mg XL at that time.  Since then she feels well, did have a general sense of feeling bad over weekend, but took BP at home and was 120s/80s and resolved on its own.  Pain controlled with periodic Tylenol.  Minimal lochia.  Tolerating regular diet.  No issues with bowel or bladder.  Really enjoying time with Jose Manuel.      O:  Vitals:    09/05/18 1140 09/05/18 1144   BP: 134/90 135/88   Pulse: 81 80   Height: 1.753 m (5' 9\")      General: NAD, A&Ox3  Resp: Non-labored breathing  Incision: Pfannenstiel, C/D/I, steristrips removed today    A/P: 36 yo  presents for BP Check and incision check  1) BP check: Mild range BP on Nifedipine 30 mg XL, will continue at this time, discussed if she notices low BP < 110/70, to call and will consider DC at that time.  She understands and is agreeable with this plan.  2) Incision check: Well healed, steristrips removed today  3) RTC in 2 weeks for BP check, 4 weeks for 6 week postpartum visit      Agnes Wheeler MD      "

## 2018-09-05 NOTE — MR AVS SNAPSHOT
After Visit Summary   9/5/2018    Ruma Vega    MRN: [id number]           Patient Information     Date Of Birth          1980        Visit Information        Provider Department      9/5/2018 11:30 AM Agnes Wheeler MD [facility]        Today's Diagnoses     BP check    -  1    Visit for wound check           Follow-ups after your visit        Your next 10 appointments already scheduled     Sep 20, 2018 11:45 AM CDT   Return Visit with Agnes Wheeler MD   [facility] ([facility])    [facility] [address]  [address]  [address]  [facility] [id number] [phone]            Oct 08, 2018  1:00 PM CDT   RETURN EXTENDED with Agnes Wheeler MD   [facility] ([facility])    [facility] [address]  [address]  [address]  [facility] [address] [phone]              Who to contact     Please call your clinic at [phone] to:    Ask questions about your health    Make or cancel appointments    Discuss your medicines    Learn about your test results    Speak to your doctor            Additional Information About Your Visit        [employer] Information     [email or web address] gives you secure access to your electronic health record. If you see a primary care provider, you can also send messages to your care team and make appointments. If you have questions, please call your primary care clinic.  If you do not have a primary care provider, please call [phone] and they will assist you.      [email or web address] is an electronic gateway that provides easy, online access to your medical records. With [email or web address], you can request a clinic appointment, read your test results, renew a prescription or communicate with your care team.     To access your existing account, please contact your [facility] or call [phone] for assistance.        Care  "EveryWhere ID     This is your Care EveryWhere ID. This could be used by other organizations to access your [place] medical records  [id number]        Your Vitals Were     Pulse Height                80 1.753 m (5' 9\")           Blood Pressure from Last 3 Encounters:   09/05/18 135/88   08/27/18 (!) 146/97   08/24/18 147/87    Weight from Last 3 Encounters:   08/21/18 121.1 kg (267 lb)   08/20/18 121.5 kg (267 lb 14.4 oz)   08/13/18 120.3 kg (265 lb 3.2 oz)              Today, you had the following     No orders found for display       Primary Care Provider Office Phone # Fax #    Shannon Jerri Holder [phone]       [facility] [address]  [facility] [address]        Equal Access to Services     [facility]: Hadii cindy neffo Sorobert, waaxda luqadaha, qaybta kaalmada adeseymour, luba shoemaker . So [facility] [phone].    ATENCIÓN: Si habla español, tiene a figueroa disposición servicios gratuitos de asistencia lingüística. Llame al [phone].    We comply with applicable federal civil rights laws and [place] laws. We do not discriminate on the basis of race, color, national origin, age, disability, sex, sexual orientation, or gender identity.            Thank you!     Thank you for choosing [facility]  for your care. Our goal is always to provide you with excellent care. Hearing back from our patients is one way we can continue to improve our services. Please take a few minutes to complete the written survey that you may receive in the mail after your visit with us. Thank you!             Your Updated Medication List - Protect others around you: Learn how to safely use, store and throw away your medicines at www.disposemymeds.org.          This list is accurate as of 9/5/18 11:59 PM.  Always use your most recent med list.                   Brand Name Dispense Instructions for use Diagnosis    acetaminophen 325 MG tablet    " TYLENOL    100 tablet    Take 2 tablets (650 mg) by mouth every 4 hours as needed for pain    S/P  section       aspirin 81 MG tablet      Take by mouth daily        enoxaparin 40 MG/0.4ML injection    LOVENOX    14.4 mL    Inject 0.4 mLs (40 mg) Subcutaneous every 24 hours Inject 40mg (0.4mls) every 24 hours until 2018    Factor V Leiden (H)       ferrous sulfate 325 (65 Fe) MG tablet    IRON    90 tablet    Take 1 tablet (325 mg) by mouth daily with food    Anemia due to blood loss, acute       FLONASE 50 MCG/ACT spray   Generic drug:  fluticasone     1 Package    Spray 1-2 sprays into both nostrils daily    Allergic rhinitis       FOLIC ACID PO      Take 800 mcg by mouth daily        ketotifen 0.025 % Soln ophthalmic solution    ZADITOR/REFRESH ANTI-ITCH     Place 1 drop into both eyes 2 times daily        mometasone 0.1 % cream    ELOCON     Apply topically daily        NIFEdipine ER osmotic 30 MG 24 hr tablet    PROCARDIA XL    30 tablet    Take 1 tablet (30 mg) by mouth daily    Pregnancy-induced hypertension in third trimester       PRENATAL VITAMINS PO      Take  by mouth daily.    Flu vaccine need       psyllium 58.6 % Powd    METAMUCIL     Take by mouth daily        ranitidine 150 MG tablet    ZANTAC     Take 150 mg by mouth 2 times daily        senna-docusate 8.6-50 MG per tablet    SENOKOT-S;PERICOLACE    60 tablet    Take 1 tablet by mouth 2 times daily as needed for constipation    S/P  section       SINUS RINSE BOTTLE KIT NA      Spray 1 packet in nostril as needed        VITAMIN D-3 PO

## 2018-09-05 NOTE — PROGRESS NOTES
"Acute Post-partum Visit  9/5/18    S: Patient is a 36 yo  s/p PLTCS on 8/21/18 at 38w3d for new onset gestational hypertension and concern for LGA.  Patient's pregnancy also complicated by history of Factor V Leiden heterozygosity and history of kidney donation to her father. Patient seen last week for BP check with BP > 150/90 and normal HELLP labs.  Plan made to initiate Nifedipine 30 mg XL at that time.  Since then she feels well, did have a general sense of feeling bad over weekend, but took BP at home and was 120s/80s and resolved on its own.  Pain controlled with periodic Tylenol.  Minimal lochia.  Tolerating regular diet.  No issues with bowel or bladder.  Really enjoying time with Jose Manuel.      O:  Vitals:    09/05/18 1140 09/05/18 1144   BP: 134/90 135/88   Pulse: 81 80   Height: 1.753 m (5' 9\")      General: NAD, A&Ox3  Resp: Non-labored breathing  Incision: Pfannenstiel, C/D/I, steristrips removed today    A/P: 36 yo  presents for BP Check and incision check  1) BP check: Mild range BP on Nifedipine 30 mg XL, will continue at this time, discussed if she notices low BP < 110/70, to call and will consider DC at that time.  She understands and is agreeable with this plan.  2) Incision check: Well healed, steristrips removed today  3) RTC in 2 weeks for BP check, 4 weeks for 6 week postpartum visit    Agnes Wheeler MD  "

## 2018-09-20 ENCOUNTER — OFFICE VISIT (OUTPATIENT)
Dept: OBGYN | Facility: CLINIC | Age: 38
End: 2018-09-20
Attending: OBSTETRICS & GYNECOLOGY
Payer: COMMERCIAL

## 2018-09-20 VITALS
WEIGHT: 237.8 LBS | SYSTOLIC BLOOD PRESSURE: 123 MMHG | DIASTOLIC BLOOD PRESSURE: 85 MMHG | HEIGHT: 69 IN | HEART RATE: 75 BPM | BODY MASS INDEX: 35.22 KG/M2

## 2018-09-20 DIAGNOSIS — Z01.30 BP CHECK: ICD-10-CM

## 2018-09-20 DIAGNOSIS — O13.3 PREGNANCY-INDUCED HYPERTENSION IN THIRD TRIMESTER: Primary | ICD-10-CM

## 2018-09-20 DIAGNOSIS — Z98.891 S/P CESAREAN SECTION: ICD-10-CM

## 2018-09-20 NOTE — LETTER
"9/20/2018     RE: Ruma Vega  [address] [facility] [address]     Dear Colleague,    Thank you for referring your patient, Ruma Vega, to the [facility] at [facility]. Please see a copy of my visit note below.    Acute Post-Parutm Visit Note  9/20/18    Reason for visit: BP check    S: Patient is a 38 yo  s/p PLTCS on 8/21/18 for new onset gestational hypertension at 38w3d.  Patient;s pregnancy also complicated by Factor V Leiden heterozygosity and history of kidney donation to her father.  PAtient was seen in the office 1 week postpartum for BP check with BP > 150/90 but normal HELLP labs.  Initiated Nifedipine 30 mg XR.  She was seen 1 week later and was doing well on medication without side effects and improved BP to 130s/80s-90s.  Plan made to continue this medication and she was comfortable with this plan.  She presents for BP check again today.  Continues to do well.  Walking daily and feels some soreness but nothing concerning.  HAs had some waxing and waning of her bleeding but nothing where she soaks a pad more than 1 in an hour and overall not concerning.  Jose Manuel is starting to sleep longer stretches so that has been helpful.  Continues to pump and feed through bottle which is going well.  No other concerns at this time.    O:  /85  Pulse 75  Ht 1.753 m (5' 9\")  Wt 107.9 kg (237 lb 12.8 oz)  BMI 35.12 kg/m2     General: NAD, A&OX3  Resp: Non-labored breathing    A/P: 38 yo  presents for BP check s/p initiation of Nifedipine 30 mg XR postpartum  1) BP check: BP stable today, continue until PP Visit, will then discontinue and ensure resolution.  Call if BP drops < 110/70.  2) RTC in 2 weeks for postpartum visit    Agnes Wheeler MD    "

## 2018-09-20 NOTE — MR AVS SNAPSHOT
After Visit Summary   2018    Ruma Vega    MRN: [id number]           Patient Information     Date Of Birth          1980        Visit Information        Provider Department      2018 11:45 AM Agnes Wheeler MD [facility]        Today's Diagnoses     Pregnancy-induced hypertension in third trimester    -  1    BP check        S/P  section           Follow-ups after your visit        Your next 10 appointments already scheduled     Oct 08, 2018  1:00 PM CDT   RETURN EXTENDED with Agnes Wheeler MD   [facility] ([facility])    [facility] [address]  [address]  [address]  [facility] [id number] [phone]              Who to contact     Please call your clinic at [phone] to:    Ask questions about your health    Make or cancel appointments    Discuss your medicines    Learn about your test results    Speak to your doctor            Additional Information About Your Visit        MyChart Information     [employer] gives you secure access to your electronic health record. If you see a primary care provider, you can also send messages to your care team and make appointments. If you have questions, please call your primary care clinic.  If you do not have a primary care provider, please call [phone] and they will assist you.      [employer] is an electronic gateway that provides easy, online access to your medical records. With [employer], you can request a clinic appointment, read your test results, renew a prescription or communicate with your care team.     To access your existing account, please contact your [facility] or call [phone] for assistance.        Care EveryWhere ID     This is your Care EveryWhere ID. This could be used by other organizations to access your [place] medical records  [id number]        Your Vitals Were     Pulse Height BMI  "(Body Mass Index)             75 1.753 m (5' 9\") 35.12 kg/m2          Blood Pressure from Last 3 Encounters:   18 123/85   18 135/88   18 (!) 146/97    Weight from Last 3 Encounters:   18 107.9 kg (237 lb 12.8 oz)   18 121.1 kg (267 lb)   18 121.5 kg (267 lb 14.4 oz)              Today, you had the following     No orders found for display       Primary Care Provider Office Phone # Fax #    Shannon Jerri Holder [phone]       [facility] [address]  [facility] [address]        Equal Access to Services     JOSÉ ANTONIO WESTFALL : Franck neffo Sorobert, waaxda luqadaha, qaybta kaalmada adeseymour, luba garcia. So [facility] [phone].    ATENCIÓN: Si habla español, tiene a figueroa disposición servicios gratuitos de asistencia lingüística. [facility] [phone].    We comply with applicable federal civil rights laws and [place] laws. We do not discriminate on the basis of race, color, national origin, age, disability, sex, sexual orientation, or gender identity.            Thank you!     Thank you for choosing [facility]  for your care. Our goal is always to provide you with excellent care. Hearing back from our patients is one way we can continue to improve our services. Please take a few minutes to complete the written survey that you may receive in the mail after your visit with us. Thank you!             Your Updated Medication List - Protect others around you: Learn how to safely use, store and throw away your medicines at www.disposemymeds.org.          This list is accurate as of 18  7:29 PM.  Always use your most recent med list.                   Brand Name Dispense Instructions for use Diagnosis    acetaminophen 325 MG tablet    TYLENOL    100 tablet    Take 2 tablets (650 mg) by mouth every 4 hours as needed for pain    S/P  section       aspirin 81 MG tablet      Take by mouth " daily        enoxaparin 40 MG/0.4ML injection    LOVENOX    14.4 mL    Inject 0.4 mLs (40 mg) Subcutaneous every 24 hours Inject 40mg (0.4mls) every 24 hours until 2018    Factor V Leiden (H)       ferrous sulfate 325 (65 Fe) MG tablet    IRON    90 tablet    Take 1 tablet (325 mg) by mouth daily with food    Anemia due to blood loss, acute       FLONASE 50 MCG/ACT spray   Generic drug:  fluticasone     1 Package    Spray 1-2 sprays into both nostrils daily    Allergic rhinitis       FOLIC ACID PO      Take 800 mcg by mouth daily        ketotifen 0.025 % Soln ophthalmic solution    ZADITOR/REFRESH ANTI-ITCH     Place 1 drop into both eyes 2 times daily        mometasone 0.1 % cream    ELOCON     Apply topically daily        NIFEdipine ER osmotic 30 MG 24 hr tablet    PROCARDIA XL    30 tablet    Take 1 tablet (30 mg) by mouth daily    Pregnancy-induced hypertension in third trimester       PRENATAL VITAMINS PO      Take  by mouth daily.    Flu vaccine need       psyllium 58.6 % Powd    METAMUCIL     Take by mouth daily        ranitidine 150 MG tablet    ZANTAC     Take 150 mg by mouth 2 times daily        senna-docusate 8.6-50 MG per tablet    SENOKOT-S;PERICOLACE    60 tablet    Take 1 tablet by mouth 2 times daily as needed for constipation    S/P  section       SINUS RINSE BOTTLE KIT NA      Spray 1 packet in nostril as needed        VITAMIN D-3 PO

## 2018-09-21 NOTE — PROGRESS NOTES
"Acute Post-Parutm Visit Note  9/20/18    Reason for visit: BP check    S: Patient is a 36 yo  s/p PLTCS on 8/21/18 for new onset gestational hypertension at 38w3d.  Patient;s pregnancy also complicated by Factor V Leiden heterozygosity and history of kidney donation to her father.  PAtient was seen in the office 1 week postpartum for BP check with BP > 150/90 but normal HELLP labs.  Initiated Nifedipine 30 mg XR.  She was seen 1 week later and was doing well on medication without side effects and improved BP to 130s/80s-90s.  Plan made to continue this medication and she was comfortable with this plan.  She presents for BP check again today.  Continues to do well.  Walking daily and feels some soreness but nothing concerning.  HAs had some waxing and waning of her bleeding but nothing where she soaks a pad more than 1 in an hour and overall not concerning.  Jose Manuel is starting to sleep longer stretches so that has been helpful.  Continues to pump and feed through bottle which is going well.  No other concerns at this time.    O:  /85  Pulse 75  Ht 1.753 m (5' 9\")  Wt 107.9 kg (237 lb 12.8 oz)  BMI 35.12 kg/m2     General: NAD, A&OX3  Resp: Non-labored breathing    A/P: 36 yo  presents for BP check s/p initiation of Nifedipine 30 mg XR postpartum  1) BP check: BP stable today, continue until PP Visit, will then discontinue and ensure resolution.  Call if BP drops < 110/70.  2) RTC in 2 weeks for postpartum visit    Agnes Wheeler MD  "

## 2018-09-22 ENCOUNTER — HEALTH MAINTENANCE LETTER (OUTPATIENT)
Age: 38
End: 2018-09-22

## 2018-10-08 ENCOUNTER — OFFICE VISIT (OUTPATIENT)
Dept: OBGYN | Facility: CLINIC | Age: 38
End: 2018-10-08
Attending: OBSTETRICS & GYNECOLOGY
Payer: COMMERCIAL

## 2018-10-08 VITALS
DIASTOLIC BLOOD PRESSURE: 100 MMHG | HEART RATE: 86 BPM | HEIGHT: 69 IN | SYSTOLIC BLOOD PRESSURE: 157 MMHG | BODY MASS INDEX: 35.18 KG/M2 | WEIGHT: 237.5 LBS

## 2018-10-08 DIAGNOSIS — Z98.891 S/P CESAREAN SECTION: Primary | ICD-10-CM

## 2018-10-08 DIAGNOSIS — Z52.4 KIDNEY DONOR: ICD-10-CM

## 2018-10-08 DIAGNOSIS — O13.3 GESTATIONAL HYPERTENSION, THIRD TRIMESTER: ICD-10-CM

## 2018-10-08 DIAGNOSIS — D68.51 FACTOR V LEIDEN (H): ICD-10-CM

## 2018-10-08 LAB — HEMOGLOBIN: 12.2 G/DL (ref 11.7–15.7)

## 2018-10-08 PROCEDURE — 85018 HEMOGLOBIN: CPT | Mod: ZF | Performed by: OBSTETRICS & GYNECOLOGY

## 2018-10-08 PROCEDURE — G0463 HOSPITAL OUTPT CLINIC VISIT: HCPCS | Mod: ZF

## 2018-10-08 RX ORDER — NIFEDIPINE 30 MG/1
30 TABLET, EXTENDED RELEASE ORAL DAILY
Qty: 14 TABLET | Refills: 0 | Status: SHIPPED | OUTPATIENT
Start: 2018-10-08 | End: 2018-10-12

## 2018-10-08 ASSESSMENT — PATIENT HEALTH QUESTIONNAIRE - PHQ9: 5. POOR APPETITE OR OVEREATING: NOT AT ALL

## 2018-10-08 ASSESSMENT — ANXIETY QUESTIONNAIRES
7. FEELING AFRAID AS IF SOMETHING AWFUL MIGHT HAPPEN: NOT AT ALL
GAD7 TOTAL SCORE: 0
1. FEELING NERVOUS, ANXIOUS, OR ON EDGE: NOT AT ALL
2. NOT BEING ABLE TO STOP OR CONTROL WORRYING: NOT AT ALL
3. WORRYING TOO MUCH ABOUT DIFFERENT THINGS: NOT AT ALL
6. BECOMING EASILY ANNOYED OR IRRITABLE: NOT AT ALL
5. BEING SO RESTLESS THAT IT IS HARD TO SIT STILL: NOT AT ALL

## 2018-10-08 NOTE — NURSING NOTE
SUBJECTIVE:   Ruma Vega is here for her 6-week postpartum checkup.     PHQ-9 score: 1  Hx of Abuse:  No    Delivery Date: 18.    Delivering provider:  Agnes Wheeler MD.    Type of delivery:  .     Delivery complications: None  Infant gender:  boy, weight 8 pounds 13 oz.  Feeding Method:  .  Complications reported with feeding:  none, infant thriving .    Bleeding:  None.  Duration:  5 weeks.  Menses resumed:  No  Bowel/Urinary problems:  No    Contraception Planned:  None -- is she planning pregnancy? no  She  has not had intercourse since delivery..

## 2018-10-08 NOTE — MR AVS SNAPSHOT
After Visit Summary   10/8/2018    Ruma Vega    MRN: [id number]           Patient Information     Date Of Birth          1980        Visit Information        Provider Department      10/8/2018 1:00 PM Agnes Wheeler MD [facility]        Today's Diagnoses     S/P  section    -  1    Gestational hypertension, third trimester        Factor V Leiden (H)        Kidney donor           Follow-ups after your visit        Follow-up notes from your care team     Return in about 1 week (around 10/15/2018) for BP management with Stanley Kay.      Your next 10 appointments already scheduled     Oct 12, 2018 12:45 PM CDT   New Patient Visit with Agueda Kay MD   [facility]  ([facility])    [address],[facility] [address]  [address] [place] [id number] [phone]              Who to contact     Please call your clinic at [phone] to:    Ask questions about your health    Make or cancel appointments    Discuss your medicines    Learn about your test results    Speak to your doctor            Additional Information About Your Visit        MyChart Information     [facility] gives you secure access to your electronic health record. If you see a primary care provider, you can also send messages to your care team and make appointments. If you have questions, please call your primary care clinic.  If you do not have a primary care provider, please call [phone] and they will assist you.      [facility] is an electronic gateway that provides easy, online access to your medical records. With [facility] you can request a clinic appointment, read your test results, renew a prescription or communicate with your care team.     To access your existing account, please contact your [facility] or call [phone] for assistance.        Care EveryWhere ID     This is  "your Care EveryWhere ID. This could be used by other organizations to access your [place] medical records  [id number]        Your Vitals Were     Pulse Height BMI (Body Mass Index)             86 1.753 m (5' 9\") 35.07 kg/m2          Blood Pressure from Last 3 Encounters:   10/08/18 (!) 157/100   09/20/18 123/85   09/05/18 135/88    Weight from Last 3 Encounters:   10/08/18 107.7 kg (237 lb 8 oz)   09/20/18 107.9 kg (237 lb 12.8 oz)   08/21/18 121.1 kg (267 lb)              We Performed the Following     Hemoglobin POCT          Today's Medication Changes          These changes are accurate as of 10/8/18 11:59 PM.  If you have any questions, ask your nurse or doctor.               Stop taking these medicines if you haven't already. Please contact your care team if you have questions.     acetaminophen 325 MG tablet   Commonly known as:  TYLENOL   Stopped by:  Agnes Wheeler MD           aspirin 81 MG tablet   Stopped by:  Agnes Wheeler MD           ferrous sulfate 325 (65 Fe) MG tablet   Commonly known as:  IRON   Stopped by:  Agnes Wheeler MD           ranitidine 150 MG tablet   Commonly known as:  ZANTAC   Stopped by:  Agnes Wheeler MD           senna-docusate 8.6-50 MG per tablet   Commonly known as:  SENOKOT-S;PERICOLACE   Stopped by:  Agnes Wheeler MD                Where to get your medicines      These medications were sent to [facility]/pharmacy #[id number] - [place], [place] [address], [facility] [address]     Phone:  [phone]     NIFEdipine ER osmotic 30 MG 24 hr tablet                Primary Care Provider Office Phone # Fax #    Shannon Holder [phone]       [facility] [address] [facility] [address]        Equal Access to Services     JOSÉ ANTONIO WESTFALL AH: Franck Parks, wadanielle josephqkristel, qaybmagdalena arshadalluba gallegos. So [facility] " [phone].    ATENCIÓN: Si mike miller, tiene a figueroa disposición servicios gratuitos de asistencia lingüística. [facility] [phone].    We comply with applicable federal civil rights laws and [place] laws. We do not discriminate on the basis of race, color, national origin, age, disability, sex, sexual orientation, or gender identity.            Thank you!     Thank you for choosing [facility]  for your care. Our goal is always to provide you with excellent care. Hearing back from our patients is one way we can continue to improve our services. Please take a few minutes to complete the written survey that you may receive in the mail after your visit with us. Thank you!             Your Updated Medication List - Protect others around you: Learn how to safely use, store and throw away your medicines at www.disposemymeds.org.          This list is accurate as of 10/8/18 11:59 PM.  Always use your most recent med list.                   Brand Name Dispense Instructions for use Diagnosis    FLONASE 50 MCG/ACT spray   Generic drug:  fluticasone     1 Package    Spray 1-2 sprays into both nostrils daily    Allergic rhinitis       FOLIC ACID PO      Take 800 mcg by mouth daily        ketotifen 0.025 % Soln ophthalmic solution    ZADITOR/REFRESH ANTI-ITCH     Place 1 drop into both eyes 2 times daily        mometasone 0.1 % cream    ELOCON     Apply topically daily        NIFEdipine ER osmotic 30 MG 24 hr tablet    PROCARDIA XL    14 tablet    Take 1 tablet (30 mg) by mouth daily    Gestational hypertension, third trimester       PRENATAL VITAMINS PO      Take  by mouth daily.    Flu vaccine need       psyllium 58.6 % Powd    METAMUCIL     Take by mouth daily        SINUS RINSE BOTTLE KIT NA      Spray 1 packet in nostril as needed        VITAMIN D-3 PO

## 2018-10-08 NOTE — PROGRESS NOTES
Post-partum Visit Note  10/8/18    SUBJECTIVE:   S: Patient is a 47 yo  who presents for postpartum visit s/p PLTCS on 18 for new onset gestational HTN at 38w3d.  Patient's pregnancy also complicated by Factor V Leiden heterozygosity and history of kidney donation to her father.  Patient has been followed carefully in postpartum time period secondary to gestational HTN diagnosis and was started on Nifedipine 30 mg ER 1 week post-operatively due to multiple BP > 150/90.  She has continued with this medication since that time with normal to mild range BP since that time.      Today, doing well.  No pain.  Bleeding has essentially stopped 1 week ago, and up until that time just had episodes of bleeding weekly after 2 weeks post-operatively.  She is tolerating regular diet.  Urinating with occasional urgency but nothing persistent.  Bowels are regular and has taken daily Metamucil to regular even pre-pregnancy.  No longer taking stool softener.  Ambulating without issues, has been doing daily walks since delivery.  Jose Manuel doing well and growing.  Milk supply has been adequate and now getting enough that she can freeze some.  Plans to breastfeed for at least 6 months.  Plans condoms for contraception and will likely try again at 12 months for pregnancy.  Asking about progesterone supplementation and early BhCG with subsequent pregnancy.    Delivery Date: 18.    Delivering provider:  Agnes Wheeler MD.    Type of delivery:  .     Delivery complications: None  Infant gender:  boy, weight 8 pounds 13 oz.  Feeding Method:  .  Complications reported with feeding:  none, infant thriving .    Bleeding:  None.  Duration:  5 weeks.  Menses resumed:  No  Bowel/Urinary problems:  No    Contraception Planned: Condoms, Natural Family Planning  She has not had intercourse since delivery.    ================================================================  ROS: 10 point ROS neg other than the symptoms  "noted above in the HPI.     EXAM:  BP (!) 157/100  Pulse 86  Ht 1.753 m (5' 9\")  Wt 107.7 kg (237 lb 8 oz)  BMI 35.07 kg/m2    General: healthy, alert and no distress  Psych: NEGATIVE  Breasts:  Lactating, Nipples intact with no lesions, Non-tender and No S/S of yeast or mastitis  Abdomen: Benign, Soft, flat, non-tender and No masses, organomegaly  Incision:  Pfannenstiel, well healed and dry and intact     ASSESSMENT: 37 yo  presents for postpartum visit s/p PLTCS on 18 for gestational HTN and concern for fetal macrosomia  Encounter Diagnoses   Name Primary?     S/P  section Yes     Gestational hypertension, third trimester      Factor V Leiden (H)      Kidney donor       Normal postpartum exam after PLTCS  Pregnancy was complicated by: Gestational HTN started on Nifedipine 1 week postpartum, Factor V Leiden heterozygote on Lovenox for 6 weeks postpartum, History of kidney donation      PLAN:  1) s/p : Has made appropriate recovery, no further lifting, activity or pelvic restrictions  2) Gestational HTN: Patient has been off Nifedipine for 1 week now, BP elevated to 157/100.  Will re-start Nifedipine.  Patient has had elevated BP outside of pregnancy in the past, but never given diagnosis of hypertension outside of pregnancy.  Will restart Nifedipine today, and have patient establish care with Dr. Stanley Kay to discuss continued management outside of pregnancy.  3) Factor V Leiden: Plan for discontinue Lovenox at this time as 6 weeks postpartum, call with any concerning symptoms of clot  4) Contraception/Future pregnancy: Discussed recommendation for no pregnancy x 12-18 months post-.  Patient plans condoms and NFP.  With subsequent pregnancy, will plan to prescribe progesterone/sesame oil injections at  specialty pharmacy and early serial [facility] due to history of recurrent losses, and most recent pregnancy successful with the progesterone supplementation.  Had not had " success with vaginal progesterone in past.  5) Screening for malignant neoplasm of cervix: Last 7/21/15 NILM and HPV negative no abnormal pap histroy, next 7/2020  6) RTC in 1 year for annual, earlier with any concerns    Agnes Wheeler MD

## 2018-10-08 NOTE — LETTER
10/8/2018       RE: Ruma Vega  [address] [facility] [address]     Dear Colleague,    Thank you for referring your patient, Ruma Vega, to the [facility] at [facility]. Please see a copy of my visit note below.    Post-partum Visit Note  10/8/18    SUBJECTIVE:   S: Patient is a 49 yo  who presents for postpartum visit s/p PLTCS on 18 for new onset gestational HTN at 38w3d.  Patient's pregnancy also complicated by Factor V Leiden heterozygosity and history of kidney donation to her father.  Patient has been followed carefully in postpartum time period secondary to gestational HTN diagnosis and was started on Nifedipine 30 mg ER 1 week post-operatively due to multiple BP > 150/90.  She has continued with this medication since that time with normal to mild range BP since that time.      Today, doing well.  No pain.  Bleeding has essentially stopped 1 week ago, and up until that time just had episodes of bleeding weekly after 2 weeks post-operatively.  She is tolerating regular diet.  Urinating with occasional urgency but nothing persistent.  Bowels are regular and has taken daily Metamucil to regular even pre-pregnancy.  No longer taking stool softener.  Ambulating without issues, has been doing daily walks since delivery.  Jose Manuel doing well and growing.  Milk supply has been adequate and now getting enough that she can freeze some.  Plans to breastfeed for at least 6 months.  Plans condoms for contraception and will likely try again at 12 months for pregnancy.  Asking about progesterone supplementation and early BhCG with subsequent pregnancy.    Delivery Date: 18.    Delivering provider:  Agnes Wheeler MD.    Type of delivery:  .     Delivery complications: None  Infant gender:  boy, weight 8 pounds 13 oz.  Feeding Method:  .  Complications reported with feeding:  none, infant thriving .    Bleeding:   "None.  Duration:  5 weeks.  Menses resumed:  No  Bowel/Urinary problems:  No    Contraception Planned: Condoms, Natural Family Planning  She has not had intercourse since delivery.    ================================================================  ROS: 10 point ROS neg other than the symptoms noted above in the HPI.     EXAM:  BP (!) 157/100  Pulse 86  Ht 1.753 m (5' 9\")  Wt 107.7 kg (237 lb 8 oz)  BMI 35.07 kg/m2    General: healthy, alert and no distress  Psych: NEGATIVE  Breasts:  Lactating, Nipples intact with no lesions, Non-tender and No S/S of yeast or mastitis  Abdomen: Benign, Soft, flat, non-tender and No masses, organomegaly  Incision:  Pfannenstiel, well healed and dry and intact     ASSESSMENT: 37 yo  presents for postpartum visit s/p PLTCS on 18 for gestational HTN and concern for fetal macrosomia  Encounter Diagnoses   Name Primary?     S/P  section Yes     Gestational hypertension, third trimester      Factor V Leiden (H)      Kidney donor       Normal postpartum exam after PLTCS  Pregnancy was complicated by: Gestational HTN started on Nifedipine 1 week postpartum, Factor V Leiden heterozygote on Lovenox for 6 weeks postpartum, History of kidney donation      PLAN:  1) s/p : Has made appropriate recovery, no further lifting, activity or pelvic restrictions  2) Gestational HTN: Patient has been off Nifedipine for 1 week now, BP elevated to 157/100.  Will re-start Nifedipine.  Patient has had elevated BP outside of pregnancy in the past, but never given diagnosis of hypertension outside of pregnancy.  Will restart Nifedipine today, and have patient establish care with Dr. Stanley Kay to discuss continued management outside of pregnancy.  3) Factor V Leiden: Plan for discontinue Lovenox at this time as 6 weeks postpartum, call with any concerning symptoms of clot  4) Contraception/Future pregnancy: Discussed recommendation for no pregnancy x 12-18 months " post-.  Patient plans condoms and NFP.  With subsequent pregnancy, will plan to prescribe progesterone/sesame oil injections at  specialty pharmacy [facility] due to history of recurrent losses, and most recent pregnancy successful with the progesterone supplementation.  Had not had success with vaginal progesterone in past.  5) Screening for malignant neoplasm of cervix: Last 7/21/15 NILM and HPV negative no abnormal pap histroy, next 2020  6) RTC in 1 year for annual, earlier with any concerns    Agnes Wheeler MD

## 2018-10-09 ASSESSMENT — ANXIETY QUESTIONNAIRES: GAD7 TOTAL SCORE: 0

## 2018-10-09 ASSESSMENT — PATIENT HEALTH QUESTIONNAIRE - PHQ9: SUM OF ALL RESPONSES TO PHQ QUESTIONS 1-9: 1

## 2018-10-12 ENCOUNTER — OFFICE VISIT (OUTPATIENT)
Dept: INTERNAL MEDICINE | Facility: CLINIC | Age: 38
End: 2018-10-12
Attending: INTERNAL MEDICINE
Payer: COMMERCIAL

## 2018-10-12 VITALS
SYSTOLIC BLOOD PRESSURE: 147 MMHG | WEIGHT: 235 LBS | HEIGHT: 69 IN | BODY MASS INDEX: 34.8 KG/M2 | HEART RATE: 74 BPM | DIASTOLIC BLOOD PRESSURE: 100 MMHG

## 2018-10-12 DIAGNOSIS — I10 BENIGN ESSENTIAL HYPERTENSION: ICD-10-CM

## 2018-10-12 PROCEDURE — G0463 HOSPITAL OUTPT CLINIC VISIT: HCPCS | Mod: ZF

## 2018-10-12 RX ORDER — NIFEDIPINE 30 MG/1
30 TABLET, EXTENDED RELEASE ORAL DAILY
Qty: 90 TABLET | Refills: 1 | Status: SHIPPED | OUTPATIENT
Start: 2018-10-12 | End: 2018-10-26

## 2018-10-12 RX ORDER — ENALAPRIL MALEATE 10 MG/1
10 TABLET ORAL DAILY
Qty: 90 TABLET | Refills: 1 | Status: SHIPPED | OUTPATIENT
Start: 2018-10-12 | End: 2018-10-26

## 2018-10-12 ASSESSMENT — PAIN SCALES - GENERAL: PAINLEVEL: NO PAIN (0)

## 2018-10-12 NOTE — MR AVS SNAPSHOT
After Visit Summary   10/12/2018    Ruma Vega    MRN: [id number]           Patient Information     Date Of Birth          1980        Visit Information        Provider Department      10/12/2018 12:45 PM Agueda Chun MD [facility]         Today's Diagnoses     Benign essential hypertension           Follow-ups after your visit        Follow-up notes from your care team     Return in about 2 weeks (around 10/26/2018) for BP Recheck.      Your next 10 appointments already scheduled     Oct 26, 2018 11:30 AM CDT   New Patient Visit with Agueda Kay MD   [facility]  ([facility])    [facility]  3rd [facility] [address]  [address]  [address] [address] [phone]              Who to contact     Please call your clinic at [phone] to:    Ask questions about your health    Make or cancel appointments    Discuss your medicines    Learn about your test results    Speak to your doctor            Additional Information About Your Visit        MyChart Information     [employer] gives you secure access to your electronic health record. If you see a primary care provider, you can also send messages to your care team and make appointments. If you have questions, please call your primary care clinic.  If you do not have a primary care provider, please call [phone] and they will assist you.      [employer] is an electronic gateway that provides easy, online access to your medical records. With [employer], you can request a clinic appointment, read your test results, renew a prescription or communicate with your care team.     To access your existing account, please contact your [facility] or call [phone] for assistance.        Care EveryWhere ID     This is your Care EveryWhere ID. This could be used by other organizations to access your [facility]  "records  [id number]        Your Vitals Were     Pulse Height BMI (Body Mass Index)             74 1.753 m (5' 9\") 34.7 kg/m2          Blood Pressure from Last 3 Encounters:   10/12/18 (!) 147/100   10/08/18 (!) 157/100   09/20/18 123/85    Weight from Last 3 Encounters:   10/12/18 106.6 kg (235 lb)   10/08/18 107.7 kg (237 lb 8 oz)   09/20/18 107.9 kg (237 lb 12.8 oz)              Today, you had the following     No orders found for display         Today's Medication Changes          These changes are accurate as of 10/12/18 11:59 PM.  If you have any questions, ask your nurse or doctor.               Start taking these medicines.        Dose/Directions    enalapril 10 MG tablet   Commonly known as:  VASOTEC   Used for:  Benign essential hypertension   Started by:  Agueda Chun MD        Dose:  10 mg   Take 1 tablet (10 mg) by mouth daily   Quantity:  90 tablet   Refills:  1            Where to get your medicines      These medications were sent to [facility]/pharmacy #[id number] - [place], [place] - [address], [facility] [address]     Phone:  [phone]     enalapril 10 MG tablet    NIFEdipine ER osmotic 30 MG 24 hr tablet                Primary Care Provider Office Phone # Fax #    Shannon Jerri Holder [phone]       [facility] [address] [facility] [address]        Equal Access to Services     [facility] AH: Hadii cindy chaves hadsharono Sorobert, waaxda luqadaha, qaybta kaalmada adeegyada, luba garcia. So [facility] [phone].    ATENCIÓN: Si habla español, tiene a figueroa disposición servicios gratuitos de asistencia lingüística. Llame al [phone].    We comply with applicable federal civil rights laws and [place] laws. We do not discriminate on the basis of race, color, national origin, age, disability, sex, sexual orientation, or gender identity.            Thank you!     Thank you for " choosing [facility]   for your care. Our goal is always to provide you with excellent care. Hearing back from our patients is one way we can continue to improve our services. Please take a few minutes to complete the written survey that you may receive in the mail after your visit with us. Thank you!             Your Updated Medication List - Protect others around you: Learn how to safely use, store and throw away your medicines at www.disposemymeds.org.          This list is accurate as of 10/12/18 11:59 PM.  Always use your most recent med list.                   Brand Name Dispense Instructions for use Diagnosis    enalapril 10 MG tablet    VASOTEC    90 tablet    Take 1 tablet (10 mg) by mouth daily    Benign essential hypertension       FLONASE 50 MCG/ACT spray   Generic drug:  fluticasone     1 Package    Spray 1-2 sprays into both nostrils daily    Allergic rhinitis       FOLIC ACID PO      Take 800 mcg by mouth daily        ketotifen 0.025 % Soln ophthalmic solution    ZADITOR/REFRESH ANTI-ITCH     Place 1 drop into both eyes 2 times daily        mometasone 0.1 % cream    ELOCON     Apply topically daily        NIFEdipine ER osmotic 30 MG 24 hr tablet    PROCARDIA XL    90 tablet    Take 1 tablet (30 mg) by mouth daily    Benign essential hypertension       PRENATAL VITAMINS PO      Take  by mouth daily.    Flu vaccine need       psyllium 58.6 % Powd    METAMUCIL     Take by mouth daily        SINUS RINSE BOTTLE KIT NA      Spray 1 packet in nostril as needed        VITAMIN D-3 PO

## 2018-10-12 NOTE — NURSING NOTE
Chief Complaint   Patient presents with     Follow Up For     blood pressure      Patient had appt 10/8/2018 was started on medication. Patient states this is a follow up from that appt. Sharda Gillis CMA on 10/12/2018 at 12:46 PM   see vitals for patient blood pressure protcol. Patient average blood pressure was 143/98 sitting right arm with large cuff. Sharda Gillis CMA on 10/12/2018 at 1:04 PM

## 2018-10-12 NOTE — LETTER
10/12/2018       RE: Ruma Vega  [address] [facility] [address]     Dear Colleague,    Thank you for referring your patient, Ruma Vega, to the [facility]  at [facility]. Please see a copy of my visit note below.                               SUBJECTIVE:  Ruma Vega is a 38 year old  female with pmh of gestational hypertension and kidney donation who comes in for f/u on blood pressure.  During her pregnancy, her blood pressure was borderline but normotensive until 38 weeks and 2 days.  Her blood pressure remained elevated and she underwent  at 38w3d.  HELLP labs were normal aside from a creatinine of 1.08.   Following delivery, her blood pressure did improve.  She was discharged without any medications.  However her blood pressure did again increase in the week following delivery, and she was started on 30 mg nifedipine XL daily.  Her blood pressure did improve with the addition of this medication.  However she held it for 1 week prior to her 6-week postpartum visit, at which time her blood pressure was elevated to 157/100.  She was restarted on nifedipine and is here for follow-up today.  She has not had any side effects from the nifedipine.  She denies any headaches, changes in vision, chest pain, leg swelling.  She is interested in what can be done to better manage her blood pressure, including dietary modifications.  She is currently breast-feeding by exclusively pumping.    Past Medical History:   Diagnosis Date     Factor V Leiden (H)      Rhinitis, allergic      Past Surgical History:   Procedure Laterality Date      SECTION N/A 2018    Procedure:  SECTION;;  Surgeon: Agnes Wheeler MD;  [facility]     DILATION AND CURETTAGE SUCTION N/A 10/31/2015    Procedure: DILATION AND CURETTAGE SUCTION;  Surgeon: Agnes Wheeler MD;  Location: [facility] OR     EXTRACTION(S)  "DENTAL       NEPHRECTOMY  2010    left; donor to father     Past Surgical History:   Procedure Laterality Date      SECTION N/A 2018    Procedure:  SECTION;;  Surgeon: Agnes Wheeler MD;  [facility]     DILATION AND CURETTAGE SUCTION N/A 10/31/2015    Procedure: DILATION AND CURETTAGE SUCTION;  Surgeon: Agnes Wheeler MD;  [facility]     EXTRACTION(S) DENTAL       NEPHRECTOMY  2010    left; donor to father     Family History   Problem Relation Age of Onset     C.A.D. Maternal Grandfather      Hypertension Maternal Grandfather      Diabetes Maternal Grandmother      KIDNEY DISEASE Father      transplant     Breast Cancer Maternal Aunt      Breast Cancer Maternal Aunt      Other Cancer Maternal Aunt      prancreatic ca     Breast Cancer Paternal Grandmother      Infertility Sister      currently pregnant wtih IVF     Social History   Substance Use Topics     Smoking status: Never Smoker     Smokeless tobacco: Never Used     Alcohol use No      Comment: atopped in pregnancy   Works as an     Medications and allergies reviewed by me today.     OBJECTIVE:    BP (!) 147/100 (BP Location: Right arm, Patient Position: Sitting, Cuff Size: Adult Large)  Pulse 74  Ht 1.753 m (5' 9\")  Wt 106.6 kg (235 lb)  BMI 34.7 kg/m2   Wt Readings from Last 1 Encounters:   10/12/18 106.6 kg (235 lb)       General: Pleasant female, in NAD  ENT: TMs normal bilaterally, oropharynx clear, MMM  Neck:  No LAD, no thyromegaly, no carotid bruits  Resp: lungs CAB  CV: Heart RRR, no MRG  Abd: Soft, NT, ND, nl bowel sounds, no HSM  Ext: WWP, no LE edema  Skin: warm, dry, no rash  Neuro: AOX3, no focal deficits     ASSESSMENT/PLAN:  Ruma was seen today for follow up for blood pressure.     Diagnoses and all orders for this visit:    Benign essential hypertension. Continue nifedipine, add enalapril. Goal BP is 120/80. Discussed importance of contraception with ACEI.   -     enalapril " (VASOTEC) 10 MG tablet; Take 1 tablet (10 mg) by mouth daily  -     NIFEdipine ER osmotic (PROCARDIA XL) 30 MG 24 hr tablet; Take 1 tablet (30 mg) by mouth daily      Pt should return to clinic for f/u with me in 2  weeks    Agueda Kay MD  10/15/18

## 2018-10-15 NOTE — PROGRESS NOTES
SUBJECTIVE:  Ruma Vega is a 38 year old  female with pmh of gestational hypertension and kidney donation who comes in for f/u on blood pressure.  During her pregnancy, her blood pressure was borderline but normotensive until 38 weeks and 2 days.  Her blood pressure remained elevated and she underwent  at 38w3d.  HELLP labs were normal aside from a creatinine of 1.08.   Following delivery, her blood pressure did improve.  She was discharged without any medications.  However her blood pressure did again increase in the week following delivery, and she was started on 30 mg nifedipine XL daily.  Her blood pressure did improve with the addition of this medication.  However she held it for 1 week prior to her 6-week postpartum visit, at which time her blood pressure was elevated to 157/100.  She was restarted on nifedipine and is here for follow-up today.  She has not had any side effects from the nifedipine.  She denies any headaches, changes in vision, chest pain, leg swelling.  She is interested in what can be done to better manage her blood pressure, including dietary modifications.  She is currently breast-feeding by exclusively pumping.    Past Medical History:   Diagnosis Date     Factor V Leiden (H)      Rhinitis, allergic      Past Surgical History:   Procedure Laterality Date      SECTION N/A 2018    Procedure:  SECTION;;  Surgeon: Agnes Wheeler MD;  [facility]     DILATION AND CURETTAGE SUCTION N/A 10/31/2015    Procedure: DILATION AND CURETTAGE SUCTION;  Surgeon: Agnes Wheeler MD;  [facility]     EXTRACTION(S) DENTAL       NEPHRECTOMY  2010    left; donor to father     Past Surgical History:   Procedure Laterality Date      SECTION N/A 2018    Procedure:  SECTION;;  Surgeon: Agnes Wheeler MD;  [facility]     DILATION AND CURETTAGE SUCTION N/A 10/31/2015    Procedure: DILATION  "AND CURETTAGE SUCTION;  Surgeon: Agnes Wheeler MD;  [facility]     EXTRACTION(S) DENTAL       NEPHRECTOMY  11/11/2010    left; donor to father     Family History   Problem Relation Age of Onset     C.A.D. Maternal Grandfather      Hypertension Maternal Grandfather      Diabetes Maternal Grandmother      KIDNEY DISEASE Father      transplant     Breast Cancer Maternal Aunt      Breast Cancer Maternal Aunt      Other Cancer Maternal Aunt      prancreatic ca     Breast Cancer Paternal Grandmother      Infertility Sister      currently pregnant wtih IVF     Social History   Substance Use Topics     Smoking status: Never Smoker     Smokeless tobacco: Never Used     Alcohol use No      Comment: atopped in pregnancy   Works as an     Medications and allergies reviewed by me today.       Review Of Systems    10 point ROS of systems including Constitutional, Eyes, Respiratory, Cardiovascular, Pulmonary, Gastroenterology, Genitourinary, Integumentary, Musculoskeletal, Psychiatric were all negative except for pertinent positives noted in my HPI.    OBJECTIVE:    BP (!) 147/100 (BP Location: Right arm, Patient Position: Sitting, Cuff Size: Adult Large)  Pulse 74  Ht 1.753 m (5' 9\")  Wt 106.6 kg (235 lb)  BMI 34.7 kg/m2   Wt Readings from Last 1 Encounters:   10/12/18 106.6 kg (235 lb)       General: Pleasant female, in NAD  ENT: TMs normal bilaterally, oropharynx clear, MMM  Neck:  No LAD, no thyromegaly, no carotid bruits  Resp: lungs CAB  CV: Heart RRR, no MRG  Abd: Soft, NT, ND, nl bowel sounds, no HSM  Ext: WWP, no LE edema  Skin: warm, dry, no rash  Neuro: AOX3, no focal deficits     ASSESSMENT/PLAN:  Ruma was seen today for follow up for blood pressure.     Diagnoses and all orders for this visit:    Benign essential hypertension. Continue nifedipine, add enalapril. Goal BP is 120/80. Discussed importance of contraception with ACEI.   -     enalapril (VASOTEC) 10 MG tablet; Take 1 tablet (10 mg) " by mouth daily  -     NIFEdipine ER osmotic (PROCARDIA XL) 30 MG 24 hr tablet; Take 1 tablet (30 mg) by mouth daily      Pt should return to clinic for f/u with me in 2  weeks    Agueda Kay MD  10/15/18

## 2018-10-26 ENCOUNTER — OFFICE VISIT (OUTPATIENT)
Dept: INTERNAL MEDICINE | Facility: CLINIC | Age: 38
End: 2018-10-26
Attending: INTERNAL MEDICINE
Payer: COMMERCIAL

## 2018-10-26 VITALS
DIASTOLIC BLOOD PRESSURE: 89 MMHG | HEART RATE: 73 BPM | SYSTOLIC BLOOD PRESSURE: 131 MMHG | BODY MASS INDEX: 34.85 KG/M2 | WEIGHT: 236 LBS

## 2018-10-26 DIAGNOSIS — I10 BENIGN ESSENTIAL HYPERTENSION: ICD-10-CM

## 2018-10-26 PROCEDURE — G0463 HOSPITAL OUTPT CLINIC VISIT: HCPCS | Mod: ZF

## 2018-10-26 RX ORDER — NIFEDIPINE 60 MG/1
60 TABLET, EXTENDED RELEASE ORAL DAILY
Qty: 90 TABLET | Refills: 1 | Status: SHIPPED | OUTPATIENT
Start: 2018-10-26 | End: 2019-03-18

## 2018-10-26 NOTE — LETTER
10/26/2018       RE: Ruma Vega  [address] [facility] [facility] [address]     Dear Colleague,    Thank you for referring your patient, Ruma Vega, to the [facility]  at [facility]. Please see a copy of my visit note below.                           SUBJECTIVE:  Ruma Vega is a 38 year old female who comes in for HTN. Since last seen, has had a dry, choky throat, worse at night and in AM when not as hydrated. Does a lot of public speaking for work, so this is problematic. Also, she is producing 5-8oz less milk/day. Was making around 30 oz/day, now 22-23oz. Her son eats 25-28 oz/day. Also, it is taking longer to pump. She is pumping 60 min at a time (4-5x/day). Using medela PISA. Has trouble with nipple swelling if pumps more frequently. Also, she has higher milk production if she pumps fewer times during the day but for a longer time. Breasts do not feel empty after pumping. Also 2nd and third letdowns take a long time to start.       Medications and allergies reviewed by me today.     OBJECTIVE:    /89  Pulse 73  Wt 107 kg (236 lb)  Breastfeeding? Yes  BMI 34.85 kg/m2   Wt Readings from Last 1 Encounters:   10/26/18 107 kg (236 lb)     Gen: Pleasant female, in NAD       ASSESSMENT/PLAN:    Ruma was seen today for recheck. Given enalapril is causing possible decrease in supply, will stop this today. Will instead increase nifedipine to 60 mg daily. Also discussed pumping to help increase milk production again, including possible getting different pump, ensuring valves/membranes have been replaced, power pumping, and hand expression.     Diagnoses and all orders for this visit:    Benign essential hypertension  -     NIFEdipine ER osmotic (PROCARDIA XL) 60 MG TB24; Take 1 tablet (60 mg) by mouth daily       Pt should return to clinic for f/u with me in 2 weeks.     I spent a total of 25 min with this patient,  including >50% of the time spent in patient education and counseling.          Agueda Kay MD  10/26/18

## 2018-10-26 NOTE — MR AVS SNAPSHOT
After Visit Summary   10/26/2018    Ruma Vega    MRN: [id number]           Patient Information     Date Of Birth          1980        Visit Information        Provider Department      10/26/2018 11:30 AM Agueda Chun MD [facility]         Today's Diagnoses     Benign essential hypertension          Care Instructions      Power Pump    Pump for 20 min  Rest for 10  Pump for 10  Rest for 10  Pump for 10    Stop enalapril  Increase nifedipine to 60 mg daily.             Follow-ups after your visit        Your next 10 appointments already scheduled     Nov 12, 2018 11:30 AM CST   Return Visit with Agueda Kay MD   [facility]  ([facility])    [facility]  3rd [facility] [address]  [address]  [address] [address] [phone]              Who to contact     Please call your clinic at [phone] to:    Ask questions about your health    Make or cancel appointments    Discuss your medicines    Learn about your test results    Speak to your doctor            Additional Information About Your Visit        MyChart Information     [employer] gives you secure access to your electronic health record. If you see a primary care provider, you can also send messages to your care team and make appointments. If you have questions, please call your primary care clinic.  If you do not have a primary care provider, please call [phone] and they will assist you.      [employer] is an electronic gateway that provides easy, online access to your medical records. With [employer], you can request a clinic appointment, read your test results, renew a prescription or communicate with your care team.     To access your existing account, please contact your [facility] or call [phone] for assistance.        Care EveryWhere ID     This is your Care EveryWhere ID. This could  be used by other organizations to access your [place] medical records  [id number]        Your Vitals Were     Pulse Breastfeeding? BMI (Body Mass Index)             73 Yes 34.85 kg/m2          Blood Pressure from Last 3 Encounters:   10/26/18 131/89   10/12/18 (!) 147/100   10/08/18 (!) 157/100    Weight from Last 3 Encounters:   10/26/18 107 kg (236 lb)   10/12/18 106.6 kg (235 lb)   10/08/18 107.7 kg (237 lb 8 oz)              Today, you had the following     No orders found for display         Today's Medication Changes          These changes are accurate as of 10/26/18  2:27 PM.  If you have any questions, ask your nurse or doctor.               These medicines have changed or have updated prescriptions.        Dose/Directions    NIFEdipine ER osmotic 60 MG Tb24   Commonly known as:  PROCARDIA XL   This may have changed:    - medication strength  - how much to take   Used for:  Benign essential hypertension   Changed by:  Agueda Chun MD        Dose:  60 mg   Take 1 tablet (60 mg) by mouth daily   Quantity:  90 tablet   Refills:  1         Stop taking these medicines if you haven't already. Please contact your care team if you have questions.     enalapril 10 MG tablet   Commonly known as:  VASOTEC   Stopped by:  Agueda Chun MD                Where to get your medicines      These medications were sent to [facility]/pharmacy #[id number] - [place], [place] - [address] AT CORNER OF [address], [facility] [address]     Phone:  [phone]     NIFEdipine ER osmotic 60 MG Tb24                Primary Care Provider Office Phone # Fax #    Shannon Jerri Holdre [phone]       [facility] [address] [facility] [address]        Equal Access to Services     [facility] AH: Franck Parks, michelle thompson, luba reyes. So [facility] [phone].    ATENCIÓN: Jovana mckeon  español, tiene a figueroa disposición servicios gratuitos de asistencia lingüística. Avila roslaes [phone].    We comply with applicable federal civil rights laws and [place] laws. We do not discriminate on the basis of race, color, national origin, age, disability, sex, sexual orientation, or gender identity.            Thank you!     Thank you for choosing [facility]   for your care. Our goal is always to provide you with excellent care. Hearing back from our patients is one way we can continue to improve our services. Please take a few minutes to complete the written survey that you may receive in the mail after your visit with us. Thank you!             Your Updated Medication List - Protect others around you: Learn how to safely use, store and throw away your medicines at www.disposemymeds.org.          This list is accurate as of 10/26/18  2:27 PM.  Always use your most recent med list.                   Brand Name Dispense Instructions for use Diagnosis    FLONASE 50 MCG/ACT spray   Generic drug:  fluticasone     1 Package    Spray 1-2 sprays into both nostrils daily    Allergic rhinitis       FOLIC ACID PO      Take 800 mcg by mouth daily        ketotifen 0.025 % Soln ophthalmic solution    ZADITOR/REFRESH ANTI-ITCH     Place 1 drop into both eyes 2 times daily        mometasone 0.1 % cream    ELOCON     Apply topically daily        NIFEdipine ER osmotic 60 MG Tb24    PROCARDIA XL    90 tablet    Take 1 tablet (60 mg) by mouth daily    Benign essential hypertension       PRENATAL VITAMINS PO      Take  by mouth daily.    Flu vaccine need       psyllium 58.6 % Powd    METAMUCIL     Take by mouth daily        SINUS RINSE BOTTLE KIT NA      Spray 1 packet in nostril as needed        VITAMIN D-3 PO

## 2018-10-26 NOTE — PROGRESS NOTES
SUBJECTIVE:  Ruma Vega is a 38 year old female who comes in for HTN. Since last seen, has had a dry, choky throat, worse at night and in AM when not as hydrated. Does a lot of public speaking for work, so this is problematic. Also, she is producing 5-8oz less milk/day. Was making around 30 oz/day, now 22-23oz. Her son eats 25-28 oz/day. Also, it is taking longer to pump. She is pumping 60 min at a time (4-5x/day). Using medela PISA. Has trouble with nipple swelling if pumps more frequently. Also, she has higher milk production if she pumps fewer times during the day but for a longer time. Breasts do not feel empty after pumping. Also 2nd and third letdowns take a long time to start.       Medications and allergies reviewed by me today.     ROS:   Constitutional, HEENT, cardiovascular, pulmonary, gi and gu systems are negative, except as otherwise noted.    OBJECTIVE:    /89  Pulse 73  Wt 107 kg (236 lb)  Breastfeeding? Yes  BMI 34.85 kg/m2   Wt Readings from Last 1 Encounters:   10/26/18 107 kg (236 lb)     Gen: Pleasant female, in NAD       ASSESSMENT/PLAN:    Ruma was seen today for recheck. Given enalapril is causing possible decrease in supply, will stop this today. Will instead increase nifedipine to 60 mg daily. Also discussed pumping to help increase milk production again, including possible getting different pump, ensuring valves/membranes have been replaced, power pumping, and hand expression.     Diagnoses and all orders for this visit:    Benign essential hypertension  -     NIFEdipine ER osmotic (PROCARDIA XL) 60 MG TB24; Take 1 tablet (60 mg) by mouth daily       Pt should return to clinic for f/u with me in 2 weeks.     I spent a total of 25 min with this patient, including >50% of the time spent in patient education and counseling.          Agueda Kay MD  10/26/18

## 2018-10-26 NOTE — PATIENT INSTRUCTIONS
Power Pump    Pump for 20 min  Rest for 10  Pump for 10  Rest for 10  Pump for 10    Stop enalapril  Increase nifedipine to 60 mg daily.

## 2018-11-12 ENCOUNTER — OFFICE VISIT (OUTPATIENT)
Dept: INTERNAL MEDICINE | Facility: CLINIC | Age: 38
End: 2018-11-12
Attending: INTERNAL MEDICINE
Payer: COMMERCIAL

## 2018-11-12 VITALS
HEIGHT: 69 IN | DIASTOLIC BLOOD PRESSURE: 82 MMHG | BODY MASS INDEX: 35.24 KG/M2 | SYSTOLIC BLOOD PRESSURE: 114 MMHG | HEART RATE: 80 BPM | WEIGHT: 237.9 LBS

## 2018-11-12 DIAGNOSIS — I10 BENIGN ESSENTIAL HYPERTENSION: ICD-10-CM

## 2018-11-12 PROCEDURE — G0463 HOSPITAL OUTPT CLINIC VISIT: HCPCS | Mod: ZF

## 2018-11-12 ASSESSMENT — PAIN SCALES - GENERAL: PAINLEVEL: NO PAIN (0)

## 2018-11-12 NOTE — LETTER
"11/12/2018       RE: Ruma Vega  [address] [facility] [place] [address]     Dear Colleague,    Thank you for referring your patient, Ruma Vega, to the [facility]  at [facility]. Please see a copy of my visit note below.                               SUBJECTIVE:  Ruma Vega is a 38 year old female who comes in for f/u on BP. Doing much better today on 60 mg nifedipine. Milk supply has improved after stopping enalapril. Had 27 oz yesterday. Going back to work soon      Medications and allergies reviewed by me today.     ROS:   Constitutional, HEENT, cardiovascular, pulmonary, gi and gu systems are negative, except as otherwise noted.    OBJECTIVE:    /82  Pulse 80  Ht 1.753 m (5' 9\")  Wt 107.9 kg (237 lb 14.4 oz)  BMI 35.13 kg/m2   Wt Readings from Last 1 Encounters:   11/12/18 107.9 kg (237 lb 14.4 oz)     Gen: Pleasant female, in NAD  Resp: non-labored breathing  CV: Well-perfused  Neuro: AOx3, no focal deficits     ASSESSMENT/PLAN:    Ruma was seen today for recheck and hypertension.    Diagnoses and all orders for this visit:    Benign essential hypertension  -At goal, continue nifedipine       Pt should return to clinic for f/u with me in 1 year. Will need BMP at that time.      Agueda Kay MD  11/12/18    "

## 2018-11-12 NOTE — PROGRESS NOTES
"                           SUBJECTIVE:  Ruma Vega is a 38 year old female who comes in for f/u on BP. Doing much better today on 60 mg nifedipine. Milk supply has improved after stopping enalapril. Had 27 oz yesterday. Going back to work soon      Medications and allergies reviewed by me today.     ROS:   Constitutional, HEENT, cardiovascular, pulmonary, gi and gu systems are negative, except as otherwise noted.    OBJECTIVE:    /82  Pulse 80  Ht 1.753 m (5' 9\")  Wt 107.9 kg (237 lb 14.4 oz)  BMI 35.13 kg/m2   Wt Readings from Last 1 Encounters:   11/12/18 107.9 kg (237 lb 14.4 oz)     Gen: Pleasant female, in NAD  Resp: non-labored breathing  CV: Well-perfused  Neuro: AOx3, no focal deficits     ASSESSMENT/PLAN:    Ruma was seen today for recheck and hypertension.    Diagnoses and all orders for this visit:    Benign essential hypertension  -At goal, continue nifedipine       Pt should return to clinic for f/u with me in 1 year. Will need BMP at that time.      Agueda Kay MD  11/12/18    "

## 2018-11-12 NOTE — MR AVS SNAPSHOT
"              After Visit Summary   11/12/2018    Ruma Vega    MRN: [id number]           Patient Information     Date Of Birth          1980        Visit Information        Provider Department      11/12/2018 11:30 AM Agueda Chun MD [facility]         Today's Diagnoses     Benign essential hypertension          Care Instructions        MD Agnes Nick MD Kendahl Moser-Bleil, MD              Follow-ups after your visit        Who to contact     Please call your clinic at [phone] to:    Ask questions about your health    Make or cancel appointments    Discuss your medicines    Learn about your test results    Speak to your doctor            Additional Information About Your Visit        [employer]Visithart Information     [employer] gives you secure access to your electronic health record. If you see a primary care provider, you can also send messages to your care team and make appointments. If you have questions, please call your primary care clinic.  If you do not have a primary care provider, please call [phone] and they will assist you.      [employer] is an electronic gateway that provides easy, online access to your medical records. With [employer], you can request a clinic appointment, read your test results, renew a prescription or communicate with your care team.     To access your existing account, please contact your [facility] or call [phone] for assistance.        Care EveryWhere ID     This is your Care EveryWhere ID. This could be used by other organizations to access your [facility] medical records  [id number]        Your Vitals Were     Pulse Height BMI (Body Mass Index)             80 1.753 m (5' 9\") 35.13 kg/m2          Blood Pressure from Last 3 Encounters:   11/12/18 114/82   10/26/18 131/89   10/12/18 (!) 147/100    Weight from Last 3 Encounters:   11/12/18 107.9 kg (237 lb 14.4 oz)   10/26/18 107 " kg (236 lb)   10/12/18 106.6 kg (235 lb)              Today, you had the following     No orders found for display       Primary Care Provider Office Phone # Fax #    Shannon Jerri Holder [phone]       [facility] [address]  [facility] [address]        Equal Access to Services     [facility] CenterANJALI : Hadii aad ku hadasho Soomaali, waaxda luqadaha, qaybta kaalmada adeegyada, waxay matin hayaan adeeg khindukenna laPatyaan . So [facility] [phone].    ATENCIÓN: Si habla espkeiry, tiene a figueroa disposición servicios gratuitos de asistencia lingüística. Avila al [phone].    We comply with applicable federal civil rights laws and [place] laws. We do not discriminate on the basis of race, color, national origin, age, disability, sex, sexual orientation, or gender identity.            Thank you!     Thank you for choosing [facility]   for your care. Our goal is always to provide you with excellent care. Hearing back from our patients is one way we can continue to improve our services. Please take a few minutes to complete the written survey that you may receive in the mail after your visit with us. Thank you!             Your Updated Medication List - Protect others around you: Learn how to safely use, store and throw away your medicines at www.disposemymeds.org.          This list is accurate as of 11/12/18 12:24 PM.  Always use your most recent med list.                   Brand Name Dispense Instructions for use Diagnosis    FLONASE 50 MCG/ACT spray   Generic drug:  fluticasone     1 Package    Spray 1-2 sprays into both nostrils daily    Allergic rhinitis       FOLIC ACID PO      Take 800 mcg by mouth daily        ketotifen 0.025 % Soln ophthalmic solution    ZADITOR/REFRESH ANTI-ITCH     Place 1 drop into both eyes 2 times daily        mometasone 0.1 % cream    ELOCON     Apply topically daily        NIFEdipine ER osmotic 60 MG Tb24    PROCARDIA XL    90 tablet     Take 1 tablet (60 mg) by mouth daily    Benign essential hypertension       PRENATAL VITAMINS PO      Take  by mouth daily.    Flu vaccine need       psyllium 58.6 % Powd    METAMUCIL     Take by mouth daily        SINUS RINSE BOTTLE KIT NA      Spray 1 packet in nostril as needed        VITAMIN D-3 PO

## 2019-03-18 ENCOUNTER — MYC REFILL (OUTPATIENT)
Dept: INTERNAL MEDICINE | Facility: CLINIC | Age: 39
End: 2019-03-18

## 2019-03-18 DIAGNOSIS — I10 BENIGN ESSENTIAL HYPERTENSION: ICD-10-CM

## 2019-03-19 RX ORDER — NIFEDIPINE 60 MG/1
60 TABLET, EXTENDED RELEASE ORAL DAILY
Qty: 90 TABLET | Refills: 1 | Status: SHIPPED | OUTPATIENT
Start: 2019-03-19 | End: 2019-09-23

## 2019-03-19 NOTE — TELEPHONE ENCOUNTER
Received refill request for nifedipine. Patient recently seen and plan was to continue this medication and follow up with Dr. Angel in one year (11/2019). Rx sent.

## 2019-09-23 ENCOUNTER — OFFICE VISIT (OUTPATIENT)
Dept: INTERNAL MEDICINE | Facility: CLINIC | Age: 39
End: 2019-09-23
Attending: INTERNAL MEDICINE
Payer: COMMERCIAL

## 2019-09-23 VITALS
BODY MASS INDEX: 35.74 KG/M2 | HEART RATE: 83 BPM | WEIGHT: 241.3 LBS | SYSTOLIC BLOOD PRESSURE: 148 MMHG | HEIGHT: 69 IN | DIASTOLIC BLOOD PRESSURE: 100 MMHG

## 2019-09-23 DIAGNOSIS — Z13.220 SCREENING FOR HYPERLIPIDEMIA: ICD-10-CM

## 2019-09-23 DIAGNOSIS — G43.009 MIGRAINE WITHOUT AURA AND WITHOUT STATUS MIGRAINOSUS, NOT INTRACTABLE: ICD-10-CM

## 2019-09-23 DIAGNOSIS — Z00.00 ROUTINE PHYSICAL EXAMINATION: Primary | ICD-10-CM

## 2019-09-23 DIAGNOSIS — L30.9 ECZEMA OF BOTH HANDS: ICD-10-CM

## 2019-09-23 DIAGNOSIS — I10 BENIGN ESSENTIAL HYPERTENSION: ICD-10-CM

## 2019-09-23 LAB
ANION GAP SERPL CALCULATED.3IONS-SCNC: 7 MMOL/L (ref 3–14)
BUN SERPL-MCNC: 7 MG/DL (ref 7–30)
CALCIUM SERPL-MCNC: 8.8 MG/DL (ref 8.5–10.1)
CHLORIDE SERPL-SCNC: 103 MMOL/L (ref 94–109)
CHOLEST SERPL-MCNC: 211 MG/DL
CO2 SERPL-SCNC: 30 MMOL/L (ref 20–32)
CREAT SERPL-MCNC: 0.95 MG/DL (ref 0.52–1.04)
CREAT UR-MCNC: 23 MG/DL
GFR SERPL CREATININE-BSD FRML MDRD: 75 ML/MIN/{1.73_M2}
GLUCOSE SERPL-MCNC: 89 MG/DL (ref 70–99)
HDLC SERPL-MCNC: 49 MG/DL
LDLC SERPL CALC-MCNC: 124 MG/DL
NONHDLC SERPL-MCNC: 162 MG/DL
POTASSIUM SERPL-SCNC: 3.8 MMOL/L (ref 3.4–5.3)
PROT UR-MCNC: <0.05 G/L
PROT/CREAT 24H UR: NORMAL G/G CR (ref 0–0.2)
SODIUM SERPL-SCNC: 140 MMOL/L (ref 133–144)
TRIGL SERPL-MCNC: 189 MG/DL

## 2019-09-23 PROCEDURE — 80061 LIPID PANEL: CPT | Performed by: INTERNAL MEDICINE

## 2019-09-23 PROCEDURE — 84156 ASSAY OF PROTEIN URINE: CPT | Performed by: INTERNAL MEDICINE

## 2019-09-23 PROCEDURE — G0463 HOSPITAL OUTPT CLINIC VISIT: HCPCS | Mod: ZF

## 2019-09-23 PROCEDURE — 80048 BASIC METABOLIC PNL TOTAL CA: CPT | Performed by: INTERNAL MEDICINE

## 2019-09-23 PROCEDURE — 36415 COLL VENOUS BLD VENIPUNCTURE: CPT | Performed by: INTERNAL MEDICINE

## 2019-09-23 RX ORDER — MOMETASONE FUROATE 1 MG/G
CREAM TOPICAL
Qty: 15 G | Refills: 3 | Status: SHIPPED | OUTPATIENT
Start: 2019-09-23 | End: 2021-06-03

## 2019-09-23 RX ORDER — RIZATRIPTAN BENZOATE 5 MG/1
5 TABLET, ORALLY DISINTEGRATING ORAL
Qty: 12 TABLET | Refills: 5 | Status: SHIPPED | OUTPATIENT
Start: 2019-09-23 | End: 2021-06-03

## 2019-09-23 RX ORDER — NIFEDIPINE 60 MG/1
60 TABLET, EXTENDED RELEASE ORAL DAILY
Qty: 90 TABLET | Refills: 3 | Status: SHIPPED | OUTPATIENT
Start: 2019-09-23 | End: 2019-12-17

## 2019-09-23 ASSESSMENT — PATIENT HEALTH QUESTIONNAIRE - PHQ9
SUM OF ALL RESPONSES TO PHQ QUESTIONS 1-9: 1
5. POOR APPETITE OR OVEREATING: NOT AT ALL

## 2019-09-23 ASSESSMENT — PAIN SCALES - GENERAL: PAINLEVEL: NO PAIN (0)

## 2019-09-23 ASSESSMENT — ANXIETY QUESTIONNAIRES
7. FEELING AFRAID AS IF SOMETHING AWFUL MIGHT HAPPEN: NOT AT ALL
6. BECOMING EASILY ANNOYED OR IRRITABLE: NOT AT ALL
GAD7 TOTAL SCORE: 0
2. NOT BEING ABLE TO STOP OR CONTROL WORRYING: NOT AT ALL
1. FEELING NERVOUS, ANXIOUS, OR ON EDGE: NOT AT ALL
3. WORRYING TOO MUCH ABOUT DIFFERENT THINGS: NOT AT ALL
5. BEING SO RESTLESS THAT IT IS HARD TO SIT STILL: NOT AT ALL
IF YOU CHECKED OFF ANY PROBLEMS ON THIS QUESTIONNAIRE, HOW DIFFICULT HAVE THESE PROBLEMS MADE IT FOR YOU TO DO YOUR WORK, TAKE CARE OF THINGS AT HOME, OR GET ALONG WITH OTHER PEOPLE: NOT DIFFICULT AT ALL

## 2019-09-23 ASSESSMENT — MIFFLIN-ST. JEOR: SCORE: 1828.91

## 2019-09-23 NOTE — LETTER
2019       RE: Ruma Vega  [address] [facility] [address] [facility] [address]     Dear Colleague,    Thank you for referring your patient, Ruma Vega, to the [facility]  at [facility]. Please see a copy of my visit note below.                           SUBJECTIVE:  Ruma Vega is a 39 year old female with pmh of gestational HTN, now chronic HTN who comes in for comes in for annual physical. HAs not been checking her BP at home. Also having headaches, feels it may be migraines. Did keep track of these for a period of time in  (3) and July (5). Has gotten sick with them. Come on and intensify within a couple hours. Has gotten nauseated and vomited. The pain is in her temples or forehead. If took Excedrin migraine within the first 2 hrs, it would go away. Tended to be later in the week. Uncertain if it was related to seasonal allergies. Had photophobia and motion sensitivity. No other neurological symptoms. No changes in vision.     Would like to wait on flu vaccine until October for more immunity.     Also has a rash on L neck. Similar to when she takes antibiotics, but hasn't had anything different recently. Very itchy.     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 Maternal Grandfather      Hypertension Maternal Grandfather      Diabetes Maternal Grandmother      Kidney Disease Father          "transplant     Breast Cancer Maternal Aunt      Breast Cancer Maternal Aunt      Other Cancer Maternal Aunt         prancreatic ca     Breast Cancer Paternal Grandmother      Infertility Sister         currently pregnant wtih IVF       Soc Hx: work is going well, walked every day a couple miles. Son is now 1 yr old.     OB/GYN: She and her  are not interested in another child at this time. Is considering a Mirena IUD. Has appt in October with Dr. Wheeler. Pap last done in 2015, next due 2020.     Medications and allergies reviewed by me today.       Review Of Systems    10 point ROS of systems including Constitutional, Eyes, Respiratory, Cardiovascular, Pulmonary, Gastroenterology, Genitourinary, Integumentary, Musculoskeletal, Psychiatric were all negative except for pertinent positives noted in my HPI.    OBJECTIVE:    BP (!) 148/100   Pulse 83   Ht 1.753 m (5' 9\")   Wt 109.5 kg (241 lb 4.8 oz)   LMP 09/10/2019   BMI 35.63 kg/m      Wt Readings from Last 1 Encounters:   09/23/19 109.5 kg (241 lb 4.8 oz)       General: Pleasant female, in NAD  ENT: TMs normal bilaterally, oropharynx clear, MMM  Neck:  No LAD, no thyromegaly, no carotid bruits  Resp: lungs CAB  CV: Heart RRR, no MRG  Abd: Soft, NT, ND, nl bowel sounds, no HSM  Ext: WWP, no LE edema  Skin: warm, dry, maculopapular rash on L neck  Neuro: AOX3, no focal deficits     ASSESSMENT/PLAN:    Ruma was seen today for physical.    Diagnoses and all orders for this visit:    Routine physical examination  -     Creatinine urine calculation only    Benign essential hypertension. BP elevated today. Will check BP over next week and send me a [employer] message. If elevated, will likely add enalapril then repeat BP with OB/GYN visit in October.   -     Protein  random urine with Creat Ratio  -     NIFEdipine ER OSMOTIC (PROCARDIA XL) 60 MG 24 hr tablet; Take 1 tablet (60 mg) by mouth daily  -     Basic Metabolic Panel    Screening for hyperlipidemia  -     " Lipid Panel    Eczema of both hands  -     mometasone (ELOCON) 0.1 % external cream; Apply thin layer to affected areas 1 to 2 times daily as needed.    Migraine without aura and without status migrainosus, not intractable  -     rizatriptan (MAXALT-MLT) 5 MG ODT; Take 1 tablet (5 mg) by mouth at onset of headache for migraine May repeat in 2 hours. Max 6 tablets/24 hours.    Rash. Likely allergic reaction to something. Can use steroid cream (above) and also zyrtec or other antihistamine.     Pt should return to clinic for f/u with me in PRN        Agueda Kay MD  09/23/19

## 2019-09-23 NOTE — PATIENT INSTRUCTIONS
Blood pressure:   Check at home 1-2 times daily  Send me a [employer] message next week with your blood pressure readings  If elevated, will add enalapril. Follow-up blood pressure check when you see Dr. Wheeler.     Rash:   Can use steroid cream  Zyrtec, Allegra, or Benadryl    Migraines:   Maxalt as needd

## 2019-09-23 NOTE — PROGRESS NOTES
SUBJECTIVE:  Ruma Vega is a 39 year old female with pmh of gestational HTN, now chronic HTN who comes in for comes in for annual physical. HAs not been checking her BP at home. Also having headaches, feels it may be migraines. Did keep track of these for a period of time in  (3) and July (5). Has gotten sick with them. Come on and intensify within a couple hours. Has gotten nauseated and vomited. The pain is in her temples or forehead. If took Excedrin migraine within the first 2 hrs, it would go away. Tended to be later in the week. Uncertain if it was related to seasonal allergies. Had photophobia and motion sensitivity. No other neurological symptoms. No changes in vision.     Would like to wait on flu vaccine until October for more immunity.     Also has a rash on L neck. Similar to when she takes antibiotics, but hasn't had anything different recently. Very itchy.     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 Maternal Grandfather      Hypertension Maternal Grandfather      Diabetes Maternal Grandmother      Kidney Disease Father         transplant     Breast Cancer Maternal Aunt      Breast Cancer Maternal Aunt      Other Cancer Maternal Aunt         prancreatic ca     Breast Cancer Paternal Grandmother      Infertility Sister         currently pregnant wt IVF       Soc Hx: work is going well, walked every day a couple miles. Son  "is now 1 yr old.     OB/GYN: She and her  are not interested in another child at this time. Is considering a Mirena IUD. Has appt in October with Dr. Wheeler. Pap last done in 2015, next due 2020.     Medications and allergies reviewed by me today.       Review Of Systems    10 point ROS of systems including Constitutional, Eyes, Respiratory, Cardiovascular, Pulmonary, Gastroenterology, Genitourinary, Integumentary, Musculoskeletal, Psychiatric were all negative except for pertinent positives noted in my HPI.    OBJECTIVE:    BP (!) 148/100   Pulse 83   Ht 1.753 m (5' 9\")   Wt 109.5 kg (241 lb 4.8 oz)   LMP 09/10/2019   BMI 35.63 kg/m     Wt Readings from Last 1 Encounters:   09/23/19 109.5 kg (241 lb 4.8 oz)       General: Pleasant female, in NAD  ENT: TMs normal bilaterally, oropharynx clear, MMM  Neck:  No LAD, no thyromegaly, no carotid bruits  Resp: lungs CAB  CV: Heart RRR, no MRG  Abd: Soft, NT, ND, nl bowel sounds, no HSM  Ext: WWP, no LE edema  Skin: warm, dry, maculopapular rash on L neck  Neuro: AOX3, no focal deficits     ASSESSMENT/PLAN:    Ruma was seen today for physical.    Diagnoses and all orders for this visit:    Routine physical examination  -     Creatinine urine calculation only    Benign essential hypertension. BP elevated today. Will check BP over next week and send me a [employer] message. If elevated, will likely add enalapril then repeat BP with OB/GYN visit in October.   -     Protein  random urine with Creat Ratio  -     NIFEdipine ER OSMOTIC (PROCARDIA XL) 60 MG 24 hr tablet; Take 1 tablet (60 mg) by mouth daily  -     Basic Metabolic Panel    Screening for hyperlipidemia  -     Lipid Panel    Eczema of both hands  -     mometasone (ELOCON) 0.1 % external cream; Apply thin layer to affected areas 1 to 2 times daily as needed.    Migraine without aura and without status migrainosus, not intractable  -     rizatriptan (MAXALT-MLT) 5 MG ODT; Take 1 tablet (5 mg) by mouth at " onset of headache for migraine May repeat in 2 hours. Max 6 tablets/24 hours.    Rash. Likely allergic reaction to something. Can use steroid cream (above) and also zyrtec or other antihistamine.     Pt should return to clinic for f/u with me in PRN        Agueda Kay MD  09/23/19

## 2019-09-24 ASSESSMENT — ANXIETY QUESTIONNAIRES: GAD7 TOTAL SCORE: 0

## 2019-10-02 ENCOUNTER — HEALTH MAINTENANCE LETTER (OUTPATIENT)
Age: 39
End: 2019-10-02

## 2019-10-07 ENCOUNTER — MYC MEDICAL ADVICE (OUTPATIENT)
Dept: INTERNAL MEDICINE | Facility: CLINIC | Age: 39
End: 2019-10-07

## 2019-10-07 DIAGNOSIS — I10 BENIGN ESSENTIAL HYPERTENSION: Primary | ICD-10-CM

## 2019-10-08 RX ORDER — LISINOPRIL 5 MG/1
5 TABLET ORAL DAILY
Qty: 90 TABLET | Refills: 3 | Status: SHIPPED | OUTPATIENT
Start: 2019-10-08 | End: 2019-11-25

## 2019-10-17 ENCOUNTER — MYC REFILL (OUTPATIENT)
Dept: INTERNAL MEDICINE | Facility: CLINIC | Age: 39
End: 2019-10-17

## 2019-10-17 DIAGNOSIS — L30.9 ECZEMA OF BOTH HANDS: ICD-10-CM

## 2019-10-17 DIAGNOSIS — I10 BENIGN ESSENTIAL HYPERTENSION: ICD-10-CM

## 2019-10-17 DIAGNOSIS — G43.009 MIGRAINE WITHOUT AURA AND WITHOUT STATUS MIGRAINOSUS, NOT INTRACTABLE: ICD-10-CM

## 2019-10-17 RX ORDER — LISINOPRIL 5 MG/1
5 TABLET ORAL DAILY
Qty: 90 TABLET | Refills: 3 | Status: CANCELLED | OUTPATIENT
Start: 2019-10-17

## 2019-10-17 RX ORDER — NIFEDIPINE 60 MG/1
60 TABLET, EXTENDED RELEASE ORAL DAILY
Qty: 90 TABLET | Refills: 3 | Status: CANCELLED | OUTPATIENT
Start: 2019-10-17

## 2019-10-17 RX ORDER — RIZATRIPTAN BENZOATE 5 MG/1
5 TABLET, ORALLY DISINTEGRATING ORAL
Qty: 12 TABLET | Refills: 5 | Status: CANCELLED | OUTPATIENT
Start: 2019-10-17

## 2019-10-17 RX ORDER — MOMETASONE FUROATE 1 MG/G
CREAM TOPICAL
Qty: 15 G | Refills: 3 | Status: CANCELLED | OUTPATIENT
Start: 2019-10-17

## 2019-10-24 ENCOUNTER — OFFICE VISIT (OUTPATIENT)
Dept: OBGYN | Facility: CLINIC | Age: 39
End: 2019-10-24
Attending: OBSTETRICS & GYNECOLOGY
Payer: COMMERCIAL

## 2019-10-24 VITALS
HEART RATE: 85 BPM | BODY MASS INDEX: 35.4 KG/M2 | HEIGHT: 69 IN | WEIGHT: 239 LBS | DIASTOLIC BLOOD PRESSURE: 95 MMHG | SYSTOLIC BLOOD PRESSURE: 145 MMHG

## 2019-10-24 DIAGNOSIS — Z12.39 BREAST CANCER SCREENING: ICD-10-CM

## 2019-10-24 DIAGNOSIS — Z01.419 ENCOUNTER FOR GYNECOLOGICAL EXAMINATION WITHOUT ABNORMAL FINDING: ICD-10-CM

## 2019-10-24 ASSESSMENT — PATIENT HEALTH QUESTIONNAIRE - PHQ9
SUM OF ALL RESPONSES TO PHQ QUESTIONS 1-9: 1
5. POOR APPETITE OR OVEREATING: NOT AT ALL

## 2019-10-24 ASSESSMENT — ANXIETY QUESTIONNAIRES
2. NOT BEING ABLE TO STOP OR CONTROL WORRYING: NOT AT ALL
3. WORRYING TOO MUCH ABOUT DIFFERENT THINGS: NOT AT ALL
7. FEELING AFRAID AS IF SOMETHING AWFUL MIGHT HAPPEN: NOT AT ALL
GAD7 TOTAL SCORE: 0
6. BECOMING EASILY ANNOYED OR IRRITABLE: NOT AT ALL
5. BEING SO RESTLESS THAT IT IS HARD TO SIT STILL: NOT AT ALL
1. FEELING NERVOUS, ANXIOUS, OR ON EDGE: NOT AT ALL

## 2019-10-24 ASSESSMENT — MIFFLIN-ST. JEOR: SCORE: 1818.48

## 2019-10-24 NOTE — PROGRESS NOTES
Gynecology Visit Note  10/24/19    Reason for visit: Annual exam    HPI: Patient is a 38 yo  with history of Factor V Leiden heterozygosity, history of kidney donation to her father and history of gestational HTN who presents today for annual well woman exam.  Jose Manuel now 1 year old (delivered 2018).  Ruma is doing well.  She has been working on her HTN control, now on Nifedipine and Lisinopril.  Patient playing more tennis again.  She states from an OB/GYN perspective she is doing well without many concerns.  She and Nicola have decided to not have any more children and so she has plans for Mirena IUD insertion in near future for LARC.    OB/GYN History:  : CS x 1 for macrosomia, 4 prior losses  Menses: Regular every month, 3-5 days, moderate flow, no intermenstrual bleeding  Contraception: Condoms/NFP, planning on Mirena IUD  Pap smear history: Last 7/21/15 NILM and HPV negative no abnormal pap history, next 2020  No STI history  Sexually active with   Denies any dyspareunia or vaginal discharge    Past Medical History:   Diagnosis Date     Factor V Leiden (H)      Gestational HTN      Hypertension      Rhinitis, allergic      Past Surgical History:   Procedure Laterality Date      SECTION N/A 2018    Procedure:  SECTION;;  Surgeon: Agnes Wheeler MD;  [facility]     DILATION AND CURETTAGE SUCTION N/A 10/31/2015    Procedure: DILATION AND CURETTAGE SUCTION;  Surgeon: Agnes Wheeler MD;  [facility]     EXTRACTION(S) DENTAL       NEPHRECTOMY  2010    left; donor to father     Cholecalciferol (VITAMIN D-3 PO),   fluticasone (FLONASE) 50 MCG/ACT nasal spray, Spray 1-2 sprays into both nostrils daily  FOLIC ACID PO, Take 800 mcg by mouth daily  Hypertonic Nasal Wash (SINUS RINSE BOTTLE KIT NA), Spray 1 packet in nostril as needed  ketotifen (ZADITOR/REFRESH ANTI-ITCH) 0.025 % SOLN, Place 1 drop into both eyes 2 times daily  lisinopril  "(PRINIVIL/ZESTRIL) 5 MG tablet, Take 1 tablet (5 mg) by mouth daily  mometasone (ELOCON) 0.1 % external cream, Apply thin layer to affected areas 1 to 2 times daily as needed.  NIFEdipine ER OSMOTIC (PROCARDIA XL) 60 MG 24 hr tablet, Take 1 tablet (60 mg) by mouth daily  PRENATAL VITAMINS PO, Take  by mouth daily.  psyllium (METAMUCIL) 58.6 % POWD, Take by mouth daily  rizatriptan (MAXALT-MLT) 5 MG ODT, Take 1 tablet (5 mg) by mouth at onset of headache for migraine May repeat in 2 hours. Max 6 tablets/24 hours.    No current facility-administered medications on file prior to visit.        Allergies   Allergen Reactions     Erythromycin      Penicillins      Sulfa Drugs      Cefdinir Hives and Rash     Social History     Tobacco Use     Smoking status: Never Smoker     Smokeless tobacco: Never Used   Substance Use Topics     Alcohol use: No     Alcohol/week: 0.0 - 1.0 standard drinks     Comment: atopped in pregnancy     Drug use: No     Family History   Problem Relation Age of Onset     C.A.D. Maternal Grandfather      Hypertension Maternal Grandfather      Diabetes Maternal Grandmother      Kidney Disease Father         transplant     Breast Cancer Maternal Aunt      Breast Cancer Maternal Aunt      Other Cancer Maternal Aunt         prancreatic ca     Breast Cancer Paternal Grandmother      Infertility Sister         currently pregnant wtih IVF     ROS: A complete 10 point ROS was completed and was negative aside from that noted in the HPI    O:  BP (!) 145/95   Pulse 85   Ht 1.753 m (5' 9\")   Wt 108.4 kg (239 lb)   LMP 10/05/2019   BMI 35.29 kg/m     General: NAD, A&Ox3  Neck: No thyromegaly, No thyroid nodules appreciated  Lungs: CTA B/L  CV: RRR  Breasts: Symmetrical, No lymphadenopathy, skin changes, nipple discharge or nodules appreciated bilaterally  Abdomen: Soft, NT, ND  Genitourinary:   External Genitalia:  General appearance; normal, Hair distribution; normal, Lesions absent  Urethral Meatus:  Size " normal, Location normal, Lesions absent  Urethra:  Fullness absent, Masses absent  Bladder:  Fullness absent, Masses absent, Tenderness absent  Vagina:  General appearance normal, Estrogen effect normal, Discharge absent, Lesions absent  Cervix:  General appearance normal, Lesions absent, Discharge absent, Tenderness absent, Enlargement absent  Uterus:  Size normal, Contour normal, Masses absent, Tenderness absent  Adenexa:  Masses absent, Tenderness absent, Enlargement absent     A/P: 38 yo  presents for annual well woman exam  1) Normal breast and pelvic exam  2) Screening for malignant neoplasm of cervix: Next due 7/2020, pap with cotesting  3) Breast cancer screening: mammogram ordered today  4) Contraception: Plans to get Mirena IUD as has worked well in past.  Was waiting until she and  made definite decision about further children and they have now decided against future children.  She will make an appointment for Mirena IUD in the near future.      Agnes Wheeler MD

## 2019-10-24 NOTE — LETTER
10/24/2019       RE: Ruma Vega  [address] [facility] [address]     Dear Colleague,    Thank you for referring your patient, Ruma Vega, to the [facility] at [facility]. Please see a copy of my visit note below.    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  Jose Manuel now 1 year old (delivered 2018).  Ruma is doing well.  She has been working on her HTN control, now on Nifedipine and Lisinopril.  Patient playing more tennis again.  She states from an OB/GYN perspective she is doing well without many concerns.  She and Nicola have decided to not have any more children and so she has plans for Mirena IUD insertion in near future for [facility].    OB/GYN History:  : CS x 1 for macrosomia, 4 prior losses  Menses: Regular every month, 3-5 days, moderate flow, no intermenstrual bleeding  Contraception: Condoms/NFP, planning on Mirena IUD  Pap smear history: Last 7/21/15 NILM and HPV negative no abnormal pap history, next 2020  No STI history  Sexually active with   Denies any dyspareunia or vaginal discharge    Past Medical History:   Diagnosis Date     Factor V Leiden (H)      Gestational HTN      Hypertension      Rhinitis, allergic      Past Surgical History:   Procedure Laterality Date      SECTION N/A 2018    Procedure:  SECTION;;  Surgeon: Agnes Wheeler MD;  Location: [facility] L+D     DILATION AND CURETTAGE SUCTION N/A 10/31/2015    Procedure: DILATION AND CURETTAGE SUCTION;  Surgeon: Agnes Wheeler MD;  Location: [facility] OR     EXTRACTION(S) DENTAL       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  NIFEdipine ER OSMOTIC (PROCARDIA XL) 60 MG 24 hr tablet, Take 1 tablet (60 mg) by mouth daily  PRENATAL VITAMINS PO, Take  by mouth daily.  psyllium (METAMUCIL) 58.6 % POWD, Take by mouth daily  rizatriptan (MAXALT-MLT) 5 MG ODT, Take 1 tablet (5 mg) by mouth at onset of headache for migraine May repeat in 2 hours. Max 6 tablets/24 hours.    No current facility-administered medications on file prior to visit.        Allergies   Allergen Reactions     Erythromycin      Penicillins      Sulfa Drugs      Cefdinir Hives and Rash     Social History     Tobacco Use     Smoking status: Never Smoker     Smokeless tobacco: Never Used   Substance Use Topics     Alcohol use: No     Alcohol/week: 0.0 - 1.0 standard drinks     Comment: atopped in pregnancy     Drug use: No     Family History   Problem Relation Age of Onset     C.A.D. Maternal Grandfather      Hypertension Maternal Grandfather      Diabetes Maternal Grandmother      Kidney Disease Father         transplant     Breast Cancer Maternal Aunt      Breast Cancer Maternal Aunt      Other Cancer Maternal Aunt         prancreatic ca     Breast Cancer Paternal Grandmother      Infertility Sister         currently pregnant wt IVF     ROS: A complete 10 point ROS was completed and was negative aside from that noted in the HPI    O:  BP (!) 145/95   Pulse 85   Ht 1.753 m (5' 9\")   Wt 108.4 kg (239 lb)   LMP 10/05/2019   BMI 35.29 kg/m      General: NAD, A&Ox3  Neck: No thyromegaly, No thyroid nodules " appreciated  Lungs: CTA B/L  CV: RRR  Breasts: Symmetrical, No lymphadenopathy, skin changes, nipple discharge or nodules appreciated bilaterally  Abdomen: Soft, NT, ND  Genitourinary:   External Genitalia:  General appearance; normal, Hair distribution; normal, Lesions absent  Urethral Meatus:  Size normal, Location normal, Lesions absent  Urethra:  Fullness absent, Masses absent  Bladder:  Fullness absent, Masses absent, Tenderness absent  Vagina:  General appearance normal, Estrogen effect normal, Discharge absent, Lesions absent  Cervix:  General appearance normal, Lesions absent, Discharge absent, Tenderness absent, Enlargement absent  Uterus:  Size normal, Contour normal, Masses absent, Tenderness absent  Adenexa:  Masses absent, Tenderness absent, Enlargement absent     A/P: 40 yo  presents for annual well woman exam  1) Normal breast and pelvic exam  2) Screening for malignant neoplasm of cervix: Next due 7/2020, pap with cotesting  3) Breast cancer screening: mammogram ordered today  4) Contraception: Plans to get Mirena IUD as has worked well in past.  Was waiting until she and  made definite decision about further children and they have now decided against future children.  She will make an appointment for Mirena IUD in the near future.      Agnes Wheeler MD

## 2019-10-25 ASSESSMENT — ANXIETY QUESTIONNAIRES: GAD7 TOTAL SCORE: 0

## 2019-11-04 ENCOUNTER — OFFICE VISIT (OUTPATIENT)
Dept: OBGYN | Facility: CLINIC | Age: 39
End: 2019-11-04
Attending: OBSTETRICS & GYNECOLOGY
Payer: COMMERCIAL

## 2019-11-04 DIAGNOSIS — Z30.430 ENCOUNTER FOR IUD INSERTION: Primary | ICD-10-CM

## 2019-11-04 PROCEDURE — G0463 HOSPITAL OUTPT CLINIC VISIT: HCPCS | Mod: ZF

## 2019-11-04 PROCEDURE — G0008 ADMIN INFLUENZA VIRUS VAC: HCPCS | Mod: ZF

## 2019-11-04 PROCEDURE — 25000125 ZZHC RX 250: Mod: ZF | Performed by: OBSTETRICS & GYNECOLOGY

## 2019-11-04 PROCEDURE — 90686 IIV4 VACC NO PRSV 0.5 ML IM: CPT | Mod: ZF

## 2019-11-04 PROCEDURE — 25000128 H RX IP 250 OP 636: Mod: ZF

## 2019-11-04 PROCEDURE — 58300 INSERT INTRAUTERINE DEVICE: CPT | Mod: ZF | Performed by: OBSTETRICS & GYNECOLOGY

## 2019-11-04 NOTE — PROGRESS NOTES
Gynecology Visit Note  19    Reason for visit: Mirena IUD insertion    S: Patient is a 40 yo  who was recently seen for her annual exam and at that time expressed desire for Mirena IUD.  She presents today to have this placed.  No other questions/concerns.  Desires flu shot today.    O:  LMP 10/05/2019      General: NAD, A&OX3  Resp: Non-labored breathing    PROCEDURE NOTE  --  Mirena Insertion  Verification of Procedure:  Just before the procedure begans through verbal and active participation of team members, I verified:     Initials   Patient Name SLH   Patient  SLH   Procedure to be performed [facility]     Consent:  Risks, benefits of treatment, and alternative options for contraception were discussed.  Patient's questions were elicited and answered.  Written consent was obtained and scanned into medical record.     Reason for Insertion:  contraception.    Pregnancy test: Negative    Counseling:  Patient counseled on efficacy, benefits, risks, potential side effects of IUD.  Insertion procedure and risk of infection, perforation, and spontaneous expulsion reviewed.   Advised to plan removal and/or replacement of IUD in 5 years from today or when desired.       Ruma was pre-medicated with nothing prior to beginning the procedure.      Under sterile technique, cervix was visualized with a medium Graves speculum and prepped with Betadine solution. The uterus was sounded to 10 cm. The Mirena IUD insertion apparatus was prepared and placed through the cervix without significant resistance and deployed at the fundus in the usual fashion. The strings were trimmed 3 cm from the external os.      Device Lot #: [id number]  Device Expiration Date: 2022     EBL:  Minimal     Complications: None      Ruma Vega tolerated procedure well.    A/P: 40 yo  presents for Mirena IUD insertion  1) IUD insertion: Mirena placed today without complication.  Written information on IUD use reviewed and given.   Symptoms to report reviewed. To report heavy bleeding, severe cramping, or abnormal vaginal discharge.  May take Ibuprofen 400-800 mg PO TID PRN or Naproxen 500 mg PO BID for cramping. Reminded to check for IUD strings every month. Patient has been counseled to use backup birth control method for 1 week.  Return to clinic in 4 weeks for string check. Ruma Vega verbalized understanding of instructions.    Agnes Wheeler MD

## 2019-11-04 NOTE — LETTER
2019       RE: Ruma Vega  [address] [facility] [address]     Dear Colleague,    Thank you for referring your patient, Ruma Vega, to the [facility] at [facility]. Please see a copy of my visit note below.    Gynecology Visit Note  19    Reason for visit: Mirena IUD insertion    S: Patient is a 40 yo  who was recently seen for her annual exam and at that time expressed desire for Mirena IUD.  She presents today to have this placed.  No other questions/concerns.  Desires flu shot today.    O:  LMP 10/05/2019      General: NAD, A&OX3  Resp: Non-labored breathing    PROCEDURE NOTE  --  Mirena Insertion  Verification of Procedure:  Just before the procedure begans through verbal and active participation of team members, I verified:     Initials   Patient Name SLH   Patient  SLH   Procedure to be performed [facility]     Consent:  Risks, benefits of treatment, and alternative options for contraception were discussed.  Patient's questions were elicited and answered.  Written consent was obtained and scanned into medical record.     Reason for Insertion:  contraception.    Pregnancy test: Negative    Counseling:  Patient counseled on efficacy, benefits, risks, potential side effects of IUD.  Insertion procedure and risk of infection, perforation, and spontaneous expulsion reviewed.   Advised to plan removal and/or replacement of IUD in 5 years from today or when desired.       Ruma was pre-medicated with nothing prior to beginning the procedure.      Under sterile technique, cervix was visualized with a medium Graves speculum and prepped with Betadine solution. The uterus was sounded to 10 cm. The Mirena IUD insertion apparatus was prepared and placed through the cervix without significant resistance and deployed at the fundus in the usual fashion. The strings were trimmed 3 cm from the external os.      Device Lot #:  [id number]  Device Expiration Date: JAN 2022     EBL:  Minimal     Complications: None      Ruma Fabiola Vega tolerated procedure well.    A/P: 40 yo  presents for Mirena IUD insertion  1) IUD insertion: Mirena placed today without complication.  Written information on IUD use reviewed and given.  Symptoms to report reviewed. To report heavy bleeding, severe cramping, or abnormal vaginal discharge.  May take Ibuprofen 400-800 mg PO TID PRN or Naproxen 500 mg PO BID for cramping. Reminded to check for IUD strings every month. Patient has been counseled to use backup birth control method for 1 week.  Return to clinic in 4 weeks for string check. Ruma Vega verbalized understanding of instructions.    Agnes Wheeler MD

## 2019-11-05 PROCEDURE — 25000125 ZZHC RX 250: Mod: ZF | Performed by: OBSTETRICS & GYNECOLOGY

## 2019-11-05 RX ADMIN — LEVONORGESTREL 20 MCG: 52 INTRAUTERINE DEVICE INTRAUTERINE at 10:22

## 2019-11-20 ENCOUNTER — ANCILLARY PROCEDURE (OUTPATIENT)
Dept: MAMMOGRAPHY | Facility: CLINIC | Age: 39
End: 2019-11-20
Attending: OBSTETRICS & GYNECOLOGY
Payer: COMMERCIAL

## 2019-11-20 DIAGNOSIS — Z12.39 BREAST CANCER SCREENING: ICD-10-CM

## 2019-11-25 ENCOUNTER — OFFICE VISIT (OUTPATIENT)
Dept: INTERNAL MEDICINE | Facility: CLINIC | Age: 39
End: 2019-11-25
Attending: INTERNAL MEDICINE
Payer: COMMERCIAL

## 2019-11-25 VITALS
WEIGHT: 237 LBS | BODY MASS INDEX: 35 KG/M2 | SYSTOLIC BLOOD PRESSURE: 141 MMHG | DIASTOLIC BLOOD PRESSURE: 94 MMHG | HEART RATE: 80 BPM

## 2019-11-25 DIAGNOSIS — I10 ESSENTIAL HYPERTENSION: Primary | ICD-10-CM

## 2019-11-25 RX ORDER — LOSARTAN POTASSIUM 50 MG/1
50 TABLET ORAL DAILY
Qty: 30 TABLET | Refills: 3 | Status: SHIPPED | OUTPATIENT
Start: 2019-11-25 | End: 2019-12-17

## 2019-11-25 ASSESSMENT — ANXIETY QUESTIONNAIRES
GAD7 TOTAL SCORE: 0
6. BECOMING EASILY ANNOYED OR IRRITABLE: NOT AT ALL
1. FEELING NERVOUS, ANXIOUS, OR ON EDGE: NOT AT ALL
5. BEING SO RESTLESS THAT IT IS HARD TO SIT STILL: NOT AT ALL
7. FEELING AFRAID AS IF SOMETHING AWFUL MIGHT HAPPEN: NOT AT ALL
2. NOT BEING ABLE TO STOP OR CONTROL WORRYING: NOT AT ALL
3. WORRYING TOO MUCH ABOUT DIFFERENT THINGS: NOT AT ALL

## 2019-11-25 ASSESSMENT — PATIENT HEALTH QUESTIONNAIRE - PHQ9
SUM OF ALL RESPONSES TO PHQ QUESTIONS 1-9: 0
5. POOR APPETITE OR OVEREATING: NOT AT ALL

## 2019-11-25 ASSESSMENT — PAIN SCALES - GENERAL: PAINLEVEL: NO PAIN (0)

## 2019-11-25 NOTE — PROGRESS NOTES
HPI  Patient is here for follow up on hypertension. She reports that she is not planning additional pregnancies at this time. She reports dry cough, wondering if it is related to lisinopril.     Review of Systems     Constitutional:  Negative for fever, chills and fatigue.   Respiratory:   Positive for cough. Negative for shortness of breath, wheezing and respiratory pain.    Cardiovascular:  Negative for chest pain and edema.   Skin:  Negative for itching and rash.   Neurological:  Negative for weakness.   Endo/Heme:  Negative for anemia, swollen glands and bruises/bleeds easily.   Psychiatric/Behavioral:  Negative for depression, decreased concentration, mood swings and panic attacks.    Endocrine:  Negative for altered temperature regulation, polyphagia, polydipsia, unwanted hair growth and change in facial hair.    Current Outpatient Medications   Medication     Cholecalciferol (VITAMIN D-3 PO)     fluticasone (FLONASE) 50 MCG/ACT nasal spray     FOLIC ACID PO     Hypertonic Nasal Wash (SINUS RINSE BOTTLE KIT NA)     ketotifen (ZADITOR/REFRESH ANTI-ITCH) 0.025 % SOLN     mometasone (ELOCON) 0.1 % external cream     NIFEdipine ER OSMOTIC (PROCARDIA XL) 60 MG 24 hr tablet     PRENATAL VITAMINS PO     psyllium (METAMUCIL) 58.6 % POWD     rizatriptan (MAXALT-MLT) 5 MG ODT     No current facility-administered medications for this visit.      Vitals:    11/25/19 1008 11/25/19 1011 11/25/19 1012 11/25/19 1013   BP: (!) 147/98 132/88 (!) 144/94 (!) 141/94   Pulse: 80 80 80 80   Weight: 107.5 kg (237 lb)          Physical Exam  Vitals signs and nursing note reviewed.   Constitutional:       Appearance: Normal appearance.   HENT:      Head: Normocephalic and atraumatic.      Mouth/Throat:      Mouth: Mucous membranes are moist.   Eyes:      Pupils: Pupils are equal, round, and reactive to light.   Cardiovascular:      Rate and Rhythm: Normal rate and regular rhythm.   Pulmonary:      Effort: Pulmonary effort is normal.       Breath sounds: Normal breath sounds.   Musculoskeletal:         General: No edema.   Neurological:      General: No focal deficit present.      Mental Status: She is alert. Mental status is at baseline.   Psychiatric:         Mood and Affect: Mood normal.         Thought Content: Thought content normal.         Judgment: Judgment normal.         Assessment and Plan:  Ruma was seen today for follow up.    Diagnoses and all orders for this visit:    Essential hypertension.  Blood pressure is elevated today.  However, patient developed a cough on lisinopril.  Recommend switching to losartan at 50 mg daily in addition to nifedipine.  Will titrate losartan if patient is able to tolerated.  Patient expressed understanding and agreement with the plan.  -     losartan (COZAAR) 50 MG tablet; Take 1 tablet (50 mg) by mouth daily    Total time spent 25 minutes.  More than 50% of the time spent with Ms. Vega on counseling / coordinating her care    Shannon Holder MD

## 2019-11-25 NOTE — LETTER
11/25/2019       RE: Ruma Vega  [address] [facility] [address]     Dear Colleague,    Thank you for referring your patient, Ruma Vega, to the [facility]  at [facility]. Please see a copy of my visit note below.    HPI  Patient is here for follow up on hypertension. She reports that she is not planning additional pregnancies at this time. She reports dry cough, wondering if it is related to lisinopril.     Review of Systems     Constitutional:  Negative for fever, chills and fatigue.   Respiratory:   Positive for cough. Negative for shortness of breath, wheezing and respiratory pain.    Cardiovascular:  Negative for chest pain and edema.   Skin:  Negative for itching and rash.   Neurological:  Negative for weakness.   Endo/Heme:  Negative for anemia, swollen glands and bruises/bleeds easily.   Psychiatric/Behavioral:  Negative for depression, decreased concentration, mood swings and panic attacks.    Endocrine:  Negative for altered temperature regulation, polyphagia, polydipsia, unwanted hair growth and change in facial hair.    Current Outpatient Medications   Medication     Cholecalciferol (VITAMIN D-3 PO)     fluticasone (FLONASE) 50 MCG/ACT nasal spray     FOLIC ACID PO     Hypertonic Nasal Wash (SINUS RINSE BOTTLE KIT NA)     ketotifen (ZADITOR/REFRESH ANTI-ITCH) 0.025 % SOLN     mometasone (ELOCON) 0.1 % external cream     NIFEdipine ER OSMOTIC (PROCARDIA XL) 60 MG 24 hr tablet     PRENATAL VITAMINS PO     psyllium (METAMUCIL) 58.6 % POWD     rizatriptan (MAXALT-MLT) 5 MG ODT     No current facility-administered medications for this visit.      Vitals:    11/25/19 1008 11/25/19 1011 11/25/19 1012 11/25/19 1013   BP: (!) 147/98 132/88 (!) 144/94 (!) 141/94   Pulse: 80 80 80 80   Weight: 107.5 kg (237 lb)          Physical Exam  Vitals signs and nursing note reviewed.   Constitutional:       Appearance: Normal appearance.    HENT:      Head: Normocephalic and atraumatic.      Mouth/Throat:      Mouth: Mucous membranes are moist.   Eyes:      Pupils: Pupils are equal, round, and reactive to light.   Cardiovascular:      Rate and Rhythm: Normal rate and regular rhythm.   Pulmonary:      Effort: Pulmonary effort is normal.      Breath sounds: Normal breath sounds.   Musculoskeletal:         General: No edema.   Neurological:      General: No focal deficit present.      Mental Status: She is alert. Mental status is at baseline.   Psychiatric:         Mood and Affect: Mood normal.         Thought Content: Thought content normal.         Judgment: Judgment normal.     Assessment and Plan:  Ruma was seen today for follow up.    Diagnoses and all orders for this visit:    Essential hypertension.  Blood pressure is elevated today.  However, patient developed a cough on lisinopril.  Recommend switching to losartan at 50 mg daily in addition to nifedipine.  Will titrate losartan if patient is able to tolerated.  Patient expressed understanding and agreement with the plan.  -     losartan (COZAAR) 50 MG tablet; Take 1 tablet (50 mg) by mouth daily    Total time spent 25 minutes.  More than 50% of the time spent with Ms. Vega on counseling / coordinating her care    Shannon Holder MD

## 2019-11-26 ASSESSMENT — ANXIETY QUESTIONNAIRES: GAD7 TOTAL SCORE: 0

## 2019-11-30 ASSESSMENT — ENCOUNTER SYMPTOMS
NERVOUS/ANXIOUS: 0
FEVER: 0
DECREASED CONCENTRATION: 0
ALTERED TEMPERATURE REGULATION: 0
BRUISES/BLEEDS EASILY: 0
DEPRESSION: 0
COUGH: 1
WEAKNESS: 0
SWOLLEN GLANDS: 0
INSOMNIA: 0
RESPIRATORY PAIN: 0
FATIGUE: 0
SHORTNESS OF BREATH: 0
CHILLS: 0
POLYPHAGIA: 0
POLYDIPSIA: 0
PANIC: 0
WHEEZING: 0

## 2019-12-17 ENCOUNTER — OFFICE VISIT (OUTPATIENT)
Dept: INTERNAL MEDICINE | Facility: CLINIC | Age: 39
End: 2019-12-17
Attending: INTERNAL MEDICINE
Payer: COMMERCIAL

## 2019-12-17 VITALS
HEART RATE: 80 BPM | DIASTOLIC BLOOD PRESSURE: 84 MMHG | WEIGHT: 239 LBS | BODY MASS INDEX: 35.29 KG/M2 | SYSTOLIC BLOOD PRESSURE: 138 MMHG

## 2019-12-17 DIAGNOSIS — I10 BENIGN ESSENTIAL HYPERTENSION: ICD-10-CM

## 2019-12-17 PROCEDURE — G0463 HOSPITAL OUTPT CLINIC VISIT: HCPCS | Mod: ZF

## 2019-12-17 RX ORDER — NIFEDIPINE 30 MG
30 TABLET, EXTENDED RELEASE ORAL DAILY
Qty: 30 TABLET | Refills: 3 | Status: SHIPPED | OUTPATIENT
Start: 2019-12-17 | End: 2020-02-05

## 2019-12-17 RX ORDER — LOSARTAN POTASSIUM AND HYDROCHLOROTHIAZIDE 25; 100 MG/1; MG/1
1 TABLET ORAL DAILY
Qty: 30 TABLET | Refills: 3 | Status: SHIPPED | OUTPATIENT
Start: 2019-12-17 | End: 2020-02-05

## 2019-12-17 ASSESSMENT — ANXIETY QUESTIONNAIRES
3. WORRYING TOO MUCH ABOUT DIFFERENT THINGS: NOT AT ALL
1. FEELING NERVOUS, ANXIOUS, OR ON EDGE: NOT AT ALL
6. BECOMING EASILY ANNOYED OR IRRITABLE: NOT AT ALL
2. NOT BEING ABLE TO STOP OR CONTROL WORRYING: NOT AT ALL
GAD7 TOTAL SCORE: 0
5. BEING SO RESTLESS THAT IT IS HARD TO SIT STILL: NOT AT ALL
7. FEELING AFRAID AS IF SOMETHING AWFUL MIGHT HAPPEN: NOT AT ALL

## 2019-12-17 ASSESSMENT — PATIENT HEALTH QUESTIONNAIRE - PHQ9
5. POOR APPETITE OR OVEREATING: NOT AT ALL
SUM OF ALL RESPONSES TO PHQ QUESTIONS 1-9: 0

## 2019-12-17 ASSESSMENT — PAIN SCALES - GENERAL: PAINLEVEL: NO PAIN (0)

## 2019-12-17 NOTE — LETTER
12/17/2019       RE: Ruma Vega  [address] [facility] [address]     Dear Colleague,    Thank you for referring your patient, Ruma Vega, to the [facility]  at [facility]. Please see a copy of my visit note below.    HPI  Patient is here for follow-up on hypertension.  She reports that she has been feeling well.  She denies any significant medication related side effects.  She is wondering if further titration of therapy would be indicated.    Review of Systems     Constitutional:  Negative for fever, chills and fatigue.   Eyes:  Negative for decreased vision.   Respiratory:   Negative for cough and dyspnea on exertion.    Cardiovascular:  Negative for chest pain, dyspnea on exertion and edema.   Skin:  Negative for itching and rash.   Psychiatric/Behavioral:  Negative for depression, decreased concentration, mood swings and panic attacks.    Endocrine:  Negative for altered temperature regulation, polyphagia, polydipsia, unwanted hair growth and change in facial hair.    Current Outpatient Medications   Medication     Cholecalciferol (VITAMIN D-3 PO)     fluticasone (FLONASE) 50 MCG/ACT nasal spray     FOLIC ACID PO     Hypertonic Nasal Wash (SINUS RINSE BOTTLE KIT NA)     ketotifen (ZADITOR/REFRESH ANTI-ITCH) 0.025 % SOLN     losartan (COZAAR) 50 MG tablet     mometasone (ELOCON) 0.1 % external cream     NIFEdipine ER OSMOTIC (PROCARDIA XL) 60 MG 24 hr tablet     PRENATAL VITAMINS PO     psyllium (METAMUCIL) 58.6 % POWD     rizatriptan (MAXALT-MLT) 5 MG ODT     No current facility-administered medications for this visit.      Vitals:    12/17/19 1458 12/17/19 1505 12/17/19 1506 12/17/19 1507   BP: (!) 144/84 139/83 137/84 138/84   Pulse: 80 80 80 80   Weight: 108.4 kg (239 lb)  108.4 kg (239 lb)          Physical Exam  Vitals signs and nursing note reviewed.   Constitutional:       Appearance: Normal appearance.   HENT:       Head: Normocephalic and atraumatic.   Neck:      Musculoskeletal: Normal range of motion.   Cardiovascular:      Rate and Rhythm: Normal rate and regular rhythm.      Pulses: Normal pulses.   Pulmonary:      Effort: Pulmonary effort is normal.   Musculoskeletal:         General: No edema.   Neurological:      General: No focal deficit present.      Mental Status: She is alert and oriented to person, place, and time.   Psychiatric:         Mood and Affect: Mood normal.         Behavior: Behavior normal.         Thought Content: Thought content normal.         Judgment: Judgment normal.         Assessment and plan:    Ruma was seen today for follow up.    Diagnoses and all orders for this visit:    Benign essential hypertension.  Blood pressure is mildly elevated.  Patient is able to tolerate losartan without any issues.  Recommend further reducing the dose of nifedipine and increasing the dose of losartan, as well as adding hydrochlorothiazide.  Prescription was sent to patient's pharmacy.  Recommend follow-up in 2 to 3 weeks for recheck of the blood pressure as well as electrolytes.  Patient expressed understanding and agreement with the plan.  -     losartan-hydrochlorothiazide (HYZAAR) 100-25 MG tablet; Take 1 tablet by mouth daily  -     NIFEdipine ER OSMOTIC (ADALAT CC) 30 MG 24 hr tablet; Take 1 tablet (30 mg) by mouth daily  -     Cancel: Basic Metabolic Panel  -     Basic Metabolic Panel; Future    Total time spent 25 minutes.  More than 50% of the time spent with Ms. Vega on counseling / coordinating her care    Shannon Holder MD

## 2019-12-17 NOTE — PROGRESS NOTES
HPI  Patient is here for follow-up on hypertension.  She reports that she has been feeling well.  She denies any significant medication related side effects.  She is wondering if further titration of therapy would be indicated.    Review of Systems     Constitutional:  Negative for fever, chills and fatigue.   Eyes:  Negative for decreased vision.   Respiratory:   Negative for cough and dyspnea on exertion.    Cardiovascular:  Negative for chest pain, dyspnea on exertion and edema.   Skin:  Negative for itching and rash.   Psychiatric/Behavioral:  Negative for depression, decreased concentration, mood swings and panic attacks.    Endocrine:  Negative for altered temperature regulation, polyphagia, polydipsia, unwanted hair growth and change in facial hair.    Current Outpatient Medications   Medication     Cholecalciferol (VITAMIN D-3 PO)     fluticasone (FLONASE) 50 MCG/ACT nasal spray     FOLIC ACID PO     Hypertonic Nasal Wash (SINUS RINSE BOTTLE KIT NA)     ketotifen (ZADITOR/REFRESH ANTI-ITCH) 0.025 % SOLN     losartan (COZAAR) 50 MG tablet     mometasone (ELOCON) 0.1 % external cream     NIFEdipine ER OSMOTIC (PROCARDIA XL) 60 MG 24 hr tablet     PRENATAL VITAMINS PO     psyllium (METAMUCIL) 58.6 % POWD     rizatriptan (MAXALT-MLT) 5 MG ODT     No current facility-administered medications for this visit.      Vitals:    12/17/19 1458 12/17/19 1505 12/17/19 1506 12/17/19 1507   BP: (!) 144/84 139/83 137/84 138/84   Pulse: 80 80 80 80   Weight: 108.4 kg (239 lb)  108.4 kg (239 lb)          Physical Exam  Vitals signs and nursing note reviewed.   Constitutional:       Appearance: Normal appearance.   HENT:      Head: Normocephalic and atraumatic.   Neck:      Musculoskeletal: Normal range of motion.   Cardiovascular:      Rate and Rhythm: Normal rate and regular rhythm.      Pulses: Normal pulses.   Pulmonary:      Effort: Pulmonary effort is normal.   Musculoskeletal:         General: No edema.   Neurological:       General: No focal deficit present.      Mental Status: She is alert and oriented to person, place, and time.   Psychiatric:         Mood and Affect: Mood normal.         Behavior: Behavior normal.         Thought Content: Thought content normal.         Judgment: Judgment normal.         Assessment and plan:    Ruma was seen today for follow up.    Diagnoses and all orders for this visit:    Benign essential hypertension.  Blood pressure is mildly elevated.  Patient is able to tolerate losartan without any issues.  Recommend further reducing the dose of nifedipine and increasing the dose of losartan, as well as adding hydrochlorothiazide.  Prescription was sent to patient's pharmacy.  Recommend follow-up in 2 to 3 weeks for recheck of the blood pressure as well as electrolytes.  Patient expressed understanding and agreement with the plan.  -     losartan-hydrochlorothiazide (HYZAAR) 100-25 MG tablet; Take 1 tablet by mouth daily  -     NIFEdipine ER OSMOTIC (ADALAT CC) 30 MG 24 hr tablet; Take 1 tablet (30 mg) by mouth daily  -     Cancel: Basic Metabolic Panel  -     Basic Metabolic Panel; Future    Total time spent 25 minutes.  More than 50% of the time spent with Ms. Vega on counseling / coordinating her care    Shannon Holder MD

## 2019-12-18 ASSESSMENT — ANXIETY QUESTIONNAIRES: GAD7 TOTAL SCORE: 0

## 2019-12-18 NOTE — PROGRESS NOTES
Gynecology Visit Note  12/19/19    Reason for visit: Mirena IUD check    S: Patient is a 38 yo  who present for IUD check s/p Mirena IUD insertion on 11/4/19.  Since IUD placement she has been doing well.  Had no cramping /discomfort.  Has had some spotting that came around time of expected menses that lasted about 14 days but no actual flow.  Denies any concerning discharge aside from the spotting.  No fevers/chills.  No dyspareunia.  No issues with  with strings.  Feels like overall things are good, wondering about if she won't get cycles, would be happy if she didn't this time.    O:  LMP 11/25/2019      General: NAD, A&Ox3  Resp: Non-labored breathing  Pelvic: EGUBS wnl.  Vagina well rugated, no discharge present.  IUD strings visualized at external os and measuring 3 cm, wrapping around 11 o'clock position of cervix.  No plastic portion of IUD seen.    A/P: 38 yo  presents for Mirena IUD check  1) IUD check:  Appears correctly positioned on exam today.  Discussed expectations for unpredictable bleeding for 3-6 months post-insertion.  If has any continued concerns after this time she will call us.  Otherwise follow-up for annual preventive exams or earlier for any other concerns.    Agnes Wheeler MD

## 2019-12-19 ENCOUNTER — OFFICE VISIT (OUTPATIENT)
Dept: OBGYN | Facility: CLINIC | Age: 39
End: 2019-12-19
Attending: OBSTETRICS & GYNECOLOGY
Payer: COMMERCIAL

## 2019-12-19 VITALS
HEIGHT: 69 IN | WEIGHT: 239 LBS | SYSTOLIC BLOOD PRESSURE: 144 MMHG | HEART RATE: 81 BPM | BODY MASS INDEX: 35.4 KG/M2 | DIASTOLIC BLOOD PRESSURE: 86 MMHG

## 2019-12-19 DIAGNOSIS — I10 BENIGN ESSENTIAL HYPERTENSION: ICD-10-CM

## 2019-12-19 DIAGNOSIS — Z30.431 IUD CHECK UP: Primary | ICD-10-CM

## 2019-12-19 LAB
ANION GAP SERPL CALCULATED.3IONS-SCNC: 5 MMOL/L (ref 3–14)
BUN SERPL-MCNC: 12 MG/DL (ref 7–30)
CALCIUM SERPL-MCNC: 8.6 MG/DL (ref 8.5–10.1)
CHLORIDE SERPL-SCNC: 106 MMOL/L (ref 94–109)
CO2 SERPL-SCNC: 28 MMOL/L (ref 20–32)
CREAT SERPL-MCNC: 0.9 MG/DL (ref 0.52–1.04)
GFR SERPL CREATININE-BSD FRML MDRD: 79 ML/MIN/{1.73_M2}
GLUCOSE SERPL-MCNC: 87 MG/DL (ref 70–99)
POTASSIUM SERPL-SCNC: 3.9 MMOL/L (ref 3.4–5.3)
SODIUM SERPL-SCNC: 139 MMOL/L (ref 133–144)

## 2019-12-19 PROCEDURE — 36415 COLL VENOUS BLD VENIPUNCTURE: CPT | Performed by: INTERNAL MEDICINE

## 2019-12-19 PROCEDURE — G0463 HOSPITAL OUTPT CLINIC VISIT: HCPCS

## 2019-12-19 PROCEDURE — 80048 BASIC METABOLIC PNL TOTAL CA: CPT | Performed by: INTERNAL MEDICINE

## 2019-12-19 ASSESSMENT — MIFFLIN-ST. JEOR: SCORE: 1818.48

## 2019-12-19 NOTE — LETTER
12/19/2019       RE: Ruma Vega  [address] [facility] [address]     Dear Colleague,    Thank you for referring your patient, Ruma Vega, to [facility] Please see a copy of my visit note below.    Gynecology Visit Note  12/19/19    Reason for visit: Mirena IUD check    S: Patient is a 38 yo  who present for IUD check s/p Mirena IUD insertion on 11/4/19.  Since IUD placement she has been doing well.  Had no cramping /discomfort.  Has had some spotting that came around time of expected menses that lasted about 14 days but no actual flow.  Denies any concerning discharge aside from the spotting.  No fevers/chills.  No dyspareunia.  No issues with  with strings.  Feels like overall things are good, wondering about if she won't get cycles, would be happy if she didn't this time.    O:  LMP 11/25/2019      General: NAD, A&Ox3  Resp: Non-labored breathing  Pelvic: EGUBS wnl.  Vagina well rugated, no discharge present.  IUD strings visualized at external os and measuring 3 cm, wrapping around 11 o'clock position of cervix.  No plastic portion of IUD seen.    A/P: 38 yo  presents for Mirena IUD check  1) IUD check:  Appears correctly positioned on exam today.  Discussed expectations for unpredictable bleeding for 3-6 months post-insertion.  If has any continued concerns after this time she will call us.  Otherwise follow-up for annual preventive exams or earlier for any other concerns.    Agnes Wheeler MD

## 2019-12-25 ASSESSMENT — ENCOUNTER SYMPTOMS
COUGH: 0
DECREASED CONCENTRATION: 0
DEPRESSION: 0
CHILLS: 0
POLYPHAGIA: 0
NERVOUS/ANXIOUS: 0
POLYDIPSIA: 0
DYSPNEA ON EXERTION: 0
FATIGUE: 0
PANIC: 0
ALTERED TEMPERATURE REGULATION: 0
INSOMNIA: 0
FEVER: 0

## 2020-01-08 ENCOUNTER — OFFICE VISIT (OUTPATIENT)
Dept: INTERNAL MEDICINE | Facility: CLINIC | Age: 40
End: 2020-01-08
Attending: INTERNAL MEDICINE
Payer: COMMERCIAL

## 2020-01-08 VITALS
HEART RATE: 87 BPM | DIASTOLIC BLOOD PRESSURE: 78 MMHG | BODY MASS INDEX: 34.56 KG/M2 | WEIGHT: 234 LBS | SYSTOLIC BLOOD PRESSURE: 115 MMHG

## 2020-01-08 DIAGNOSIS — I10 ESSENTIAL HYPERTENSION: Primary | ICD-10-CM

## 2020-01-08 LAB
ANION GAP SERPL CALCULATED.3IONS-SCNC: 5 MMOL/L (ref 3–14)
BUN SERPL-MCNC: 11 MG/DL (ref 7–30)
CALCIUM SERPL-MCNC: 8.9 MG/DL (ref 8.5–10.1)
CHLORIDE SERPL-SCNC: 104 MMOL/L (ref 94–109)
CO2 SERPL-SCNC: 29 MMOL/L (ref 20–32)
CREAT SERPL-MCNC: 0.98 MG/DL (ref 0.52–1.04)
GFR SERPL CREATININE-BSD FRML MDRD: 72 ML/MIN/{1.73_M2}
GLUCOSE SERPL-MCNC: 93 MG/DL (ref 70–99)
POTASSIUM SERPL-SCNC: 4 MMOL/L (ref 3.4–5.3)
SODIUM SERPL-SCNC: 138 MMOL/L (ref 133–144)

## 2020-01-08 PROCEDURE — 36415 COLL VENOUS BLD VENIPUNCTURE: CPT | Performed by: INTERNAL MEDICINE

## 2020-01-08 PROCEDURE — G0463 HOSPITAL OUTPT CLINIC VISIT: HCPCS

## 2020-01-08 PROCEDURE — 80048 BASIC METABOLIC PNL TOTAL CA: CPT | Performed by: INTERNAL MEDICINE

## 2020-01-08 ASSESSMENT — PAIN SCALES - GENERAL: PAINLEVEL: NO PAIN (0)

## 2020-01-08 NOTE — LETTER
1/8/2020       RE: Ruma Vega  [address] [facility] [address]     Dear Colleague,    Thank you for referring your patient, Ruma Vega, to the [facility]  at [facility]. Please see a copy of my visit note below.    HPI  Patient is here for follow-up on hypertension.  She reports that she has been feeling well.  She denies side effects of medical therapy.  She continues to take her medications as directed.  She is wondering if further adjustment of therapy are needed.    Review of Systems     Constitutional:  Negative for fever, chills and fatigue.   HENT:  Negative for ear pain.    Respiratory:   Negative for cough and dyspnea on exertion.    Cardiovascular:  Negative for chest pain, dyspnea on exertion and edema.   Skin:  Negative for itching and rash.   Endo/Heme:  Negative for anemia, swollen glands and bruises/bleeds easily.   Psychiatric/Behavioral:  Negative for depression, decreased concentration, mood swings and panic attacks.    Breast:  Negative for breast discharge, breast mass, breast pain and nipple retraction.   Endocrine:  Negative for altered temperature regulation, polyphagia, polydipsia, unwanted hair growth and change in facial hair.    Current Outpatient Medications   Medication     Cholecalciferol (VITAMIN D-3 PO)     fluticasone (FLONASE) 50 MCG/ACT nasal spray     FOLIC ACID PO     Hypertonic Nasal Wash (SINUS RINSE BOTTLE KIT NA)     ketotifen (ZADITOR/REFRESH ANTI-ITCH) 0.025 % SOLN     losartan-hydrochlorothiazide (HYZAAR) 100-25 MG tablet     mometasone (ELOCON) 0.1 % external cream     NIFEdipine ER OSMOTIC (ADALAT CC) 30 MG 24 hr tablet     PRENATAL VITAMINS PO     psyllium (METAMUCIL) 58.6 % POWD     rizatriptan (MAXALT-MLT) 5 MG ODT     No current facility-administered medications for this visit.      Vitals:    01/08/20 1511 01/08/20 1526 01/08/20 1527 01/08/20 1528   BP: 119/80 115/78 112/77 115/78    Pulse: 87 87 87 87   Weight: 106.1 kg (234 lb)            Physical Exam  Vitals signs and nursing note reviewed.   Constitutional:       Appearance: Normal appearance.   HENT:      Head: Normocephalic.      Mouth/Throat:      Mouth: Mucous membranes are moist.      Pharynx: Oropharynx is clear.   Eyes:      Pupils: Pupils are equal, round, and reactive to light.   Cardiovascular:      Rate and Rhythm: Normal rate.   Pulmonary:      Effort: Pulmonary effort is normal.   Musculoskeletal:         General: No edema.   Skin:     General: Skin is warm and dry.   Neurological:      General: No focal deficit present.      Mental Status: She is alert and oriented to person, place, and time. Mental status is at baseline.   Psychiatric:         Mood and Affect: Mood normal.         Behavior: Behavior normal.         Thought Content: Thought content normal.         Judgment: Judgment normal.       Assessment and Plan:  Ruma was seen today for follow up.    Diagnoses and all orders for this visit:    Essential hypertension.  Blood pressures within acceptable range today.  Recommend stopping nifedipine.  Will continue with losartan hydrochlorothiazide..  Given the patient is a kidney donor, tighter blood pressure control may be optimal.  Therefore if blood pressure is above 130/80 may consider adding a third agent.  Patient expressed understanding and agreement with the plan.  -     Basic Metabolic Panel    Total time spent 15 minutes.  More than 50% of the time spent with Ms. Vega on counseling / coordinating her care    Shannon Holder MD

## 2020-01-08 NOTE — PROGRESS NOTES
HPI  Patient is here for follow-up on hypertension.  She reports that she has been feeling well.  She denies side effects of medical therapy.  She continues to take her medications as directed.  She is wondering if further adjustment of therapy are needed.    Review of Systems     Constitutional:  Negative for fever, chills and fatigue.   HENT:  Negative for ear pain.    Respiratory:   Negative for cough and dyspnea on exertion.    Cardiovascular:  Negative for chest pain, dyspnea on exertion and edema.   Skin:  Negative for itching and rash.   Endo/Heme:  Negative for anemia, swollen glands and bruises/bleeds easily.   Psychiatric/Behavioral:  Negative for depression, decreased concentration, mood swings and panic attacks.    Breast:  Negative for breast discharge, breast mass, breast pain and nipple retraction.   Endocrine:  Negative for altered temperature regulation, polyphagia, polydipsia, unwanted hair growth and change in facial hair.    Current Outpatient Medications   Medication     Cholecalciferol (VITAMIN D-3 PO)     fluticasone (FLONASE) 50 MCG/ACT nasal spray     FOLIC ACID PO     Hypertonic Nasal Wash (SINUS RINSE BOTTLE KIT NA)     ketotifen (ZADITOR/REFRESH ANTI-ITCH) 0.025 % SOLN     losartan-hydrochlorothiazide (HYZAAR) 100-25 MG tablet     mometasone (ELOCON) 0.1 % external cream     NIFEdipine ER OSMOTIC (ADALAT CC) 30 MG 24 hr tablet     PRENATAL VITAMINS PO     psyllium (METAMUCIL) 58.6 % POWD     rizatriptan (MAXALT-MLT) 5 MG ODT     No current facility-administered medications for this visit.      Vitals:    01/08/20 1511 01/08/20 1526 01/08/20 1527 01/08/20 1528   BP: 119/80 115/78 112/77 115/78   Pulse: 87 87 87 87   Weight: 106.1 kg (234 lb)            Physical Exam  Vitals signs and nursing note reviewed.   Constitutional:       Appearance: Normal appearance.   HENT:      Head: Normocephalic.      Mouth/Throat:      Mouth: Mucous membranes are moist.      Pharynx: Oropharynx is clear.    Eyes:      Pupils: Pupils are equal, round, and reactive to light.   Cardiovascular:      Rate and Rhythm: Normal rate.   Pulmonary:      Effort: Pulmonary effort is normal.   Musculoskeletal:         General: No edema.   Skin:     General: Skin is warm and dry.   Neurological:      General: No focal deficit present.      Mental Status: She is alert and oriented to person, place, and time. Mental status is at baseline.   Psychiatric:         Mood and Affect: Mood normal.         Behavior: Behavior normal.         Thought Content: Thought content normal.         Judgment: Judgment normal.       Assessment and Plan:  Ruma was seen today for follow up.    Diagnoses and all orders for this visit:    Essential hypertension.  Blood pressures within acceptable range today.  Recommend stopping nifedipine.  Will continue with losartan hydrochlorothiazide..  Given the patient is a kidney donor, tighter blood pressure control may be optimal.  Therefore if blood pressure is above 130/80 may consider adding a third agent.  Patient expressed understanding and agreement with the plan.  -     Basic Metabolic Panel    Total time spent 15 minutes.  More than 50% of the time spent with Ms. Vega on counseling / coordinating her care    Shannon Holder MD

## 2020-01-12 ASSESSMENT — ENCOUNTER SYMPTOMS
BRUISES/BLEEDS EASILY: 0
PANIC: 0
BREAST PAIN: 0
NERVOUS/ANXIOUS: 0
DYSPNEA ON EXERTION: 0
DECREASED CONCENTRATION: 0
POLYDIPSIA: 0
POLYPHAGIA: 0
DEPRESSION: 0
CHILLS: 0
INSOMNIA: 0
SWOLLEN GLANDS: 0
FEVER: 0
COUGH: 0
FATIGUE: 0
ALTERED TEMPERATURE REGULATION: 0
BREAST MASS: 0

## 2020-02-05 ENCOUNTER — OFFICE VISIT (OUTPATIENT)
Dept: INTERNAL MEDICINE | Facility: CLINIC | Age: 40
End: 2020-02-05
Attending: INTERNAL MEDICINE
Payer: COMMERCIAL

## 2020-02-05 VITALS — WEIGHT: 239 LBS | DIASTOLIC BLOOD PRESSURE: 85 MMHG | BODY MASS INDEX: 35.29 KG/M2 | SYSTOLIC BLOOD PRESSURE: 121 MMHG

## 2020-02-05 DIAGNOSIS — I10 BENIGN ESSENTIAL HYPERTENSION: ICD-10-CM

## 2020-02-05 PROCEDURE — G0463 HOSPITAL OUTPT CLINIC VISIT: HCPCS | Mod: ZF

## 2020-02-05 RX ORDER — LOSARTAN POTASSIUM AND HYDROCHLOROTHIAZIDE 25; 100 MG/1; MG/1
1 TABLET ORAL DAILY
Qty: 90 TABLET | Refills: 3 | Status: SHIPPED | OUTPATIENT
Start: 2020-02-05 | End: 2020-05-07

## 2020-02-05 ASSESSMENT — PAIN SCALES - GENERAL: PAINLEVEL: NO PAIN (0)

## 2020-02-05 NOTE — PROGRESS NOTES
HPI  Patient is here for follow up on hypertension. She reports that she has been feeling well. She denies medication-related side effects.     Review of Systems     Constitutional:  Negative for fever, chills and fatigue.   Respiratory:   Negative for cough and dyspnea on exertion.    Cardiovascular:  Negative for chest pain, dyspnea on exertion and edema.   Gastrointestinal:  Negative for nausea, vomiting, abdominal pain, diarrhea and constipation.   Musculoskeletal:  Negative for back pain and arthralgias.   Skin:  Negative for itching and rash.   Endo/Heme:  Negative for anemia, swollen glands and bruises/bleeds easily.   Psychiatric/Behavioral:  Negative for depression, decreased concentration, mood swings and panic attacks.    Endocrine:  Negative for altered temperature regulation, polyphagia, polydipsia, unwanted hair growth and change in facial hair.    Current Outpatient Medications   Medication     Cholecalciferol (VITAMIN D-3 PO)     fluticasone (FLONASE) 50 MCG/ACT nasal spray     FOLIC ACID PO     Hypertonic Nasal Wash (SINUS RINSE BOTTLE KIT NA)     ketotifen (ZADITOR/REFRESH ANTI-ITCH) 0.025 % SOLN     losartan-hydrochlorothiazide (HYZAAR) 100-25 MG tablet     mometasone (ELOCON) 0.1 % external cream     PRENATAL VITAMINS PO     psyllium (METAMUCIL) 58.6 % POWD     rizatriptan (MAXALT-MLT) 5 MG ODT     No current facility-administered medications for this visit.      Vitals:    02/05/20 1652 02/05/20 1657 02/05/20 1658 02/05/20 1659   BP: 122/87 119/84 123/85 121/85   Weight: 108.4 kg (239 lb)        Physical Exam  Vitals signs and nursing note reviewed.   Constitutional:       Appearance: Normal appearance.   HENT:      Head: Normocephalic and atraumatic.      Mouth/Throat:      Mouth: Mucous membranes are moist.   Cardiovascular:      Rate and Rhythm: Normal rate.   Pulmonary:      Effort: Pulmonary effort is normal.   Abdominal:      General: Abdomen is flat.   Musculoskeletal:         General: No  edema.   Neurological:      General: No focal deficit present.      Mental Status: She is alert and oriented to person, place, and time.   Psychiatric:         Mood and Affect: Mood normal.         Behavior: Behavior normal.         Thought Content: Thought content normal.         Judgment: Judgment normal.       Assessment and Plan:  Ruma was seen today for follow up.    Diagnoses and all orders for this visit:    Benign essential hypertension. Blood pressure is within acceptable range. Patient is tolerating medical therapy without any issues. Recommend to continue with current regimen.   -     losartan-hydrochlorothiazide (HYZAAR) 100-25 MG tablet; Take 1 tablet by mouth daily    Total time spent 15 minutes.  More than 50% of the time spent with Ms. Vega on counseling / coordinating her care    Shannon Holder MD

## 2020-02-05 NOTE — LETTER
2/5/2020       RE: Ruma Vega  [address] [facility] [address]     Dear Colleague,    Thank you for referring your patient, Ruma Vega, to the [facility]  at [facility]. Please see a copy of my visit note below.    HPI  Patient is here for follow up on hypertension. She reports that she has been feeling well. She denies medication-related side effects.     Review of Systems     Constitutional:  Negative for fever, chills and fatigue.   Respiratory:   Negative for cough and dyspnea on exertion.    Cardiovascular:  Negative for chest pain, dyspnea on exertion and edema.   Gastrointestinal:  Negative for nausea, vomiting, abdominal pain, diarrhea and constipation.   Musculoskeletal:  Negative for back pain and arthralgias.   Skin:  Negative for itching and rash.   Endo/Heme:  Negative for anemia, swollen glands and bruises/bleeds easily.   Psychiatric/Behavioral:  Negative for depression, decreased concentration, mood swings and panic attacks.    Endocrine:  Negative for altered temperature regulation, polyphagia, polydipsia, unwanted hair growth and change in facial hair.    Current Outpatient Medications   Medication     Cholecalciferol (VITAMIN D-3 PO)     fluticasone (FLONASE) 50 MCG/ACT nasal spray     FOLIC ACID PO     Hypertonic Nasal Wash (SINUS RINSE BOTTLE KIT NA)     ketotifen (ZADITOR/REFRESH ANTI-ITCH) 0.025 % SOLN     losartan-hydrochlorothiazide (HYZAAR) 100-25 MG tablet     mometasone (ELOCON) 0.1 % external cream     PRENATAL VITAMINS PO     psyllium (METAMUCIL) 58.6 % POWD     rizatriptan (MAXALT-MLT) 5 MG ODT     No current facility-administered medications for this visit.      Vitals:    02/05/20 1652 02/05/20 1657 02/05/20 1658 02/05/20 1659   BP: 122/87 119/84 123/85 121/85   Weight: 108.4 kg (239 lb)        Physical Exam  Vitals signs and nursing note reviewed.   Constitutional:       Appearance: Normal  appearance.   HENT:      Head: Normocephalic and atraumatic.      Mouth/Throat:      Mouth: Mucous membranes are moist.   Cardiovascular:      Rate and Rhythm: Normal rate.   Pulmonary:      Effort: Pulmonary effort is normal.   Abdominal:      General: Abdomen is flat.   Musculoskeletal:         General: No edema.   Neurological:      General: No focal deficit present.      Mental Status: She is alert and oriented to person, place, and time.   Psychiatric:         Mood and Affect: Mood normal.         Behavior: Behavior normal.         Thought Content: Thought content normal.         Judgment: Judgment normal.       Assessment and Plan:  Ruma was seen today for follow up.    Diagnoses and all orders for this visit:    Benign essential hypertension. Blood pressure is within acceptable range. Patient is tolerating medical therapy without any issues. Recommend to continue with current regimen.   -     losartan-hydrochlorothiazide (HYZAAR) 100-25 MG tablet; Take 1 tablet by mouth daily    Total time spent 15 minutes.  More than 50% of the time spent with Ms. Vega on counseling / coordinating her care    Shannon Holder MD

## 2020-02-07 ENCOUNTER — OFFICE VISIT (OUTPATIENT)
Dept: URGENT CARE | Facility: URGENT CARE | Age: 40
End: 2020-02-07
Payer: COMMERCIAL

## 2020-02-07 VITALS
DIASTOLIC BLOOD PRESSURE: 80 MMHG | TEMPERATURE: 98.9 F | OXYGEN SATURATION: 99 % | BODY MASS INDEX: 34.85 KG/M2 | RESPIRATION RATE: 15 BRPM | HEART RATE: 80 BPM | SYSTOLIC BLOOD PRESSURE: 128 MMHG | WEIGHT: 236 LBS

## 2020-02-07 DIAGNOSIS — H66.93 ACUTE BILATERAL OTITIS MEDIA: Primary | ICD-10-CM

## 2020-02-07 PROCEDURE — 99213 OFFICE O/P EST LOW 20 MIN: CPT | Performed by: FAMILY MEDICINE

## 2020-02-07 RX ORDER — DOXYCYCLINE 100 MG/1
100 CAPSULE ORAL 2 TIMES DAILY
Qty: 20 CAPSULE | Refills: 0 | Status: SHIPPED | OUTPATIENT
Start: 2020-02-07 | End: 2020-02-17

## 2020-02-07 NOTE — PROGRESS NOTES
SUBJECTIVE:   Ruma Vega is a 39 year old female presenting with a chief complaint of nasal congestion for the past three weeks, congestion in the bilateral ears since last night, decreased left ear hearing since last night, bilateral ear pain since last night. .  Onset of symptoms was three weeks ago.  Course of illness is worsening. .    Severity the moderate to severe pain and worsening.   Current and Associated symptoms: stuffy nose, chest congestion, Patient recently had yellowish-green nasal discharge.  .    Treatment measures tried include Saline nasal rinses, Flonase nasal spray.    .    Past Medical History:   Diagnosis Date     Factor V Leiden (H)      Gestational HTN      Hypertension      Rhinitis, allergic      Current Outpatient Medications   Medication Sig Dispense Refill     Cholecalciferol (VITAMIN D-3 PO)        fluticasone (FLONASE) 50 MCG/ACT nasal spray Spray 1-2 sprays into both nostrils daily 1 Package 11     FOLIC ACID PO Take 800 mcg by mouth daily       losartan-hydrochlorothiazide (HYZAAR) 100-25 MG tablet Take 1 tablet by mouth daily 90 tablet 3     PRENATAL VITAMINS PO Take  by mouth daily.       psyllium (METAMUCIL) 58.6 % POWD Take by mouth daily       Hypertonic Nasal Wash (SINUS RINSE BOTTLE KIT NA) Spray 1 packet in nostril as needed       ketotifen (ZADITOR/REFRESH ANTI-ITCH) 0.025 % SOLN Place 1 drop into both eyes 2 times daily       mometasone (ELOCON) 0.1 % external cream Apply thin layer to affected areas 1 to 2 times daily as needed. (Patient not taking: Reported on 2/7/2020) 15 g 3     rizatriptan (MAXALT-MLT) 5 MG ODT Take 1 tablet (5 mg) by mouth at onset of headache for migraine May repeat in 2 hours. Max 6 tablets/24 hours. (Patient not taking: Reported on 2/7/2020) 12 tablet 5     Social History     Tobacco Use     Smoking status: Never Smoker     Smokeless tobacco: Never Used   Substance Use Topics     Alcohol use: No     Alcohol/week: 0.0 - 1.0 standard  drinks     Comment: atopped in pregnancy       ROS:  CONSTITUTIONAL:NEGATIVE for fever, chills, change in weight  ENT/MOUTH: ear pain, ear congestion, decreased hearing.    RESP: There has been some chest congestion.      OBJECTIVE:  /80   Pulse 80   Temp 98.9  F (37.2  C) (Tympanic)   Resp 15   Wt 107 kg (236 lb)   SpO2 99%   BMI 34.85 kg/m    GENERAL APPEARANCE: healthy, alert and no distress  HENT: TM erythematous bilateral and TM congested/bulging bilateral  NECK: supple, nontender, no lymphadenopathy  RESP: lungs clear to auscultation - no rales, rhonchi or wheezes  CV: regular rates and rhythm, normal S1 S2, no murmur noted  SKIN: no suspicious lesions or rashes    ASSESSMENT:  Bilateral Acute Otitis Media    PLAN:  Rx:  Doxycycline  Tylenol  follow up in 10 days if not better.   See orders in Epic    Jesus Mendez MD

## 2020-02-08 ASSESSMENT — ENCOUNTER SYMPTOMS
DIARRHEA: 0
BACK PAIN: 0
DYSPNEA ON EXERTION: 0
NERVOUS/ANXIOUS: 0
ARTHRALGIAS: 0
COUGH: 0
POLYPHAGIA: 0
POLYDIPSIA: 0
CONSTIPATION: 0
FATIGUE: 0
CHILLS: 0
DEPRESSION: 0
DECREASED CONCENTRATION: 0
ABDOMINAL PAIN: 0
VOMITING: 0
INSOMNIA: 0
BRUISES/BLEEDS EASILY: 0
PANIC: 0
NAUSEA: 0
ALTERED TEMPERATURE REGULATION: 0
SWOLLEN GLANDS: 0
FEVER: 0

## 2020-02-11 ENCOUNTER — OFFICE VISIT (OUTPATIENT)
Dept: URGENT CARE | Facility: URGENT CARE | Age: 40
End: 2020-02-11
Payer: COMMERCIAL

## 2020-02-11 VITALS
TEMPERATURE: 97 F | HEART RATE: 78 BPM | OXYGEN SATURATION: 98 % | SYSTOLIC BLOOD PRESSURE: 130 MMHG | DIASTOLIC BLOOD PRESSURE: 80 MMHG

## 2020-02-11 DIAGNOSIS — H92.03 OTALGIA OF BOTH EARS: Primary | ICD-10-CM

## 2020-02-11 PROCEDURE — 99213 OFFICE O/P EST LOW 20 MIN: CPT | Performed by: PHYSICIAN ASSISTANT

## 2020-02-11 NOTE — PROGRESS NOTES
SUBJECTIVE:  Ruma Vega is a 39 year old female who presents with right ear pain for 5 day(s).   Severity: moderate   Timing:still present  Additional symptoms include sinus infection before.      History of recurrent otitis: Yes    Past Medical History:   Diagnosis Date     Factor V Leiden (H)      Gestational HTN      Hypertension      Rhinitis, allergic      Current Outpatient Medications   Medication Sig Dispense Refill     Cholecalciferol (VITAMIN D-3 PO)        fluticasone (FLONASE) 50 MCG/ACT nasal spray Spray 1-2 sprays into both nostrils daily 1 Package 11     Hypertonic Nasal Wash (SINUS RINSE BOTTLE KIT NA) Spray 1 packet in nostril as needed       losartan-hydrochlorothiazide (HYZAAR) 100-25 MG tablet Take 1 tablet by mouth daily 90 tablet 3     psyllium (METAMUCIL) 58.6 % POWD Take by mouth daily       doxycycline monohydrate (MONODOX) 100 MG capsule Take 1 capsule (100 mg) by mouth 2 times daily for 10 days 20 capsule 0     FOLIC ACID PO Take 800 mcg by mouth daily       ketotifen (ZADITOR/REFRESH ANTI-ITCH) 0.025 % SOLN Place 1 drop into both eyes 2 times daily       mometasone (ELOCON) 0.1 % external cream Apply thin layer to affected areas 1 to 2 times daily as needed. (Patient not taking: Reported on 2/7/2020) 15 g 3     PRENATAL VITAMINS PO Take  by mouth daily.       rizatriptan (MAXALT-MLT) 5 MG ODT Take 1 tablet (5 mg) by mouth at onset of headache for migraine May repeat in 2 hours. Max 6 tablets/24 hours. (Patient not taking: Reported on 2/7/2020) 12 tablet 5     Social History     Tobacco Use     Smoking status: Never Smoker     Smokeless tobacco: Never Used   Substance Use Topics     Alcohol use: No     Alcohol/week: 0.0 - 1.0 standard drinks     Comment: atopped in pregnancy       ROS:   10 point ROS negative except as listed above      OBJECTIVE:  /80   Pulse 78   Temp 97  F (36.1  C) (Tympanic)   SpO2 98%    EXAM:  The right TM is normal: no effusions, no erythema,  and normal landmarks     The right auditory canal is normal and without drainage, edema or erythema  The left TM is normal: no effusions, no erythema, and normal landmarks  The left auditory canal is normal and without drainage, edema or erythema  Oropharynx exam is normal: no lesions, erythema, adenopathy or exudate.  GENERAL: no acute distress  EYES: EOMI,  PERRL, conjunctiva clear  NECK: supple, non-tender to palpation, no adenopathy noted  RESP: lungs clear to auscultation - no rales, rhonchi or wheezes  CV: regular rates and rhythm, normal S1 S2, no murmur noted  SKIN: no suspicious lesions or rashes     ASSESSMENT:  (H92.03) Otalgia of both ears  (primary encounter diagnosis)  Comment: improving  Plan: continue AB, follow up if not improved tomorrow

## 2020-05-07 ENCOUNTER — TELEPHONE (OUTPATIENT)
Dept: OBGYN | Facility: CLINIC | Age: 40
End: 2020-05-07

## 2020-05-07 DIAGNOSIS — I10 BENIGN ESSENTIAL HYPERTENSION: ICD-10-CM

## 2020-05-07 RX ORDER — LOSARTAN POTASSIUM AND HYDROCHLOROTHIAZIDE 25; 100 MG/1; MG/1
1 TABLET ORAL DAILY
Qty: 90 TABLET | Refills: 3 | Status: SHIPPED | OUTPATIENT
Start: 2020-05-07 | End: 2021-06-03

## 2020-05-07 NOTE — TELEPHONE ENCOUNTER
Ruma is calling to request refills of her losartan and hydrochlorothiazide. States she was supposed to follow up with Dr. Holder but would rather not come to clinic.    Advised per voicemail that if patient is feeling well and doing well on these medications, we can certainly refill and plan for followup when patient feels more comfortable coming to clinic. Patient could also have phone visit if desired.    Asked to call back if she has concern for symptoms or would like to schedule.    Refills sent to pharmacy.

## 2020-12-03 ENCOUNTER — ALLIED HEALTH/NURSE VISIT (OUTPATIENT)
Dept: PEDIATRICS | Facility: CLINIC | Age: 40
End: 2020-12-03
Payer: COMMERCIAL

## 2020-12-03 DIAGNOSIS — Z23 NEED FOR PROPHYLACTIC VACCINATION AND INOCULATION AGAINST INFLUENZA: Primary | ICD-10-CM

## 2020-12-03 PROCEDURE — 99207 PR NO CHARGE NURSE ONLY: CPT

## 2020-12-03 PROCEDURE — 90471 IMMUNIZATION ADMIN: CPT

## 2020-12-03 PROCEDURE — 90686 IIV4 VACC NO PRSV 0.5 ML IM: CPT

## 2021-01-15 ENCOUNTER — HEALTH MAINTENANCE LETTER (OUTPATIENT)
Age: 41
End: 2021-01-15

## 2021-01-27 NOTE — PROGRESS NOTES
Annual Well Woman Exam  2021    Reason for visit: Annual exam    HPI: Patient is a 39 yo  with history of Factor V Leiden heterozygosity, history of kidney donation to her father and chronic HTN who presents today for annual well woman exam.  Jose Manuel now 1 years old (delivered 2018).  Today, from an OBGYN standpoint doing well without any concerns today.  She had her screening mammogram this morning prior to her visit.  She does note some dryness with intercourse and is interested in names of lubricants that are recommended.     OB/GYN History:  : CS x 1 for macrosomia, 4 prior losses  Menses: Occasional spotting, noted only with wiping for a couple days here and theer  Contraception: Mirena IUD placed 2019  Pap smear history: Last 7/21/15 NILM and HPV negative no abnormal pap history, due for cotesting today  No STI history  Sexually active with     Past Medical History:   Diagnosis Date     Abnormal Pap smear 2010     Factor V Leiden (H)      Gestational HTN      Hypertension      Rhinitis, allergic      Past Surgical History:   Procedure Laterality Date      SECTION N/A 2018    Procedure:  SECTION;;  Surgeon: Agnes Wheeler MD;  [facility]     DILATION AND CURETTAGE SUCTION N/A 10/31/2015    Procedure: DILATION AND CURETTAGE SUCTION;  Surgeon: Agnes Wheeler MD;  [facility]     EXTRACTION(S) DENTAL       NEPHRECTOMY  2010    left; donor to father     Current Outpatient Medications   Medication     fluticasone (FLONASE) 50 MCG/ACT nasal spray     Hypertonic Nasal Wash (SINUS RINSE BOTTLE KIT NA)     ketotifen (ZADITOR/REFRESH ANTI-ITCH) 0.025 % SOLN     losartan-hydrochlorothiazide (HYZAAR) 100-25 MG tablet     mometasone (ELOCON) 0.1 % external cream     PRENATAL VITAMINS PO     psyllium (METAMUCIL) 58.6 % POWD     rizatriptan (MAXALT-MLT) 5 MG ODT     No current facility-administered medications for this visit.      Allergies   Allergen  "Reactions     Erythromycin      Penicillins      Sulfa Drugs      Cefdinir Hives and Rash     Social History     Tobacco Use     Smoking status: Never Smoker     Smokeless tobacco: Never Used   Substance Use Topics     Alcohol use: Not Currently     Alcohol/week: 0.0 - 1.0 standard drinks     Comment: atopped in pregnancy     Drug use: Never     Family History   Problem Relation Age of Onset     C.A.D. Maternal Grandfather      Hypertension Maternal Grandfather      Diabetes Maternal Grandmother         Type I     Kidney Disease Father         transplant     Hypertension Father      Mental Illness Father      Diabetes Father         Type 2     Thyroid Disease Father      Breast Cancer Maternal Aunt      Breast Cancer Maternal Aunt      Other Cancer Maternal Aunt         prancreatic ca     Breast Cancer Paternal Grandmother      Infertility Sister         currently pregnant wtih IVF     Breast Cancer Other      Breast Cancer Other      Thyroid Disease Sister      ROS: A complete 10 point ROS was conducted and was negative aside from that noted in the HPI    O:  /85   Pulse 101   Ht 1.753 m (5' 9\")   Wt 112.5 kg (248 lb 1.6 oz)   LMP 01/14/2021   BMI 36.64 kg/m     General: NAD, A&Ox3  Neck: No thyromegaly, No thyroid nodules appreciated  Lungs: CTA B/L  CV: RRR  Breasts: Symmetrical, No lymphadenopathy, skin changes, nipple discharge or nodules appreciated bilaterally  Abdomen: Soft, NT, ND  Genitourinary:   External Genitalia:  General appearance; normal, Hair distribution; normal, Lesions absent  Urethral Meatus:  Size normal, Location normal, Lesions absent  Urethra:  Fullness absent, Masses absent  Bladder:  Fullness absent  Vagina:  General appearance normal, Estrogen effect normal, Discharge absent, Lesions absent  Cervix:  General appearance normal, Lesions absent, Discharge absent, Tenderness absent  Uterus:  Size normal, Masses absent, Tenderness absent  Adenexa:  Masses absent, Tenderness " absent  Anus/Perineum:  Lesions absent     A/P: 39 yo  presents for annual well woman exam  1) Normal breast and pelvic exam  2) Screening for malignant neoplasm of cervix: Due for cotesting today and collected  3) Contraception: Mirena IUD in place  4) Breast cancer screening: Mammogram completed before visit today  5) Vaginal dryness: Discussed Slippery Stuff, Astroglide as options for management.  Discussed if not sufficient, could also consider use of vaginal estrogen to see if this assists with symptoms.  She will reach out if she decides to try.  6) RTC in 1 year for annual, earlier with any concerns    Agnes Wheeler MD

## 2021-01-28 ENCOUNTER — OFFICE VISIT (OUTPATIENT)
Dept: OBGYN | Facility: CLINIC | Age: 41
End: 2021-01-28
Attending: OBSTETRICS & GYNECOLOGY
Payer: COMMERCIAL

## 2021-01-28 ENCOUNTER — ANCILLARY PROCEDURE (OUTPATIENT)
Dept: MAMMOGRAPHY | Facility: CLINIC | Age: 41
End: 2021-01-28
Payer: COMMERCIAL

## 2021-01-28 VITALS
SYSTOLIC BLOOD PRESSURE: 133 MMHG | DIASTOLIC BLOOD PRESSURE: 85 MMHG | HEART RATE: 101 BPM | BODY MASS INDEX: 36.75 KG/M2 | HEIGHT: 69 IN | WEIGHT: 248.1 LBS

## 2021-01-28 DIAGNOSIS — Z12.31 VISIT FOR SCREENING MAMMOGRAM: ICD-10-CM

## 2021-01-28 DIAGNOSIS — Z12.4 SCREENING FOR MALIGNANT NEOPLASM OF CERVIX: ICD-10-CM

## 2021-01-28 DIAGNOSIS — Z01.419 ENCOUNTER FOR GYNECOLOGICAL EXAMINATION WITHOUT ABNORMAL FINDING: Primary | ICD-10-CM

## 2021-01-28 PROCEDURE — 99396 PREV VISIT EST AGE 40-64: CPT | Performed by: OBSTETRICS & GYNECOLOGY

## 2021-01-28 PROCEDURE — G0463 HOSPITAL OUTPT CLINIC VISIT: HCPCS

## 2021-01-28 PROCEDURE — 77063 BREAST TOMOSYNTHESIS BI: CPT | Mod: GC | Performed by: STUDENT IN AN ORGANIZED HEALTH CARE EDUCATION/TRAINING PROGRAM

## 2021-01-28 PROCEDURE — 77067 SCR MAMMO BI INCL CAD: CPT | Mod: GC | Performed by: STUDENT IN AN ORGANIZED HEALTH CARE EDUCATION/TRAINING PROGRAM

## 2021-01-28 PROCEDURE — 87624 HPV HI-RISK TYP POOLED RSLT: CPT | Performed by: OBSTETRICS & GYNECOLOGY

## 2021-01-28 PROCEDURE — G0145 SCR C/V CYTO,THINLAYER,RESCR: HCPCS | Performed by: OBSTETRICS & GYNECOLOGY

## 2021-01-28 ASSESSMENT — ANXIETY QUESTIONNAIRES
GAD7 TOTAL SCORE: 0
7. FEELING AFRAID AS IF SOMETHING AWFUL MIGHT HAPPEN: NOT AT ALL
6. BECOMING EASILY ANNOYED OR IRRITABLE: NOT AT ALL
7. FEELING AFRAID AS IF SOMETHING AWFUL MIGHT HAPPEN: NOT AT ALL
4. TROUBLE RELAXING: NOT AT ALL
2. NOT BEING ABLE TO STOP OR CONTROL WORRYING: NOT AT ALL
5. BEING SO RESTLESS THAT IT IS HARD TO SIT STILL: NOT AT ALL
3. WORRYING TOO MUCH ABOUT DIFFERENT THINGS: NOT AT ALL
GAD7 TOTAL SCORE: 0
1. FEELING NERVOUS, ANXIOUS, OR ON EDGE: NOT AT ALL

## 2021-01-28 ASSESSMENT — MIFFLIN-ST. JEOR: SCORE: 1854.75

## 2021-01-28 NOTE — LETTER
2021       RE: Ruma Vega  [address] [facility] [address]     Dear Colleague,    Thank you for referring your patient, Ruma Vega, to the [facility] [place] at [facility]. Please see a copy of my visit note below.    Annual Well Woman Exam  2021    Reason for visit: Annual exam    HPI: Patient is a 41 yo  with history of Factor V Leiden heterozygosity, history of kidney donation to her father and chronic HTN who presents today for annual well woman exam.  Jose Manuel now 1 years old (delivered 2018).  Today, from an OBGYN standpoint doing well without any concerns today.  She had her screening mammogram this morning prior to her visit.  She does note some dryness with intercourse and is interested in names of lubricants that are recommended.     OB/GYN History:  : CS x 1 for macrosomia, 4 prior losses  Menses: Occasional spotting, noted only with wiping for a couple days here and theer  Contraception: Mirena IUD placed 2019  Pap smear history: Last 7/21/15 NILM and HPV negative no abnormal pap history, due for cotesting today  No STI history  Sexually active with     Past Medical History:   Diagnosis Date     Abnormal Pap smear 2010     Factor V Leiden (H)      Gestational HTN      Hypertension      Rhinitis, allergic      Past Surgical History:   Procedure Laterality Date      SECTION N/A 2018    Procedure:  SECTION;;  Surgeon: Agnes Wheeler MD;  [facility]     DILATION AND CURETTAGE SUCTION N/A 10/31/2015    Procedure: DILATION AND CURETTAGE SUCTION;  Surgeon: Agnes Wheeler MD;  [facility]     EXTRACTION(S) DENTAL       NEPHRECTOMY  2010    left; donor to father     Current Outpatient Medications   Medication     fluticasone (FLONASE) 50 MCG/ACT nasal spray     Hypertonic Nasal Wash (SINUS RINSE BOTTLE KIT NA)     ketotifen (ZADITOR/REFRESH  "ANTI-ITCH) 0.025 % SOLN     losartan-hydrochlorothiazide (HYZAAR) 100-25 MG tablet     mometasone (ELOCON) 0.1 % external cream     PRENATAL VITAMINS PO     psyllium (METAMUCIL) 58.6 % POWD     rizatriptan (MAXALT-MLT) 5 MG ODT     No current facility-administered medications for this visit.      Allergies   Allergen Reactions     Erythromycin      Penicillins      Sulfa Drugs      Cefdinir Hives and Rash     Social History     Tobacco Use     Smoking status: Never Smoker     Smokeless tobacco: Never Used   Substance Use Topics     Alcohol use: Not Currently     Alcohol/week: 0.0 - 1.0 standard drinks     Comment: atopped in pregnancy     Drug use: Never     Family History   Problem Relation Age of Onset     C.A.D. Maternal Grandfather      Hypertension Maternal Grandfather      Diabetes Maternal Grandmother         Type I     Kidney Disease Father         transplant     Hypertension Father      Mental Illness Father      Diabetes Father         Type 2     Thyroid Disease Father      Breast Cancer Maternal Aunt      Breast Cancer Maternal Aunt      Other Cancer Maternal Aunt         prancreatic ca     Breast Cancer Paternal Grandmother      Infertility Sister         currently pregnant wtih IVF     Breast Cancer Other      Breast Cancer Other      Thyroid Disease Sister      ROS: A complete 10 point ROS was conducted and was negative aside from that noted in the HPI    O:  /85   Pulse 101   Ht 1.753 m (5' 9\")   Wt 112.5 kg (248 lb 1.6 oz)   LMP 01/14/2021   BMI 36.64 kg/m     General: NAD, A&Ox3  Neck: No thyromegaly, No thyroid nodules appreciated  Lungs: CTA B/L  CV: RRR  Breasts: Symmetrical, No lymphadenopathy, skin changes, nipple discharge or nodules appreciated bilaterally  Abdomen: Soft, NT, ND  Genitourinary:   External Genitalia:  General appearance; normal, Hair distribution; normal, Lesions absent  Urethral Meatus:  Size normal, Location normal, Lesions absent  Urethra:  Fullness absent, " Masses absent  Bladder:  Fullness absent  Vagina:  General appearance normal, Estrogen effect normal, Discharge absent, Lesions absent  Cervix:  General appearance normal, Lesions absent, Discharge absent, Tenderness absent  Uterus:  Size normal, Masses absent, Tenderness absent  Adenexa:  Masses absent, Tenderness absent  Anus/Perineum:  Lesions absent     A/P: 39 yo  presents for annual well woman exam  1) Normal breast and pelvic exam  2) Screening for malignant neoplasm of cervix: Due for cotesting today and collected  3) Contraception: Mirena IUD in place  4) Breast cancer screening: Mammogram completed before visit today  5) Vaginal dryness: Discussed Slippery Stuff, Astroglide as options for management.  Discussed if not sufficient, could also consider use of vaginal estrogen to see if this assists with symptoms.  She will reach out if she decides to try.  6) RTC in 1 year for annual, earlier with any concerns    Agnes Wheeler MD

## 2021-01-29 ASSESSMENT — ANXIETY QUESTIONNAIRES: GAD7 TOTAL SCORE: 0

## 2021-02-03 LAB
COPATH REPORT: NORMAL
PAP: NORMAL

## 2021-02-04 LAB
FINAL DIAGNOSIS: NORMAL
HPV HR 12 DNA CVX QL NAA+PROBE: NEGATIVE
HPV16 DNA SPEC QL NAA+PROBE: NEGATIVE
HPV18 DNA SPEC QL NAA+PROBE: NEGATIVE
SPECIMEN DESCRIPTION: NORMAL
SPECIMEN SOURCE CVX/VAG CYTO: NORMAL

## 2021-02-08 ENCOUNTER — TRANSCRIBE ORDERS (OUTPATIENT)
Dept: OTHER | Age: 41
End: 2021-02-08

## 2021-02-08 ENCOUNTER — DOCUMENTATION ONLY (OUTPATIENT)
Dept: ONCOLOGY | Facility: CLINIC | Age: 41
End: 2021-02-08

## 2021-02-08 DIAGNOSIS — Z80.3 FAMILY HISTORY OF BREAST CANCER: Primary | ICD-10-CM

## 2021-02-08 NOTE — PROGRESS NOTES
Action    Action Taken 2/8/21:    -Spoke w/ patient, pt has not had relative genetic testing done. Had Genetic testing done for Donor Related purposes (9/1/2010) & for Fertility related purposes (2/20/18). Pt advised she did have a copy of her Aunt's Genetic Testing (BRCA/Breast Ca, 2008) she could get to us.     -IB to clinic.  11:51 AM       RECORDS STATUS - BREAST    RECORDS REQUESTED FROM:    DATE REQUESTED:    NOTES DETAILS STATUS   OFFICE NOTE from referring provider Epic 1/28/21: Dr. Wheeler   OFFICE NOTE from medical oncologist     OFFICE NOTE from surgeon     OFFICE NOTE from radiation oncologist     DISCHARGE SUMMARY from hospital     DISCHARGE REPORT from the ER     OPERATIVE REPORT     MEDICATION LIST     CLINICAL TRIAL TREATMENTS TO DATE     LABS     PATHOLOGY REPORTS  (Tissue diagnosis, Stage, ER/IL percentage positive and intensity of staining, HER2 IHC, FISH, and all biopsies from breast and any distant metastasis)                     GENONOMIC TESTING     TYPE:   (Next Generation Sequencing, including Foundation One testing, and Oncotype score) Epic 9/1/2010, 2/20/2018   IMAGING (NEED IMAGES & REPORT)     CT SCANS     MRI     MAMMO     ULTRASOUND     PET     BONE SCAN     BRAIN MRI

## 2021-02-09 NOTE — TELEPHONE ENCOUNTER
ONCOLOGY INTAKE: Records Information      APPT INFORMATION:  Referring provider:  Self  Referring provider s clinic:  NA  Reason for visit/diagnosis: Family history of breast cancer   Has patient been notified of appointment date and time?: Yes    RECORDS INFORMATION:  Were the records received with the referral (via [employer])? Yes    Has patient been seen for any external appt for this diagnosis? NA    If yes, where? NA      ADDITIONAL INFORMATION:  Pt scheduled in person per protocol.

## 2021-02-15 NOTE — PROGRESS NOTES
NEW CONSULTATION   2021     Ruma Vega is a 40 year old woman who presents with family history of breast cancer.     HPI:    She presents with family history of breast cancer. Her paternal grandmother was diagnosed with breast cancer at age 49. Her maternal aunt with diagnosed with breast cancer at age 51 and also was diagnosed with melanoma recently. This aunt had negative genetic testing in . She had another maternal aunt diagnosed with breast cancer at age 56 and also had pancreatic cancer.     She denies any breast concerns including mass, skin change, nipple inversion or nipple discharge.      BREAST-SPECIFIC HISTORY:    Previous breast imaging: Yes   - 16 screening mammogram: BI-RADS 1  - 19 screening mammogram BI-RADS 1  - 21 screening mammogram BI-RADS 1    Prior breast biopsies/surgeries: No    Prior history of breast cancer: No  Prior radiation history: No   Self breast exams: Yes  Breast density: scattered fibroglandular densities    GYN HISTORY:  . Age at 1st pregnancy: 37. Breastfeeding history: Yes.   Age at menarche: 11  Menopausal: premenopausal.   Menopausal hormone replacement therapy: No     RISK ASSESSMENT: < 1.7% 5 year risk and > 20% lifetime risk   Tiffany: 0.8% 5 year risk   MAISHA/Tyrer-Cuzick: 30.3% lifetime risk   Niraj: 15.9% lifetime risk    FAMILY HISTORY:  Breast ca: Yes   - paternal grandmother, 49  Passed 56 of other causes   - maternal aunt, 51 still living, genetic testing negative    - maternal aunt, 56 still living  Ovarian ca: No  Pancreatic ca: Yes   - maternal aunt   Prostate: No  Gastric ca: No  Melanoma: Yes   - maternal aunt   Colon ca: No  Other cancer: No  Other genetic, testing, syndromes, or clotting disorders: no     PAST MEDICAL HISTORY  Past Medical History:   Diagnosis Date     Abnormal Pap smear 2010     Factor V Leiden (H)      Gestational HTN      Hypertension      Rhinitis, allergic    Factor V Leiden    PAST SURGICAL  HISTORY   Past Surgical History:   Procedure Laterality Date      SECTION N/A 2018    Procedure:  SECTION;;  Surgeon: Agnes Wheeler MD;  [facility]     DILATION AND CURETTAGE SUCTION N/A 10/31/2015    Procedure: DILATION AND CURETTAGE SUCTION;  Surgeon: Agnes Wheeler MD;  [facility]     EXTRACTION(S) DENTAL       NEPHRECTOMY  2010    left; donor to father     MEDICATIONS  Current Outpatient Medications   Medication Sig Dispense Refill     fluticasone (FLONASE) 50 MCG/ACT nasal spray Spray 1-2 sprays into both nostrils daily 1 Package 11     Hypertonic Nasal Wash (SINUS RINSE BOTTLE KIT NA) Spray 1 packet in nostril as needed       ketotifen (ZADITOR/REFRESH ANTI-ITCH) 0.025 % SOLN Place 1 drop into both eyes 2 times daily       losartan-hydrochlorothiazide (HYZAAR) 100-25 MG tablet Take 1 tablet by mouth daily 90 tablet 3     mometasone (ELOCON) 0.1 % external cream Apply thin layer to affected areas 1 to 2 times daily as needed. (Patient not taking: Reported on 2020) 15 g 3     PRENATAL VITAMINS PO Take  by mouth daily.       psyllium (METAMUCIL) 58.6 % POWD Take by mouth daily       rizatriptan (MAXALT-MLT) 5 MG ODT Take 1 tablet (5 mg) by mouth at onset of headache for migraine May repeat in 2 hours. Max 6 tablets/24 hours. (Patient not taking: Reported on 2020) 12 tablet 5   Contraception: IUD     ALLERGIES  Allergies   Allergen Reactions     Erythromycin      Penicillins      Sulfa Drugs      Cefdinir Hives and Rash      SOCIAL HISTORY:  Smokes: No  EtOH: No  Exercise: yes, job, tennis      ROS:  Change in vision No  Headaches: no  Respiratory: No shortness of breath, dyspnea on exertion, cough, or hemoptysis   Cardiovascular: negative   Gastrointestinal: negative Abdominal pain: no  Breast: negative   Musculoskeletal: negative Joint pain No Back pain: no  Psychiatric: negative  Hematologic/Lymphatic/Immunologic: negative  Endocrine: negative    EXAM  BP  "(!) 154/96 (BP Location: Right arm, Patient Position: Sitting, Cuff Size: Adult Large)   Pulse 109   Temp 98.2  F (36.8  C) (Oral)   Resp 16   Ht 1.753 m (5' 9.02\")   Wt 115.7 kg (255 lb 1.6 oz)   SpO2 99%   BMI 37.65 kg/m     PHYSICAL EXAM  Respiratory: breathing non labored.   Breasts: Examination was done in both the upright and supine positions.  Breasts are symmetrical . No dominant fixed or suspicious masses noted. No skin or nipple changes. No nipple discharge.   No clavicular, cervical, or axillary lymphadenopathy.       ASSESSMENT/PLAN:    Ruma Vega is a 40 year old woman with family history of breast cancer. She meets NCCN guideline for high risk screening based on her family history of breast cancer.     1) Family history of breast cancer  She meets NCCN guidelines for high risk screening based on family history with lifetime risk for breast cancer of >20%. Screening recommendations based on NCCN guidelines. Clinical encounter every 6-12 month. Annual mammogram with sanjuana alternating with annual breast MRI.  - Referral to Genetic Counseling.   - Breast MRI with clinic visit due 7/2021  - Screening mammogram with clinic visit due 1/29/22    2) Lifestyle Modifications were provided.   - Maintain a healthy weight. Recommended BMI is 20-25. Higher body mass index (BMI) and adult weight gain is associated with increased risk for breast cancer. This increase in risk has been attributed to increase in circulating endogenous estrogen levels from fat tissue.   - Alcohol consumption, even at moderate levels (1-2 drinks per day), increases breast cancer risk. Limit alcohol consumption to less than 1 drink per day. (1 ounce of liquor, 6 ounces of wine, or 8 ounces of beer)  - Exercise a minimum of 2.4 hours per week of moderate-intensity aerobic activity.     Pamela Valdivia PA-C    35 minutes spent on the date of the encounter doing chart review, review of test results, interpretation of tests, " patient visit and documentation.

## 2021-02-17 ENCOUNTER — PRE VISIT (OUTPATIENT)
Dept: ONCOLOGY | Facility: CLINIC | Age: 41
End: 2021-02-17

## 2021-02-17 ENCOUNTER — OFFICE VISIT (OUTPATIENT)
Dept: SURGERY | Facility: CLINIC | Age: 41
End: 2021-02-17
Attending: PHYSICIAN ASSISTANT
Payer: COMMERCIAL

## 2021-02-17 VITALS
SYSTOLIC BLOOD PRESSURE: 154 MMHG | HEIGHT: 69 IN | OXYGEN SATURATION: 99 % | WEIGHT: 255.1 LBS | DIASTOLIC BLOOD PRESSURE: 96 MMHG | RESPIRATION RATE: 16 BRPM | TEMPERATURE: 98.2 F | BODY MASS INDEX: 37.78 KG/M2 | HEART RATE: 109 BPM

## 2021-02-17 DIAGNOSIS — Z91.89 AT HIGH RISK FOR BREAST CANCER: Primary | ICD-10-CM

## 2021-02-17 DIAGNOSIS — Z80.3 FAMILY HISTORY OF BREAST CANCER: ICD-10-CM

## 2021-02-17 PROCEDURE — G0463 HOSPITAL OUTPT CLINIC VISIT: HCPCS

## 2021-02-17 PROCEDURE — 99203 OFFICE O/P NEW LOW 30 MIN: CPT | Performed by: PHYSICIAN ASSISTANT

## 2021-02-17 ASSESSMENT — MIFFLIN-ST. JEOR: SCORE: 1886.76

## 2021-02-17 ASSESSMENT — PAIN SCALES - GENERAL: PAINLEVEL: NO PAIN (0)

## 2021-02-17 NOTE — NURSING NOTE
"Oncology Rooming Note    February 17, 2021 2:07 PM   Ruma Vega is a 40 year old female who presents for:    Chief Complaint   Patient presents with     Oncology Clinic Visit     NEW; FAMILY HISTORY OF BREAST CANCER     Initial Vitals: BP (!) 154/96 (BP Location: Right arm, Patient Position: Sitting, Cuff Size: Adult Large)   Pulse 109   Temp 98.2  F (36.8  C) (Oral)   Resp 16   Ht 1.753 m (5' 9.02\")   Wt 115.7 kg (255 lb 1.6 oz)   SpO2 99%   BMI 37.65 kg/m   Estimated body mass index is 37.65 kg/m  as calculated from the following:    Height as of this encounter: 1.753 m (5' 9.02\").    Weight as of this encounter: 115.7 kg (255 lb 1.6 oz). Body surface area is 2.37 meters squared.  No Pain (0) Comment: Data Unavailable   No LMP recorded. (Menstrual status: IUD).  Allergies reviewed: Yes  Medications reviewed: Yes    Medications: Medication refills not needed today.  Pharmacy name entered into "[employer]:    [facility] - [place], [place] - [address] [place] [address] [id number] - [place], [place] - [address]    Clinical concerns: No new concerns.        Olga Saul CMA              "

## 2021-02-17 NOTE — LETTER
2021         RE: Ruma Vega  [address] [place] [address]        Dear Colleague,    Thank you for referring your patient, Ruma Vega, to the [facility]. Please see a copy of my visit note below.    NEW CONSULTATION   2021     Ruma Vega is a 40 year old woman who presents with family history of breast cancer.     HPI:    She presents with family history of breast cancer. Her paternal grandmother was diagnosed with breast cancer at age 49. Her maternal aunt with diagnosed with breast cancer at age 51 and also was diagnosed with melanoma recently. This aunt had negative genetic testing in . She had another maternal aunt diagnosed with breast cancer at age 56 and also had pancreatic cancer.     She denies any breast concerns including mass, skin change, nipple inversion or nipple discharge.      BREAST-SPECIFIC HISTORY:    Previous breast imaging: Yes   - 16 screening mammogram: BI-RADS 1  - 19 screening mammogram BI-RADS 1  - 21 screening mammogram BI-RADS 1    Prior breast biopsies/surgeries: No    Prior history of breast cancer: No  Prior radiation history: No   Self breast exams: Yes  Breast density: scattered fibroglandular densities    GYN HISTORY:  . Age at 1st pregnancy: 37. Breastfeeding history: Yes.   Age at menarche: 11  Menopausal: premenopausal.   Menopausal hormone replacement therapy: No     RISK ASSESSMENT: < 1.7% 5 year risk and > 20% lifetime risk   Tiffany: 0.8% 5 year risk   MAISHA/Tyrer-Cuzick: 30.3% lifetime risk   Niraj: 15.9% lifetime risk    FAMILY HISTORY:  Breast ca: Yes   - paternal grandmother, 49  Passed 56 of other causes   - maternal aunt, 51 still living, genetic testing negative    - maternal aunt, 56 still living  Ovarian ca: No  Pancreatic ca: Yes   - maternal aunt   Prostate: No  Gastric ca: No  Melanoma: Yes   - maternal aunt   Colon ca: No  Other cancer: No  Other  genetic, testing, syndromes, or clotting disorders: no     PAST MEDICAL HISTORY  Past Medical History:   Diagnosis Date     Abnormal Pap smear 2010     Factor V Leiden (H)      Gestational HTN      Hypertension      Rhinitis, allergic    Factor V Leiden    PAST SURGICAL HISTORY   Past Surgical History:   Procedure Laterality Date      SECTION N/A 2018    Procedure:  SECTION;;  Surgeon: Agnes Wheeler MD;  Location: [facility] L[facility]D     DILATION AND CURETTAGE SUCTION N/A 10/31/2015    Procedure: DILATION AND CURETTAGE SUCTION;  Surgeon: Agnes Wheeler MD;  Location: [facility] OR     EXTRACTION(S) DENTAL       NEPHRECTOMY  2010    left; donor to father     MEDICATIONS  Current Outpatient Medications   Medication Sig Dispense Refill     fluticasone (FLONASE) 50 MCG/ACT nasal spray Spray 1-2 sprays into both nostrils daily 1 Package 11     Hypertonic Nasal Wash (SINUS RINSE BOTTLE KIT NA) Spray 1 packet in nostril as needed       ketotifen (ZADITOR/REFRESH ANTI-ITCH) 0.025 % SOLN Place 1 drop into both eyes 2 times daily       losartan-hydrochlorothiazide (HYZAAR) 100-25 MG tablet Take 1 tablet by mouth daily 90 tablet 3     mometasone (ELOCON) 0.1 % external cream Apply thin layer to affected areas 1 to 2 times daily as needed. (Patient not taking: Reported on 2020) 15 g 3     PRENATAL VITAMINS PO Take  by mouth daily.       psyllium (METAMUCIL) 58.6 % POWD Take by mouth daily       rizatriptan (MAXALT-MLT) 5 MG ODT Take 1 tablet (5 mg) by mouth at onset of headache for migraine May repeat in 2 hours. Max 6 tablets/24 hours. (Patient not taking: Reported on 2020) 12 tablet 5   Contraception: IUD     ALLERGIES  Allergies   Allergen Reactions     Erythromycin      Penicillins      Sulfa Drugs      Cefdinir Hives and Rash      SOCIAL HISTORY:  Smokes: No  EtOH: No  Exercise: yes, job, tennis      ROS:  Change in vision No  Headaches: no  Respiratory: No shortness of breath, dyspnea on exertion,  "cough, or hemoptysis   Cardiovascular: negative   Gastrointestinal: negative Abdominal pain: no  Breast: negative   Musculoskeletal: negative Joint pain No Back pain: no  Psychiatric: negative  Hematologic/Lymphatic/Immunologic: negative  Endocrine: negative    EXAM  BP (!) 154/96 (BP Location: Right arm, Patient Position: Sitting, Cuff Size: Adult Large)   Pulse 109   Temp 98.2  F (36.8  C) (Oral)   Resp 16   Ht 1.753 m (5' 9.02\")   Wt 115.7 kg (255 lb 1.6 oz)   SpO2 99%   BMI 37.65 kg/m     PHYSICAL EXAM  Respiratory: breathing non labored.   Breasts: Examination was done in both the upright and supine positions.  Breasts are symmetrical . No dominant fixed or suspicious masses noted. No skin or nipple changes. No nipple discharge.   No clavicular, cervical, or axillary lymphadenopathy.       ASSESSMENT/PLAN:    Ruma Vega is a 40 year old woman with family history of breast cancer. She meets NCCN guideline for high risk screening based on her family history of breast cancer.     1) Family history of breast cancer  She meets NCCN guidelines for high risk screening based on family history with lifetime risk for breast cancer of >20%. Screening recommendations based on NCCN guidelines. Clinical encounter every 6-12 month. Annual mammogram with sanjuana alternating with annual breast MRI.  - Referral to Genetic Counseling.   - Breast MRI with clinic visit due 7/2021  - Screening mammogram with clinic visit due 1/29/22    2) Lifestyle Modifications were provided.   - Maintain a healthy weight. Recommended BMI is 20-25. Higher body mass index (BMI) and adult weight gain is associated with increased risk for breast cancer. This increase in risk has been attributed to increase in circulating endogenous estrogen levels from fat tissue.   - Alcohol consumption, even at moderate levels (1-2 drinks per day), increases breast cancer risk. Limit alcohol consumption to less than 1 drink per day. (1 ounce of " liquor, 6 ounces of wine, or 8 ounces of beer)  - Exercise a minimum of 2.4 hours per week of moderate-intensity aerobic activity.     Pamela Valdivia PA-C    35 minutes spent on the date of the encounter doing chart review, review of test results, interpretation of tests, patient visit and documentation.         Again, thank you for allowing me to participate in the care of your patient.        Sincerely,        Pamela Valdivia PA-C

## 2021-02-17 NOTE — PATIENT INSTRUCTIONS
Ruma Vega is a 40 year old woman with family history of breast cancer. She meets NCCN guideline for high risk screening based on her family history of breast cancer.     1) Family history of breast cancer  She meets NCCN guidelines for high risk screening based on family history with lifetime risk for breast cancer of >20%. Screening recommendations based on NCCN guidelines. Clinical encounter every 6-12 month. Annual mammogram with sanjuana alternating with annual breast MRI.  - Referral to Genetic Counseling.   - Breast MRI with clinic visit due 7/2021  - Screening mammogram with clinic visit due 1/29/22    2) Lifestyle Modifications were provided.   - Maintain a healthy weight. Recommended BMI is 20-25. Higher body mass index (BMI) and adult weight gain is associated with increased risk for breast cancer. This increase in risk has been attributed to increase in circulating endogenous estrogen levels from fat tissue.   - Alcohol consumption, even at moderate levels (1-2 drinks per day), increases breast cancer risk. Limit alcohol consumption to less than 1 drink per day. (1 ounce of liquor, 6 ounces of wine, or 8 ounces of beer)  - Exercise a minimum of 2.4 hours per week of moderate-intensity aerobic activity.

## 2021-02-18 NOTE — TELEPHONE ENCOUNTER
ONCOLOGY INTAKE: Records Information      APPT INFORMATION:  Referring provider:  Pamela Valdivia PA-C  Referring provider s clinic:  [facility]  Reason for visit/diagnosis: Family history of breast cancer  Has patient been notified of appointment date and time?: Yes    RECORDS INFORMATION:  Were the records received with the referral (via [employer])? No    Has patient been seen for any external appt for this diagnosis? No    If yes, where? N/a    Has patient had any imaging or procedures outside of [facility] for this condition? No      If Yes, where? N/a    ADDITIONAL INFORMATION:  None

## 2021-02-28 NOTE — PROGRESS NOTES
3/1/2021    Ruma is a 40 year old who is being evaluated via a billable video visit.      Video-Visit Details    Type of service: Video Visit    Video Start Time: 02:12 pm  Video End Time: 03:46 pm    Originating Location (pt. Location): Home    Distant Location (provider location): [facility] Risk Management [facility]    Platform used for Video Visit: [employer]aurus    Referring Provider: Pamela Valdivia PA-C    Presenting Information:   I spoke with Ruma Vega over video today for genetic counseling to discuss her family history of cancer. With her permission, this appointment was conducted over video due to COVID-19 precautions. We talked today to review this history, cancer screening recommendations, and available genetic testing options.    Personal History:  Ruma is a 40 year old female. She does not have any personal history of cancer.    She had her first menstrual period at age 10 or 11, her first child at age 37, and is premenopausal. Ruma has her ovaries, fallopian tubes and uterus in place. She reports that she has not used hormone replacement therapy. She has an IUD (Mirena) and used non-estrogen oral contraceptives in the past. She also used multiple different fertility medications (low dose, brief time) between 8425-3061. She has clinical breast exams and mammograms; her most recent mammogram on 1/28/21 was negative. She is planning for breast MRI in July. Ruma has not had a colonoscopy due to her age. Ruma reported no history of tobacco use and rare alcohol use.    Family History: (Please see scanned pedigree for detailed family history information)  Maternal:  Her mother is 67 years old with no known history of cancer.   Her maternal aunt is 73 years old and was first diagnosed with breast cancer at age 51 and treated with lumpectomy and radiation. At age 70 she was diagnosed with 2 separate spots of cutaneous lymphoma, which were reportedly related to two tick bites (hairline, ear). Also at  age 70 she was diagnosed with breast cancer (same breast and type as her first diagnosis, Ruma thinks most likely a recurrence). She had another lumpectomy and possibly radiation. At age 70 she was diagnosed with melanoma (nose) and had surgery and reconstruction. She underwent genetic testing first in 2008 (BRCA1 sequencing and 5-site rearrangement and BRCA2 sequencing, [facility]), which was negative. In March 2020 she underwent updated genetic testing through the [facility]. Testing was performed at [facility] using a 147 multi-gene cancer panel. No mutations were detected with this testing. Three variants of uncertain significance (VUS) were detected: EXT1 c.337G>A, GEN1 c.824G>A, and RECQL4 c.3532G>A. Her genetic testing results and genetic counseling results letter were sent to me for review today.  Her other aunt is 76 years old and was diagnosed with breast cancer at age 56. She had previously had an abnormal mammogram at age 47 and had a biopsy that was negative. She then developed her breast cancer in a spot right next to where this biopsy had been. Treatment included chemotherapy, radiation, and lumpectomy. She was then diagnosed with pancreatic cancer at age 69. She had a whipple procedure and chemotherapy. She has had 6 basal cell carcinomas, most recently one that was infiltrating. She has had a lot of sun exposure. She has previously declined genetic testing, but recently has indicated that she may be willing to test.  Her grandmother passed away at age 79. She had a history of abnormal mammograms but no known cancer.  Her mother (Ruma's great grandmother) was diagnosed with breast cancer in her mid 70s. She later had a recurrence and passed away at age 83.  Paternal:  Her father is 70 years old with no known history of cancer. He has had colon polyps in the past.  Her grandmother was diagnosed with breast cancer at age 49. Treatment included bilateral mastectomy,  chemotherapy, and radiation. She passed away at age 56 (non-cancer reason).  Her grandfather was diagnosed with kidney cancer at age 59. This was metastatic. Treatment included chemotherapy. He passed away at age 59. He had a history of heavy smoking.    Her maternal ethnicity is Martiniquais, Marshallese, and [place]bourgish. Her paternal ethnicity is Trinidadian. There is no known Ashkenazi Evangelical ancestry on either side of her family.     Discussion:    Ruma's family history of cancer is suggestive of a hereditary cancer syndrome.    We reviewed the features of sporadic, familial, and hereditary cancers. In looking at Ruma's family history, it is possible that a cancer susceptibility gene is present due to her maternal family history of breast cancer, pancreatic cancer, and melanoma.    We spent some time discussing the most informative approach to genetic testing. In families suspicious for a hereditary cancer syndrome, it is always most informative to begin testing with a family member who has a history of cancer. When we begin testing in someone who has not had cancer, a positive result (detected gene mutation) would be informative; however, a negative result (no gene mutation detected) would be uninformative. Uninformative results provide little new information about cancer risks. The most informative candidates for genetic testing in Ruma's family are her maternal aunts who have had cancers. As described above, one of her aunts has already undergone comprehensive genetic testing with no known mutations detected. Her other aunt is considering testing. We discussed the option to wait until we have genetic test results available from both of her aunts for review or to proceed with testing for herself. She understands that if no mutations are detected in either of her aunts, then testing for Ruma's mother would not be indicated. If a mutation is found in her aunt, then her mother would have a 50% chance to have the  same mutation. If her mother were found to have this mutation, then Ruma and her siblings would each have a 50% chance to have the same mutation. She stated that she understands the limitations of interpreting her own test results in the absence of having test results from both of her aunts first. She understands the importance of sharing additional genetic testing information from her family members with me when available to allow for more complete risk assessment. She stated that she would also like to proceed with testing for herself due to her paternal family history of cancers and smaller family with more limited information.    Her maternal aunt's genetic testing revealed three variants of uncertain significance. We reviewed that a variant of uncertain significance (VUS) is a variation in a gene, but it is unclear how this impacts cancer risk in the family. It may be a benign change that does not cause cancer risk, or it may be a mutation that causes an increased risk for cancer. We discussed that medical management for individuals is typically not changed on the basis of a VUS.     We discussed the natural history and genetics of hereditary cancer. A detailed handout regarding genes in which mutations are associated with an increased risk for breast and pancreatic cancer will be provided to Ruma via [employer] and can be found in the after visit summary. Topics included: inheritance pattern, cancer risks, cancer screening recommendations, and also risks, benefits and limitations of testing.    Based on her personal and family history, Ruma meets current National Comprehensive Cancer Network (NCCN) criteria for genetic testing of high-penetrance breast and/or ovarian cancer susceptibility genes, which often includes BRCA1, BRCA2, CDH1, PALB2, PTEN, and TP53 among others.     We discussed that there are additional genes that could cause increased risk for breast and pancreatic cancer. As many of these  "genes present with overlapping features in a family and accurate cancer risk cannot always be established based upon the pedigree analysis alone, it would be reasonable for Ruma to consider panel genetic testing to analyze multiple genes at once.    We reviewed genetic testing options for Ruma based on her family history of cancers, including the option of a smaller panel of genes associated with certain cancers (for example breast and/or pancreatic) or larger panel options to include genes associated with multiple different types of cancer. Ruma expressed an interest in learning as much information as possible from the testing. We discussed expanded panel options including the [facility] Common Hereditary Cancers Panel or [facility] Multi-Cancer panel. She opted to proceed with a testing panel similar to the one her aunt had at [facility]: an expanded Custom panel (150 genes associated with an increased risk for multiple different types of cancer).  We discussed that many genes on this panel are associated with specific hereditary cancer syndromes and have published management guidelines. Other genes have medical management guidelines available to screen for certain cancers. The remaining genes are associated with increased cancer risk and may allow us to make medical recommendations when mutations are identified. Some of the genes on this expanded panel may have limited information available about specific cancer risks and therefore, there may be limited screening guidelines available. We discussed that a number of the genes on this large panel are considered \"preliminary evidence\" genes and have limited information available. She stated that she understood potential limitations of a larger gene panel.     Due to COVID-19 precautions consent was obtained over video today. This is indicated on the consent form, which also includes my signature (as the provider who obtained consent). Genetic testing via an expanded " Custom panel (150 genes associated with an increased risk for multiple different types of cancer) will be sent to [facility]. Ruma opted to have her blood drawn for testing and will schedule an appointment for this. Turnaround time from date when sample is received at the lab: approximately 3-4 weeks.    Medical Management: For Ruma, we reviewed that the information from genetic testing may determine:    additional cancer screening for which Ruma may qualify (i.e. mammogram and breast MRI, more frequent colonoscopies, more frequent dermatologic exams, etc.),    options for risk reducing surgeries Ruma could consider (i.e. bilateral mastectomy, surgery to remove her ovaries and/or uterus, etc.),      and targeted chemotherapies if she were to develop certain cancers in the future (i.e. immunotherapy for individuals with Alfredo syndrome, PARP inhibitors, etc.).     These recommendations will be discussed in detail once genetic testing is completed.     Plan:  1) Today Ruma elected to proceed with genetic testing via an expanded Custom panel (150 genes associated with an increased risk for multiple different types of cancer) offered by [facility].  2) This information should be available in 4-5 weeks.  3) Ruma will return to clinic to discuss the results.    Time spent over video: 94 minutes    Tamara Esparza MS, [facility]  Licensed, Certified Genetic Counselor  Office: [phone]  Email: [email or web address]

## 2021-03-01 ENCOUNTER — VIRTUAL VISIT (OUTPATIENT)
Dept: ONCOLOGY | Facility: CLINIC | Age: 41
End: 2021-03-01
Attending: PHYSICIAN ASSISTANT
Payer: COMMERCIAL

## 2021-03-01 ENCOUNTER — PRE VISIT (OUTPATIENT)
Dept: ONCOLOGY | Facility: CLINIC | Age: 41
End: 2021-03-01

## 2021-03-01 DIAGNOSIS — Z80.0 FAMILY HISTORY OF PANCREATIC CANCER: ICD-10-CM

## 2021-03-01 DIAGNOSIS — Z80.8 FAMILY HISTORY OF MELANOMA: ICD-10-CM

## 2021-03-01 DIAGNOSIS — Z80.3 FAMILY HISTORY OF MALIGNANT NEOPLASM OF BREAST: Primary | ICD-10-CM

## 2021-03-01 DIAGNOSIS — Z80.51 FAMILY HISTORY OF KIDNEY CANCER: ICD-10-CM

## 2021-03-01 PROCEDURE — 96040 HC GENETIC COUNSELING, EACH 30 MINUTES: CPT | Mod: GT | Performed by: GENETIC COUNSELOR, MS

## 2021-03-01 NOTE — LETTER
Cancer Risk Management  Program Locations    [facility]  [facility]  [facility]  [facility]  [facility]  Mailing Address  Cancer Risk Management Program  [address]  [place] [address]    New patient appointments  [phone]  March 10, 2021    Ruma Vega  [address] [facility] [address]      Dear Ruma,    It was a pleasure speaking with you over video for genetic counseling on 3/1/2021. Here is a copy of the progress note from our discussion. If you have any additional questions, please feel free to call.    Referring Provider: Pamela Valdivia PA-C    Presenting Information:   I spoke with Ruma Vega over video today for genetic counseling to discuss her family history of cancer. With her permission, this appointment was conducted over video due to COVID-19 precautions. We talked today to review this history, cancer screening recommendations, and available genetic testing options.    Personal History:  Ruma is a 40 year old female. She does not have any personal history of cancer.    She had her first menstrual period at age 10 or 11, her first child at age 37, and is premenopausal. Ruma has her ovaries, fallopian tubes and uterus in place. She reports that she has not used hormone replacement therapy. She has an IUD (Mirena) and used non-estrogen oral contraceptives in the past. She also used multiple different fertility medications (low dose, brief time) between 1556-3835. She has clinical breast exams and mammograms; her most recent mammogram on 1/28/21 was negative. She is planning for breast MRI in July. Ruma has not had a colonoscopy due to her age. Ruma reported no history of tobacco use and rare alcohol use.    Family History: (Please see scanned pedigree for detailed family history  information)  Maternal:  Her mother is 67 years old with no known history of cancer.   Her maternal aunt is 73 years old and was first diagnosed with breast cancer at age 51 and treated with lumpectomy and radiation. At age 70 she was diagnosed with 2 separate spots of cutaneous lymphoma, which were reportedly related to two tick bites (hairline, ear). Also at age 70 she was diagnosed with breast cancer (same breast and type as her first diagnosis, Ruma thinks most likely a recurrence). She had another lumpectomy and possibly radiation. At age 70 she was diagnosed with melanoma (nose) and had surgery and reconstruction. She underwent genetic testing first in 2008 (BRCA1 sequencing and 5-site rearrangement and BRCA2 sequencing, ii4b), which was negative. In March 2020 she underwent updated genetic testing through the [facility]. Testing was performed at [facility] using a 147 multi-gene cancer panel. No mutations were detected with this testing. Three variants of uncertain significance (VUS) were detected: EXT1 c.337G>A, GEN1 c.824G>A, and RECQL4 c.3532G>A. Her genetic testing results and genetic counseling results letter were sent to me for review today.  Her other aunt is 76 years old and was diagnosed with breast cancer at age 56. She had previously had an abnormal mammogram at age 46 and had a biopsy that was negative. She then developed her breast cancer in a spot right next to where this biopsy had been. Treatment included chemotherapy, radiation, and lumpectomy. She was then diagnosed with pancreatic cancer at age 69. She had a whipple procedure and chemotherapy. She has had 6 basal cell carcinomas, most recently one that was infiltrating. She has had a lot of sun exposure. She has previously declined genetic testing, but recently has indicated that she may be willing to test.  Her grandmother passed away at age 79. She had a history of abnormal mammograms but no known  cancer.  Her mother (Ruma's great grandmother) was diagnosed with breast cancer in her mid 70s. She later had a recurrence and passed away at age 83.  Paternal:  Her father is 70 years old with no known history of cancer. He has had colon polyps in the past.  Her grandmother was diagnosed with breast cancer at age 49. Treatment included bilateral mastectomy, chemotherapy, and radiation. She passed away at age 56 (non-cancer reason).  Her grandfather was diagnosed with kidney cancer at age 59. This was metastatic. Treatment included chemotherapy. He passed away at age 59. He had a history of heavy smoking.    Her maternal ethnicity is Cambodian, Panamanian, and [place]. Her paternal ethnicity is Bangladeshi. There is no known Ashkenazi Scientologist ancestry on either side of her family.     Discussion:    Ruma's family history of cancer is suggestive of a hereditary cancer syndrome.    We reviewed the features of sporadic, familial, and hereditary cancers. In looking at Ruma's family history, it is possible that a cancer susceptibility gene is present due to her maternal family history of breast cancer, pancreatic cancer, and melanoma.    We spent some time discussing the most informative approach to genetic testing. In families suspicious for a hereditary cancer syndrome, it is always most informative to begin testing with a family member who has a history of cancer. When we begin testing in someone who has not had cancer, a positive result (detected gene mutation) would be informative; however, a negative result (no gene mutation detected) would be uninformative. Uninformative results provide little new information about cancer risks. The most informative candidates for genetic testing in Ruma's family are her maternal aunts who have had cancers. As described above, one of her aunts has already undergone comprehensive genetic testing with no known mutations detected. Her other aunt is considering testing. We  discussed the option to wait until we have genetic test results available from both of her aunts for review or to proceed with testing for herself. She understands that if no mutations are detected in either of her aunts, then testing for Ruma's mother would not be indicated. If a mutation is found in her aunt, then her mother would have a 50% chance to have the same mutation. If her mother were found to have this mutation, then Ruma and her siblings would each have a 50% chance to have the same mutation. She stated that she understands the limitations of interpreting her own test results in the absence of having test results from both of her aunts first. She understands the importance of sharing additional genetic testing information from her family members with me when available to allow for more complete risk assessment. She stated that she would also like to proceed with testing for herself due to her paternal family history of cancers and smaller family with more limited information.    Her maternal aunt's genetic testing revealed three variants of uncertain significance. We reviewed that a variant of uncertain significance (VUS) is a variation in a gene, but it is unclear how this impacts cancer risk in the family. It may be a benign change that does not cause cancer risk, or it may be a mutation that causes an increased risk for cancer. We discussed that medical management for individuals is typically not changed on the basis of a VUS.     We discussed the natural history and genetics of hereditary cancer. A detailed handout regarding genes in which mutations are associated with an increased risk for breast and pancreatic cancer will be provided to Ruma via [employer] and can be found in the after visit summary. Topics included: inheritance pattern, cancer risks, cancer screening recommendations, and also risks, benefits and limitations of testing.    Based on her personal and family history, Ruma  meets current National Comprehensive Cancer Network (NCCN) criteria for genetic testing of high-penetrance breast and/or ovarian cancer susceptibility genes, which often includes BRCA1, BRCA2, CDH1, PALB2, PTEN, and TP53 among others.     We discussed that there are additional genes that could cause increased risk for breast and pancreatic cancer. As many of these genes present with overlapping features in a family and accurate cancer risk cannot always be established based upon the pedigree analysis alone, it would be reasonable for Ruma to consider panel genetic testing to analyze multiple genes at once.    We reviewed genetic testing options for Ruma based on her family history of cancers, including the option of a smaller panel of genes associated with certain cancers (for example breast and/or pancreatic) or larger panel options to include genes associated with multiple different types of cancer. Ruma expressed an interest in learning as much information as possible from the testing. We discussed expanded panel options including the [facility]ae Common Hereditary Cancers Panel or Inv[facility]ae Multi-Cancer panel. She opted to proceed with a testing panel similar to the one her aunt had at [facility]: an expanded Custom panel (150 genes associated with an increased risk for multiple different types of cancer).  We discussed that many genes on this panel are associated with specific hereditary cancer syndromes and have published management guidelines. Other genes have medical management guidelines available to screen for certain cancers. The remaining genes are associated with increased cancer risk and may allow us to make medical recommendations when mutations are identified. Some of the genes on this expanded panel may have limited information available about specific cancer risks and therefore, there may be limited screening guidelines available. We discussed that a number of the genes on this large panel are  "considered \"preliminary evidence\" genes and have limited information available. She stated that she understood potential limitations of a larger gene panel.     Due to COVID-19 precautions consent was obtained over video today. This is indicated on the consent form, which also includes my signature (as the provider who obtained consent). Genetic testing via an expanded Custom panel (150 genes associated with an increased risk for multiple different types of cancer) will be sent to [facility]. Ruma opted to have her blood drawn for testing and will schedule an appointment for this. Turnaround time from date when sample is received at the lab: approximately 3-4 weeks.    Medical Management: For Ruma, we reviewed that the information from genetic testing may determine:    additional cancer screening for which Ruma may qualify (i.e. mammogram and breast MRI, more frequent colonoscopies, more frequent dermatologic exams, etc.),    options for risk reducing surgeries Ruma could consider (i.e. bilateral mastectomy, surgery to remove her ovaries and/or uterus, etc.),      and targeted chemotherapies if she were to develop certain cancers in the future (i.e. immunotherapy for individuals with Alfredo syndrome, PARP inhibitors, etc.).     These recommendations will be discussed in detail once genetic testing is completed.     Plan:  1) Today Ruma elected to proceed with genetic testing via an expanded Custom panel (150 genes associated with an increased risk for multiple different types of cancer) offered by [facility].  2) This information should be available in 4-5 weeks.  3) Ruma will return to clinic to discuss the results.    Tamara Esparza MS, [facility] [place] [facility]  Licensed, Certified Genetic Counselor  Office: [phone]  Email: [email or web address]                                                                    Assessing Cancer Risk  Only about 5-10% of cancers are thought to be due to an inherited cancer " susceptibility gene.    These families often have:    Several people with the same or related types of cancer    Cancers diagnosed at a young age (before age 50)    Individuals with more than one primary cancer    Multiple generations of the family affected with cancer    Some people may be candidates for genetic testing of more than one gene.  For these families, genetic testing using a cancer panel may be offered.  These panels will test different genes known to increase the risk for breast, ovarian, uterine, and/or other cancers. All of the genes discussed below have published clinical management guidelines for individuals who are found to carry a mutation. The purpose of this handout is to serve as a brief summary of the genes analyzed by the panels used to inquire about hereditary breast and gynecologic cancer:  GIBSON, BRCA1, BRCA2, BRIP1, CDH1, CHEK2, MLH1, MSH2, MSH6, PMS2, EPCAM, PTEN, PALB2, RAD51C, RAD51D, and TP53.  ______________________________________________________________________________  Hereditary Breast and Ovarian Cancer Syndrome   (BRCA1 and BRCA2)  A single mutation in one of the copies of BRCA1 or BRCA2 increases the risk for breast and ovarian cancer, among others.  The risk for pancreatic cancer and melanoma may also be slightly increased in some families.  The chart below shows the chance that someone with a BRCA mutation would develop cancer in his or her lifetime1,2,3,4.        A person s ethnic background is also important to consider, as individuals of Ashkenazi Bahai ancestry have a higher chance of having a BRCA gene mutation.  There are three BRCA mutations that occur more frequently in this population.    Alfredo Syndrome   (MLH1, MSH2, MSH6, PMS2, and EPCAM)  Currently five genes are known to cause Alfredo Syndrome: MLH1, MSH2, MSH6, PMS2, and EPCAM.  A single mutation in one of the Alfredo Syndrome genes increases the risk for colon, endometrial, ovarian, and stomach cancers.  Other  cancers that occur less commonly in Alfredo Syndrome include urinary tract, skin, and brain cancers.  The chart below shows the chance that a person with Alfredo syndrome would develop cancer in his or her lifetime5.      *Cancer risk varies depending on Alfredo syndrome gene found    Cowden Syndrome   (PTEN)  Cowden syndrome is a hereditary condition that increases the risk for breast, thyroid, endometrial, colon, and kidney cancer.  Cowden syndrome is caused by a mutation in the PTEN gene.  A single mutation in one of the copies of PTEN causes Cowden syndrome and increases cancer risk.  The chart below shows the chance that someone with a PTEN mutation would develop cancer in their lifetime6,7.  Other benign features seen in some individuals with Cowden syndrome include benign skin lesions (facial papules, keratoses, lipomas), learning disability, autism, thyroid nodules, colon polyps, and larger head size.      *One recent study found breast cancer risk to be increased to 85%    Li-Fraumeni Syndrome   (TP53)  Li-Fraumeni Syndrome (LFS) is a cancer predisposition syndrome caused by a mutation in the TP53 gene. A single mutation in one of the copies of TP53 increases the risk for multiple cancers. Individuals with LFS are at an increased risk for developing cancer at a young age. The lifetime risk for development of a LFS-associated cancer is 50% by age 30 and 90% by age 60.   Core Cancers: Sarcomas, Breast, Brain, Lung, Leukemias/Lymphomas, Adrenocortical carcinomas  Other Cancers: Gastrointestinal, Thyroid, Skin, Genitourinary    Hereditary Diffuse Gastric Cancer   (CDH1)  Currently, one gene is known to cause hereditary diffuse gastric cancer (HDGC): CDH1.  Individuals with HDGC are at increased risk for diffuse gastric cancer and lobular breast cancer. Of people diagnosed with HDGC, 30-50% have a mutation in the CDH1 gene.  This suggests there are likely other genes that may cause HDGC that have not been identified  yet.      Lifetime Cancer Risks    General Population GC    Diffuse Gastric  <1% ~80%   Breast 12% 39-52%         Additional Genes  GIBSON  GIBSON is a moderate-risk breast cancer gene. Women who have a mutation in GIBSON can have between a 2-4 fold increased risk for breast cancer compared to the general population8. GIBSON mutations have also been associated with increased risk for pancreatic cancer, however an estimate of this cancer risk is not well understood9. Individuals who inherit two GIBSON mutations have a condition called ataxia-telangiectasia (AT).  This rare autosomal recessive condition affects the nervous system and immune system, and is associated with progressive cerebellar ataxia beginning in childhood.  Individuals with ataxia-telangiectasia often have a weakened immune system and have an increased risk for childhood cancers.    PALB2  Mutations in PALB2 have been shown to increase the risk of breast cancer up to 33-58% in some families; where individuals fall within this risk range is dependent upon family [id number]. PALB2 mutations have also been associated with increased risk for pancreatic cancer, although this risk has not been quantified yet.  Individuals who inherit two PALB2 mutations--one from their mother and one from their father--have a condition called Fanconi Anemia.  This rare autosomal recessive condition is associated with short stature, developmental delay, bone marrow failure, and increased risk for childhood cancers.    CHEK2   CHEK2 is a moderate-risk breast cancer gene.  Women who have a mutation in CHEK2 have around a 2-fold increased risk for breast cancer compared to the general population, and this risk may be higher depending upon family history.11,12,13 Mutations in CHEK2 have also been shown to increase the risk of a number of other cancers, including colon and prostate, however these cancer risks are currently not well understood.    BRIP1, RAD51C and RAD51D  Mutations in BRIP1,  RAD51C, and RAD51D have been shown to increase the risk of ovarian cancer and possibly female breast cancer as well14,15 .       Lifetime Cancer Risk    General Population BRIP1 RAD51C RAD51D   Ovarian 1-2% ~5-8% ~5-9% ~7-15%           Inheritance  All of the cancer syndromes reviewed above are inherited in an autosomal dominant pattern.  This means that if a parent has a mutation, each of his or her children will have a 50% chance of inheriting that same mutation.  Therefore, each child--male or female--would have a 50% chance of being at increased risk for developing cancer.      Image obtained from Genetics Home Reference, 2013     Mutations in some genes can occur de ranjith, which means that a person s mutation occurred for the first time in them and was not inherited from a parent.  Now that they have the mutation, however, it can be passed on to future generations.    Genetic Testing  Genetic testing involves a blood test and will look at the genetic information in the GIBSON, BRCA1, BRCA2, BRIP1, CDH1, CHEK2, MLH1, MSH2, MSH6, PMS2, EPCAM, PTEN, PALB2, RAD51C, RAD51D, and TP53 genes for any harmful mutations that are associated with increased cancer risk.  If possible, it is recommended that the person(s) who has had cancer be tested before other family members.  That person will give us the most useful information about whether or not a specific gene is associated with the cancer in the family.    Results  There are three possible results of genetic testing:    Positive--a harmful mutation was identified in one or more of the genes    Negative--no mutation was identified in any of the genes on this panel    Variant of unknown significance--a variation in one of the genes was identified, but it is unclear how this impacts cancer risk in the family    Advantages and Disadvantages   There are advantages and disadvantages to genetic testing.    Advantages    May clarify your cancer risk    Can help you make medical  decisions    May explain the cancers in your family    May give useful information to your family members (if you share your results)    Disadvantages    Possible negative emotional impact of learning about inherited cancer risk    Uncertainty in interpreting a negative test result in some situations    Possible genetic discrimination concerns (see below)    Genetic Information Nondiscrimination Act (SAROJ)  SAROJ is a federal law that protects individuals from health insurance or employment discrimination based on a genetic test result alone.  Although rare, there are currently no legal discrimination protections in terms of life insurance, long term care, or disability insurances.  Visit the [employer] [place] website to learn more.    Reducing Cancer Risk  All of the genes described above have nationally recognized cancer screening guidelines that would be recommended for individuals who test positive.  In addition to increased cancer screening, surgeries may be offered or recommended to reduce cancer risk.  Recommendations are based upon an individual s genetic test result as well as their personal and family history of cancer.    Questions to Think About Regarding Genetic Testing:    What effect will the test result have on me and my relationship with my family members if I have an inherited gene mutation?  If I don t have a gene mutation?    Should I share my test results, and how will my family react to this news, which may also affect them?    Are my children ready to learn new information that may one day affect their own health?    Hereditary Cancer Resources    FORCE: Facing Our Risk of Cancer Empowered [email or web address]   Bright Pink bebrightpink.org   Li-Fraumeni Syndrome Association lfsassociation.org   PTEN World PTENworld.com   No stomach for cancer, Inc. nostomachforcancer.org   Stomach cancer relief network Scrnet.org   Collaborative Group of the Americas on Inherited Colorectal  Cancer (CGA) cgaicc.com    Cancer Care cancercare.org   American Cancer Society (ACS) cancer.org   National Cancer [place] (NCI) cancer.gov     Please call us if you have any questions or concerns.   Cancer Risk Management Program [phone] ([phone])  ? Pedro Arriaga, MS, [facility] [phone]  ? Breanne Romeo, MS, [facility]  [phone]  ? Yuliana Solares, MS, [facility]  [phone]  ? Charity Cordero, MS, [facility] [phone]  ? Evie Abraham, MS, [facility] [phone]  ? Tamara Esparza, MS, [facility]  [phone]    References  1. Jaz MARTINEZ, Frank PDP, Michael S, Kervin GOODEN, Sydney JE, Trevor JL, Misael N, Jordi H, Suzi O, Kacie A, Bigg B, Gonzalo P, Lori S, Pearl DM, Benitez N, Luis E E, Meghana H, Otis E, Levy J, Gronbjorn J, Mamta B, Tulinius H, Thorlacius S, Eerola H, Jose H, Jesus K, Alfredo OP. Average risks of breast and ovarian cancer associated with BRCA1 or BRCA2 mutations detected in case series unselected for family history: a combined analysis of 222 studies. Am J Hum Bryanna. 2003;72:1117-30.  2. Staci N, Bharti M, Jessica G.  BRCA1 and BRCA2 Hereditary Breast and Ovarian Cancer. Gene Reviews online. 2013.  3. Bernabe YC, Stefan S, Leilani G, Randolph S. Breast cancer risk among male BRCA1 and BRCA2 mutation carriers. J Natl Cancer Inst. 2007;99:1811-4.  4. Juan Francisco VENTURA, Kerry I, Elkin J, Vicki E, Mauricio ER, Allie F. Risk of breast cancer in male BRCA2 carriers. J Med Bryanna. 2010;47:710-1.  5. National Comprehensive Cancer Network. Clinical practice guidelines in oncology, colorectal cancer screening. Available online (registration required). 2015.  6. Osmar NGUYỄN, Nan J, Ross J, Della LA, Hanny MS, Eng C. Lifetime cancer risks in individuals with germline PTEN mutations. Clin Cancer Res. 2012;18:400-7.  7. Alyson ALBA. Cowden Syndrome: A Critical Review of the Clinical Literature. J Bryanna . 2009:18:13-27.  8. Joanna MARTINEZ, Siva D, Ligia S, Jennifer P, Susan T, Kassie M, Tyson TORRES,  Heriberto H, Matthew R, Soo K, Magno L, Juan Francisco DG, Pearl D, Giuliano DF, Kuldip MR, The Breast Cancer Susceptibility Collaboration () & Lj HERNÁNDEZ. GIBSON mutations that cause ataxia-telangiectasia are breast cancer susceptibility alleles. Nature Genetics. 2006;38:873-875  9. German N , Joseph Y, Mary J, Adriana L, Jorge GM , Donnie ML, Gallinger S, Chavez AG, Syngal S, Ashly ML, Daria J , Kavitha R, Carina SZ, Sofia JR, Kiko VE, Britton M, Vogelstein B, Cathie N, Tariq RH, Shira KW, and Dominga AP. GIBSON mutations in patients with hereditary pancreatic cancer. Cancer Discover. 2012;2:41-46  10. Jaz JAVIER et al. Breast-Cancer Risk in Families with Mutations in PALB2. NEJM. 2014; 371(6):497-506.  11. CHEK2 Breast Cancer Case-Control Consortium. CHEK2*1100delC and susceptibility to breast cancer: A collaborative analysis involving 10,860 breast cancer cases and 9,065 controls from 10 studies. Am J Hum [place], 74 (2004), pp. 6090-1689  12. Romel T, Michele S, Hossein K, et al. Spectrum of Mutations in BRCA1, BRCA2, CHEK2, and TP53 in Families at High Risk of Breast Cancer. PILY. 2006;295(12):1578-8751.   13. Pancho C, Eden D, Angela A, et al. Risk of breast cancer in women with a CHEK2 mutation with and without a family history of breast cancer. J Clin Oncol. 2011;29:6429-8550.  14. Mesfin H, Tylor E, Ram SJ, et al. Contribution of germline mutations in the RAD51B, RAD51C, and RAD51D genes to ovarian cancer in the population. J Clin Oncol. 2015;33(26):0172-1864. Doi:10.1200/JCO.2015.61.2408.  15. Gini T, Lexus GALLEGOS, Pennie P, et al. Mutations in BRIP1 confer high risk of ovarian cancer. Bharati Bryanna. 2011;43(11):7105-6702. doi:10.1038/ng.955.        Assessing Cancer Risk  Only about 5-10% of cancers are thought to be due to an inherited cancer susceptibility gene. These families often have:    Several people with the same or related types of cancer    Cancers diagnosed at a  young age (before age 50)    Individuals with more than one primary cancer    Multiple generations of the family affected with cancer    Some people may be candidates for genetic testing of more than one gene.  For these families, genetic testing using a cancer panel may be offered.  These panels can test many genes at once known to increase the risk for pancreatic (and other) cancers: APC, GIBSON, BRCA1, BRCA2, CDKN2A, EPCAM, MLH1, MSH2, MSH6, PALB2, PMS2, STK11, and TP53. The purpose of this handout is to serve as a brief summary of the pancreatic cancer risk genes and cancer syndrome with pancreatic cancer risks.     Hereditary Breast and Ovarian Cancer Syndrome  (BRCA1 and BRCA2)    A single mutation in one of the copies of BRCA1 or BRCA2 increases the risk for breast and ovarian cancer.  The risk for pancreatic cancer and melanoma may be slightly increased in some families. BRCA2 is considered the most common gene responsible for familial pancreatic cancer.    A person s ethnic background is also important to consider, as individuals of Ashkenazi Adventist ancestry have a higher chance of having a BRCA gene mutation.  There are three BRCA mutations that occur more frequently in this population.    Alfredo Syndrome  (MLH1, MSH2, MSH6, PMS2, and EPCAM)    Currently five genes are known to cause Alfredo Syndrome: MLH1, MSH2, MSH6, PMS2, and EPCAM.  A single mutation in one of the Alfredo Syndrome genes increases the risk for colon, endometrial, ovarian, and stomach cancers.  Other cancers that occur less commonly in Alfredo Syndrome include urinary tract cancers, brain cancers, and other cancers.  People who have Alfredo Syndrome have up to a 6% risk of pancreatic cancer.     *Cancer risk varies depending on Alfredo syndrome gene found    Familial Atypical Multiple Mole Melanoma Syndrome  (CDKN2A)    Familial Atypical Multiple Mole Melanoma Syndrome (FAMMM) is caused by a single mutation in the CDKN2A gene. This gene used to be  called p16. The lifetime risk for melanoma is between 58-92%5. People with FAMMM have increased risks for pancreatic cancer, and possibly other cancers.      People with FAMMM typically have many moles (more than 50), which can be atypical.The exact risk of pancreatic cancer can depend on a person s specific CDKN2A mutation. The risk for pancreatic cancer be as high as 60% depending on the mutation.    Peutz-Jeghers Syndrome  (STK11)    Peutz-Jeghers Syndrome is caused by a mutation in the STK11 gene. The main features of Peutz-Jeghers Syndrome are multiple hamartomatous colon polyps and blue pigmentation of the lips and oral mucosa. Cancers associated with Peutz-Jeghers Syndrome include: gastrointestinal, gynecological, lung, breast, and pancreatic cancer. Up to a 36% lifetime risk of developing pancreatic cancer has been reported for those with Peutz-Jeghers syndrome.     Li-Fraumeni Syndrome  (TP53)    Li-Fraumeni Syndrome (LFS) is a cancer predisposition syndrome caused by a mutation in the TP53 gene. A single mutation in one of the copies of TP53 increases the risk for multiple cancers. Individuals with LFS are at an increased risk for developing cancer at a young age. The lifetime risk for development of a LFS-associated cancer is 50% by age 30 and 90% by age 60.      Core Cancers: Sarcomas, Breast, Brain, Lung, Leukemias/Lymphomas, Adrenocortical carcinomas    Other Cancers: Gastrointestinal (including pancreatic), Thyroid, Skin, Genitourinary    Familial Adenomatous Polyposis Syndrome  (APC)    Familial Adenomatous Polyposis syndrome (FAP) is caused by a single mutation in the APC gene and is characterize by having over 100 adenomatous polyps in the colon and significantly increased risk for colon cancer. These typically appear during adolescence. FAP is associated with other tumors in the thyroid, stomach, and duodenum. People with FAP have an increased lifetime pancreatic cancer risk over the general  population.    Additional Genes  PALB2  Mutations in the PALB2 gene have been shown to increase the risk of breast cancer up to 58% in some families. PALB2 mutations have also been associated with increased risk for pancreatic cancer.  Individuals who inherit two PALB2 mutations--one from their mother and one from their father--have a condition called Fanconi Anemia.  This condition is associated with short stature, developmental delay, bone marrow failure, and increased risk for childhood cancers.    GIBSON  Mutations in the GIBSON gene typically increase the risk of breast cancer 2-4 times higher than an average woman. GIBSON mutations have also been associated with an increased risk of pancreatic cancer. People who inherit an GIBSON mutation from both their mother and father have a condition called ataxia-telangiectasia. Ataxia telangiectasia is associated with ataxia in childhood (trouble with balance and walking), telangiectasias (red or purple spotty clusters on the skin), involuntary movements, frequent infections, and increased risk of leukemia and lymphoma.     Inheritance    All of the genes reviewed above are inherited in an autosomal dominant pattern.  This means that if a parent has a mutation, each of their children will have a 50% chance of inheriting that same mutation.  Every child--male or female--would have a 50% chance of inheriting the mutation and being at increased risk for developing cancer.       https://r.nlm.nih.gov/primer/inheritance/inheritancepatterns    Mutations in some genes can occur de ranjith, which means that a person s mutation occurred for the first time in them and was not inherited from a parent.  Now that they have the mutation, however, it can be passed on to future generations.     Genetic Testing  Genetic testing involves a blood test and will look for any harmful mutations that are associated with increased cancer risk.  If possible, it is recommended that the person(s) who has had  cancer be tested before other family members.  That person will give us the most useful information about whether or not a specific gene is associated with the cancer in the family.    Advantages and Disadvantages   There are advantages and disadvantages to genetic testing.  Advantages    May clarify your cancer risk and additional appropriate cancer screenings     Can help you make medical decisions    May explain the cancers in your family    May give useful information to your family members (if you share your results)     Disadvantages    Possible negative emotional impact of learning about inherited cancer risk    Uncertainty in interpreting a negative test result in some situations    Possible genetic discrimination concerns (see below)     Genetic Information Nondiscrimination Act (SAROJ)  SAROJ is a federal law that protects individuals from health insurance or employment discrimination based on a genetic test result.  There are currently no legal discrimination protections in terms of life insurance, long term care, or disability insurances.  Visit the [employer] [place] website to learn more: https://www.genome.gov/98036790/genetic-discrimination/    Questions to Think About Regarding Genetic Testing:    What effect will the test result have on me and my relationship with my family members if I have an inherited gene mutation?  If I don t have a gene mutation?    Should I share my test results, and how will my family react to this news, which may also affect them?    Are my children ready to learn new information that may one day affect their own health?    There are three possible results of genetic testing:    Positive--a harmful mutation was identified in one or more of the genes    Negative--no mutation was identified in any of the genes on this panel    Variant of unknown significance (VUS)--a variation in one of the genes was identified, but it is unclear how this impacts cancer  risk in the family  o Families with VUS results can contact their genetic counselor annually for updates     Reducing Cancer Risk  Recommendations are based upon an individual s genetic test result as well as their personal and family history of cancer. Pancreatic cancer screening may be available based on family history. Talk with your physician about screening options.     Cancer Resources      National Pancreatic Cancer Foundation: npcf.     Pancreatic Cancer Action Network: pancan.org     FORCE: Facing Our Risk of Cancer Empowered: facingourrisk.org    [place]right [place]: bebrightpink.org    Li-Fraumeni Syndrome Association: lfsassociation.org    Collaborative Group of the [place] on Inherited Colorectal Cancer (CGA)   o cgaicc.[employer] http://www.facingourrisk.org/    Cancer Care: cancercare.org    American Cancer Society (ACS): cancer.org    National Cancer [place] (NCI): cancer.gov      Please call us if you have any questions or concerns.   Cancer Risk Management Program [phone] ([phone])  ? Pedro Arriaga, MS, [facility] [phone]  ? Breanne Romeo, MS, [facility]  [phone]  ? Yuliana Solares, MS, [facility]  [phone]  ? Charity Cordero, MS, [facility] [phone]  ? Evie Abrhaam, MS, [facility] [phone]   ? Tamara Esparza, MS, [facility]  [phone]    References  1. Genetic / Familial High-Risk Assessment?: Breast and Ovarian. NCCN Pract [facility] Oncol. 2017;1.2018.  2. Fatuma J, Laury A, Levy J, et al. The incidence of pancreatic cancer in BRCA1 and BRCA2 mutation carriers. Br J Cancer. 2012;107(12):9935-2707. doi:10.1038/bjc.2012.483.  3. Pietro DB, Ed KG, Pravin S, et al. BRCA1, BRCA2, PALB2, and CDKN2A mutations in familial pancreatic cancer: a PACGENE study. [place] Med. 2015;17(7):569-577. doi:10.1038/gim.2014.153.  4. Cem CRAMER. Genetic / Familial High-Risk Assessment?: Colorectal. NCCN Pract Guidel Oncol. 2017;2.2017.  5. Addy BLACK, Bishop COOK, Anastasiya LOO, Gabriel MUELLER. Familial Atypical Multiple Mole Melanoma Syndrome.  National Center for Biotechnology Information (); 2009. http://www.ncbi.nlm.nih.gov/pubmed/45653469. Accessed October 10, 2017.  6. Alfredo HT, Fusbella RM, Anaer J, et al. Tumour spectrum in the FAMMM syndrome. Br J Cancer. 1981;44(4):553-560. http://www.ncbi.nlm.nih.gov/pubmed/5538126. Accessed October 10, 2017.  7. Maribel MEDINA, [place] J, Grupo A, et al. 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 Gastroenterology. 2000;119(6):3636-5917. doi:10.1053/GIANNI.2000.53345.  8. Maribel LEWIS, Offerhaus GJ, Joselito DH, et al. Increased risk of thyroid and pancreatic carcinoma in familial adenomatous polyposis. Gut. 1993;34(10):9486-5062. doi:10.1136/GUT.34.10.1394.

## 2021-03-08 DIAGNOSIS — Z80.0 FAMILY HISTORY OF PANCREATIC CANCER: ICD-10-CM

## 2021-03-08 DIAGNOSIS — Z80.3 FAMILY HISTORY OF MALIGNANT NEOPLASM OF BREAST: ICD-10-CM

## 2021-03-08 DIAGNOSIS — Z80.51 FAMILY HISTORY OF KIDNEY CANCER: ICD-10-CM

## 2021-03-08 DIAGNOSIS — Z80.8 FAMILY HISTORY OF MELANOMA: ICD-10-CM

## 2021-03-08 LAB — MISCELLANEOUS TEST: NORMAL

## 2021-03-10 NOTE — PATIENT INSTRUCTIONS
Assessing Cancer Risk  Only about 5-10% of cancers are thought to be due to an inherited cancer susceptibility gene.    These families often have:    Several people with the same or related types of cancer    Cancers diagnosed at a young age (before age 50)    Individuals with more than one primary cancer    Multiple generations of the family affected with cancer    Some people may be candidates for genetic testing of more than one gene.  For these families, genetic testing using a cancer panel may be offered.  These panels will test different genes known to increase the risk for breast, ovarian, uterine, and/or other cancers. All of the genes discussed below have published clinical management guidelines for individuals who are found to carry a mutation. The purpose of this handout is to serve as a brief summary of the genes analyzed by the panels used to inquire about hereditary breast and gynecologic cancer:  GIBSON, BRCA1, BRCA2, BRIP1, CDH1, CHEK2, MLH1, MSH2, MSH6, PMS2, EPCAM, PTEN, PALB2, RAD51C, RAD51D, and TP53.  ______________________________________________________________________________  Hereditary Breast and Ovarian Cancer Syndrome   (BRCA1 and BRCA2)  A single mutation in one of the copies of BRCA1 or BRCA2 increases the risk for breast and ovarian cancer, among others.  The risk for pancreatic cancer and melanoma may also be slightly increased in some families.  The chart below shows the chance that someone with a BRCA mutation would develop cancer in his or her lifetime1,2,3,4.        A person s ethnic background is also important to consider, as individuals of Ashkenazi [employer] ancestry have a higher chance of having a BRCA gene mutation.  There are three BRCA mutations that occur more frequently in this population.    Alfredo Syndrome   (MLH1, MSH2, MSH6, PMS2, and EPCAM)  Currently five genes are known to cause Alfredo Syndrome: MLH1, MSH2, MSH6, PMS2, and EPCAM.  A single mutation in one of the  Alfredo Syndrome genes increases the risk for colon, endometrial, ovarian, and stomach cancers.  Other cancers that occur less commonly in Alfredo Syndrome include urinary tract, skin, and brain cancers.  The chart below shows the chance that a person with Alfredo syndrome would develop cancer in his or her lifetime5.      *Cancer risk varies depending on Alfredo syndrome gene found    Cowden Syndrome   (PTEN)  Cowden syndrome is a hereditary condition that increases the risk for breast, thyroid, endometrial, colon, and kidney cancer.  Cowden syndrome is caused by a mutation in the PTEN gene.  A single mutation in one of the copies of PTEN causes Cowden syndrome and increases cancer risk.  The chart below shows the chance that someone with a PTEN mutation would develop cancer in their lifetime6,7.  Other benign features seen in some individuals with Cowden syndrome include benign skin lesions (facial papules, keratoses, lipomas), learning disability, autism, thyroid nodules, colon polyps, and larger head size.      *One recent study found breast cancer risk to be increased to 85%    Li-Fraumeni Syndrome   (TP53)  Li-Fraumeni Syndrome (LFS) is a cancer predisposition syndrome caused by a mutation in the TP53 gene. A single mutation in one of the copies of TP53 increases the risk for multiple cancers. Individuals with LFS are at an increased risk for developing cancer at a young age. The lifetime risk for development of a LFS-associated cancer is 50% by age 30 and 90% by age 60.   Core Cancers: Sarcomas, Breast, Brain, Lung, Leukemias/Lymphomas, Adrenocortical carcinomas  Other Cancers: Gastrointestinal, Thyroid, Skin, Genitourinary    Hereditary Diffuse Gastric Cancer   (CDH1)  Currently, one gene is known to cause hereditary diffuse gastric cancer (HDGC): CDH1.  Individuals with HDGC are at increased risk for diffuse gastric cancer and lobular breast cancer. Of people diagnosed with HDGC, 30-50% have a mutation in the CDH1  gene.  This suggests there are likely other genes that may cause HDGC that have not been identified yet.      Lifetime Cancer Risks    General Population HDGC    Diffuse Gastric  <1% ~80%   Breast 12% 39-52%         Additional Genes  GIBSON  GIBSON is a moderate-risk breast cancer gene. Women who have a mutation in GIBSON can have between a 2-4 fold increased risk for breast cancer compared to the general population8. GIBSON mutations have also been associated with increased risk for pancreatic cancer, however an estimate of this cancer risk is not well understood9. Individuals who inherit two GIBSON mutations have a condition called ataxia-telangiectasia (AT).  This rare autosomal recessive condition affects the nervous system and immune system, and is associated with progressive cerebellar ataxia beginning in childhood.  Individuals with ataxia-telangiectasia often have a weakened immune system and have an increased risk for childhood cancers.    PALB2  Mutations in PALB2 have been shown to increase the risk of breast cancer up to 33-58% in some families; where individuals fall within this risk range is dependent upon family fzroxiy47. PALB2 mutations have also been associated with increased risk for pancreatic cancer, although this risk has not been quantified yet.  Individuals who inherit two PALB2 mutations--one from their mother and one from their father--have a condition called Fanconi Anemia.  This rare autosomal recessive condition is associated with short stature, developmental delay, bone marrow failure, and increased risk for childhood cancers.    CHEK2   CHEK2 is a moderate-risk breast cancer gene.  Women who have a mutation in CHEK2 have around a 2-fold increased risk for breast cancer compared to the general population, and this risk may be higher depending upon family history.11,12,13 Mutations in CHEK2 have also been shown to increase the risk of a number of other cancers, including colon and prostate, however  these cancer risks are currently not well understood.    BRIP1, RAD51C and RAD51D  Mutations in BRIP1, RAD51C, and RAD51D have been shown to increase the risk of ovarian cancer and possibly female breast cancer as well14,15 .       Lifetime Cancer Risk    General Population BRIP1 RAD51C RAD51D   Ovarian 1-2% ~5-8% ~5-9% ~7-15%           Inheritance  All of the cancer syndromes reviewed above are inherited in an autosomal dominant pattern.  This means that if a parent has a mutation, each of his or her children will have a 50% chance of inheriting that same mutation.  Therefore, each child--male or female--would have a 50% chance of being at increased risk for developing cancer.      Image obtained from Genetics Home Reference, 2013     Mutations in some genes can occur de ranjith, which means that a person s mutation occurred for the first time in them and was not inherited from a parent.  Now that they have the mutation, however, it can be passed on to future generations.    Genetic Testing  Genetic testing involves a blood test and will look at the genetic information in the GIBSON, BRCA1, BRCA2, BRIP1, CDH1, CHEK2, MLH1, MSH2, MSH6, PMS2, EPCAM, PTEN, PALB2, RAD51C, RAD51D, and TP53 genes for any harmful mutations that are associated with increased cancer risk.  If possible, it is recommended that the person(s) who has had cancer be tested before other family members.  That person will give us the most useful information about whether or not a specific gene is associated with the cancer in the family.    Results  There are three possible results of genetic testing:    Positive--a harmful mutation was identified in one or more of the genes    Negative--no mutation was identified in any of the genes on this panel    Variant of unknown significance--a variation in one of the genes was identified, but it is unclear how this impacts cancer risk in the family    Advantages and Disadvantages   There are advantages and  disadvantages to genetic testing.    Advantages    May clarify your cancer risk    Can help you make medical decisions    May explain the cancers in your family    May give useful information to your family members (if you share your results)    Disadvantages    Possible negative emotional impact of learning about inherited cancer risk    Uncertainty in interpreting a negative test result in some situations    Possible genetic discrimination concerns (see below)    Genetic Information Nondiscrimination Act (SAROJ)  SAROJ is a federal law that protects individuals from health insurance or employment discrimination based on a genetic test result alone.  Although rare, there are currently no legal discrimination protections in terms of life insurance, long term care, or disability insurances.  Visit the [employer] website to learn more.    Reducing Cancer Risk  All of the genes described above have nationally recognized cancer screening guidelines that would be recommended for individuals who test positive.  In addition to increased cancer screening, surgeries may be offered or recommended to reduce cancer risk.  Recommendations are based upon an individual s genetic test result as well as their personal and family history of cancer.    Questions to Think About Regarding Genetic Testing:    What effect will the test result have on me and my relationship with my family members if I have an inherited gene mutation?  If I don t have a gene mutation?    Should I share my test results, and how will my family react to this news, which may also affect them?    Are my children ready to learn new information that may one day affect their own health?    Hereditary Cancer Resources    FORCE: Facing Our Risk of Cancer Empowered facingourrisk.org   Bright Pink bebrightpink.org   Li-Fraumeni Syndrome Association lfsassociation.org   PTEN World PTENworld.com   No stomach for cancer, Inc.  nostomachforcancer.org   Stomach cancer relief network Scrnet.org   Collaborative Group of the Americas on Inherited Colorectal Cancer (CGA) cgaicc.com    Cancer Care cancercare.org   American Cancer Society (ACS) cancer.org   National Cancer [place] (NCI) cancer.gov     Please call us if you have any questions or concerns.   Cancer Risk Management Program [phone] ([phone])  ? Pedro Arriaga, MS, [facility] [phone]  ? Breanne Romeo, MS, [facility]  [phone]  ? Yuliana Solares, MS, [facility]  [phone]  ? Charity Cordero, MS, [facility] [phone]  ? Evie Leigh, MS, [facility] [phone]  ? Tamara Esparza, MS, [facility]  [phone]    References  1. Jaz A, Frank PDP, Michael S, Kervin GOOEDN, Sydney JE, Trevor JL, Misael N, Jordi H, Suzi O, Kacie A, Bigg B, Gonzalo P, Manjocelyn S, Pearl DM, Marquis N, Luis E E, Meghana H, Otis E, Levy J, Gronbjorn J, Mamta B, Binu H, Thorlacius S, Eerola H, Jose H, Jesus K, Alfredo OP. Average risks of breast and ovarian cancer associated with BRCA1 or BRCA2 mutations detected in case series unselected for family history: a combined analysis of 222 studies. Am J Hum Bryanna. 2003;72:1117-30.  2. Staci HERNÁNDEZ, Bharti M, Jessica G.  BRCA1 and BRCA2 Hereditary Breast and Ovarian Cancer. Gene Reviews online. 2013.  3. Bernabe YC, Stefan S, Leilani G, Randolph S. Breast cancer risk among male BRCA1 and BRCA2 mutation carriers. J Natl Cancer Inst. 2007;99:1811-4.  4. Juan Francisco VENTURA, Kerry I, Elkin J, Vicki E, Mauricio ER, Allie F. Risk of breast cancer in male BRCA2 carriers. J Med Bryanna. 2010;47:710-1.  5. National Comprehensive Cancer Network. Clinical practice guidelines in oncology, colorectal cancer screening. Available online (registration required). 2015.  6. Osmar NGUYỄN, Nan J, Ross J, Della LA, Hanny AYALA, Tano C. Lifetime cancer risks in individuals with germline PTEN mutations. Clin Cancer Res. 2012;18:400-7.  7. Alyson ALBA. Cowden Syndrome: A Critical Review of the  Clinical Literature. J Bryanna . 2009:18:13-27.  8. Joanna A, Siva D, Ligia S, Jennifer P, Susan T, Kassie M, Tyson B, Heriberto H, Matthew R, Soo K, Magno L, Juan Francisco DG, Pearl D, Giuliano DF, Kuldip MR, The Breast Cancer Susceptibility Collaboration () & Lj HERNÁNDEZ. GIBSON mutations that cause ataxia-telangiectasia are breast cancer susceptibility alleles. Nature Genetics. 2006;38:873-875  9. German N , Joseph Y, Mary J, Adriana L, Jorge GM , Donnie ML, Gallinger S, Chavez AG, Syngal S, Ashly ML, Daria J , Kavitha R, Carina SZ, Sofia JR, Kiko VE, Britton M, Vosulma B, Cathie N, Tariq RH, Shira KW, and Dominga AP. GIBSON mutations in patients with hereditary pancreatic cancer. Cancer Discover. 2012;2:41-46  10. Jaz JAVIER, et al. Breast-Cancer Risk in Families with Mutations in PALB2. NEJM. 2014; 371(6):497-506.  11. CHEK2 Breast Cancer Case-Control Consortium. CHEK2*1100delC and susceptibility to breast cancer: A collaborative analysis involving 10,860 breast cancer cases and 9,065 controls from 10 studies. Am J Hum Bryanna, 74 (2004), pp. 1156-5825  12. Romel T, Michele S, Hossein K, et al. Spectrum of Mutations in BRCA1, BRCA2, CHEK2, and TP53 in Families at High Risk of Breast Cancer. PILY. 2006;295(12):6315-0099.   13. Pancho C, Eden D, Angela A, et al. Risk of breast cancer in women with a CHEK2 mutation with and without a family history of breast cancer. J Clin Oncol. 2011;29:8136-0646.  14. Mesfin H, Tylor E, Myra SJ, et al. Contribution of germline mutations in the RAD51B, RAD51C, and RAD51D genes to ovarian cancer in the population. J Clin Oncol. 2015;33(26):9890-1706. Doi:10.1200/JCO.2015.61.2408.  15. Gini T, Lexus GALLEGOS, Pennie P, et al. Mutations in BRIP1 confer high risk of ovarian cancer. Bharati Bryanna. 2011;43(11):2373-3208. doi:10.1038/ng.955.      Assessing Cancer Risk  Only about 5-10% of cancers are thought to be due to an inherited cancer  susceptibility gene. These families often have:    Several people with the same or related types of cancer    Cancers diagnosed at a young age (before age 50)    Individuals with more than one primary cancer    Multiple generations of the family affected with cancer    Some people may be candidates for genetic testing of more than one gene.  For these families, genetic testing using a cancer panel may be offered.  These panels can test many genes at once known to increase the risk for pancreatic (and other) cancers: APC, GIBSON, BRCA1, BRCA2, CDKN2A, EPCAM, MLH1, MSH2, MSH6, PALB2, PMS2, STK11, and TP53. The purpose of this handout is to serve as a brief summary of the pancreatic cancer risk genes and cancer syndrome with pancreatic cancer risks.     Hereditary Breast and Ovarian Cancer Syndrome  (BRCA1 and BRCA2)    A single mutation in one of the copies of BRCA1 or BRCA2 increases the risk for breast and ovarian cancer.  The risk for pancreatic cancer and melanoma may be slightly increased in some families. BRCA2 is considered the most common gene responsible for familial pancreatic cancer.    A person s ethnic background is also important to consider, as individuals of Ashkenazi [place] ancestry have a higher chance of having a BRCA gene mutation.  There are three BRCA mutations that occur more frequently in this population.    Alfredo Syndrome  (MLH1, MSH2, MSH6, PMS2, and EPCAM)    Currently five genes are known to cause Alfredo Syndrome: MLH1, MSH2, MSH6, PMS2, and EPCAM.  A single mutation in one of the Alfredo Syndrome genes increases the risk for colon, endometrial, ovarian, and stomach cancers.  Other cancers that occur less commonly in Alfredo Syndrome include urinary tract cancers, brain cancers, and other cancers.  People who have Alfredo Syndrome have up to a 6% risk of pancreatic cancer.     *Cancer risk varies depending on Alfredo syndrome gene found    Familial Atypical Multiple Mole Melanoma  Syndrome  (CDKN2A)    Familial Atypical Multiple Mole Melanoma Syndrome (FAMMM) is caused by a single mutation in the CDKN2A gene. This gene used to be called p16. The lifetime risk for melanoma is between 58-92%5. People with FAMMM have increased risks for pancreatic cancer, and possibly other cancers.      People with FAMMM typically have many moles (more than 50), which can be atypical.The exact risk of pancreatic cancer can depend on a person s specific CDKN2A mutation. The risk for pancreatic cancer be as high as 60% depending on the mutation.    Peutz-Jeghers Syndrome  (STK11)    Peutz-Jeghers Syndrome is caused by a mutation in the STK11 gene. The main features of Peutz-Jeghers Syndrome are multiple hamartomatous colon polyps and blue pigmentation of the lips and oral mucosa. Cancers associated with Peutz-Jeghers Syndrome include: gastrointestinal, gynecological, lung, breast, and pancreatic cancer. Up to a 36% lifetime risk of developing pancreatic cancer has been reported for those with Peutz-Jeghers syndrome.     Li-Fraumeni Syndrome  (TP53)    Li-Fraumeni Syndrome (LFS) is a cancer predisposition syndrome caused by a mutation in the TP53 gene. A single mutation in one of the copies of TP53 increases the risk for multiple cancers. Individuals with LFS are at an increased risk for developing cancer at a young age. The lifetime risk for development of a LFS-associated cancer is 50% by age 30 and 90% by age 60.      Core Cancers: Sarcomas, Breast, Brain, Lung, Leukemias/Lymphomas, Adrenocortical carcinomas    Other Cancers: Gastrointestinal (including pancreatic), Thyroid, Skin, Genitourinary    Familial Adenomatous Polyposis Syndrome  (APC)    Familial Adenomatous Polyposis syndrome (FAP) is caused by a single mutation in the APC gene and is characterize by having over 100 adenomatous polyps in the colon and significantly increased risk for colon cancer. These typically appear during adolescence. FAP is  associated with other tumors in the thyroid, stomach, and duodenum. People with FAP have an increased lifetime pancreatic cancer risk over the general population.    Additional Genes  PALB2  Mutations in the PALB2 gene have been shown to increase the risk of breast cancer up to 58% in some families. PALB2 mutations have also been associated with increased risk for pancreatic cancer.  Individuals who inherit two PALB2 mutations--one from their mother and one from their father--have a condition called Fanconi Anemia.  This condition is associated with short stature, developmental delay, bone marrow failure, and increased risk for childhood cancers.    GIBSON  Mutations in the GIBSON gene typically increase the risk of breast cancer 2-4 times higher than an average woman. GIBSON mutations have also been associated with an increased risk of pancreatic cancer. People who inherit an GIBSON mutation from both their mother and father have a condition called ataxia-telangiectasia. Ataxia telangiectasia is associated with ataxia in childhood (trouble with balance and walking), telangiectasias (red or purple spotty clusters on the skin), involuntary movements, frequent infections, and increased risk of leukemia and lymphoma.     Inheritance    All of the genes reviewed above are inherited in an autosomal dominant pattern.  This means that if a parent has a mutation, each of their children will have a 50% chance of inheriting that same mutation.  Every child--male or female--would have a 50% chance of inheriting the mutation and being at increased risk for developing cancer.       https://r.nlm.nih.gov/primer/inheritance/inheritancepatterns    Mutations in some genes can occur de ranjith, which means that a person s mutation occurred for the first time in them and was not inherited from a parent.  Now that they have the mutation, however, it can be passed on to future generations.     Genetic Testing  Genetic testing involves a blood test  and will look for any harmful mutations that are associated with increased cancer risk.  If possible, it is recommended that the person(s) who has had cancer be tested before other family members.  That person will give us the most useful information about whether or not a specific gene is associated with the cancer in the family.    Advantages and Disadvantages   There are advantages and disadvantages to genetic testing.  Advantages    May clarify your cancer risk and additional appropriate cancer screenings     Can help you make medical decisions    May explain the cancers in your family    May give useful information to your family members (if you share your results)     Disadvantages    Possible negative emotional impact of learning about inherited cancer risk    Uncertainty in interpreting a negative test result in some situations    Possible genetic discrimination concerns (see below)     Genetic Information Nondiscrimination Act (SAROJ)  SAROJ is a federal law that protects individuals from health insurance or employment discrimination based on a genetic test result.  There are currently no legal discrimination protections in terms of life insurance, long term care, or disability insurances.  Visit the National Human Genome Research [place] website to learn more: https://www.genome.gov/38092792/genetic-discrimination/    Questions to Think About Regarding Genetic Testing:    What effect will the test result have on me and my relationship with my family members if I have an inherited gene mutation?  If I don t have a gene mutation?    Should I share my test results, and how will my family react to this news, which may also affect them?    Are my children ready to learn new information that may one day affect their own health?    There are three possible results of genetic testing:    Positive--a harmful mutation was identified in one or more of the genes    Negative--no mutation was identified in any of the  genes on this panel    Variant of unknown significance (VUS)--a variation in one of the genes was identified, but it is unclear how this impacts cancer risk in the family  o Families with VUS results can contact their genetic counselor annually for updates     Reducing Cancer Risk  Recommendations are based upon an individual s genetic test result as well as their personal and family history of cancer. Pancreatic cancer screening may be available based on family history. Talk with your physician about screening options.     Cancer Resources      National Pancreatic Cancer Foundation: npcf.     Pancreatic Cancer Action Network: pancan.org     FORCE: Facing Our Risk of Cancer Empowered: facingourrisk.org    Bright [place]: bebrAdvanced Brain Monitoringpink.org    Li-Fraumeni Syndrome Association: lfsassociation.org    Collaborative Group of the Americas on Inherited Colorectal Cancer (CGA)   o cgaicc.com http://www.facingDigital Vega.org/    Cancer Care: cancercare.org    American Cancer Society (ACS): cancer.org    National Cancer [place] (NCI): cancer.gov      Please call us if you have any questions or concerns.   Cancer Risk Management Program [phone]-CANCER ([phone])  ? Pedro Arriaga, MS, [facility] [phone]  ? Breanne Romeo, MS, [facility]  [phone]  ? Yuliana Solares, MS, [facility]  [phone]  ? Charity Cordero, MS, [facility] [phone]  ? Evie Abraham, MS, [facility] [phone]   ? Tamara Esparza, MS, [facility]  [phone]    References  1. Genetic / Familial High-Risk Assessment?: Breast and Ovarian. NCCN Pract Guidel Oncol. 2017;1.2018.  2. Fatuma J, Laury A, Levy J, et al. The incidence of pancreatic cancer in BRCA1 and BRCA2 mutation carriers. Br J Cancer. 2012;107(12):1032-2323. doi:10.1038/bjc.2012.483.  3. Pietro BOYLE, Ed KG, Pravin S, et al. BRCA1, BRCA2, PALB2, and CDKN2A mutations in familial pancreatic cancer: a PACGENE study. Bryanna Med. 2015;17(7):569-577. doi:10.1038/gim.2014.153.  4. Cem CRAMER. Genetic / Familial High-Risk Assessment?:  Colorectal. NCCN Pract Guidel Oncol. 2017;2.2017.  5. Addy BLACK,  M, Anastasiya E, Gabriel J. Familial Atypical Multiple Mole Melanoma Syndrome. National Center for Biotechnology Information ([place]); 2009. http://www.ncbi.nlm.nih.gov/pubmed/51320553. Accessed October 10, 2017.  6. Alfredo HT, Fusbella RM, Herbert J, et al. Tumour spectrum in the FAMMM syndrome. Br J Cancer. 1981;44(4):553-560. http://www.ncbi.nlm.nih.gov/pubmed/1600140. Accessed October 10, 2017.  7. Maribel F, Brooklyn J, Grupo A, et al. 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 Gastroenterology. 2000;119(6):1729-8868. doi:10.1053/GIANNI.2000.88144.  8. Maribel LEWIS, Offerhaus GJ, Joselito DH, et al. Increased risk of thyroid and pancreatic carcinoma in familial adenomatous polyposis. Gut. 1993;34(10):8230-5073. doi:10.1136/GUT.34.10.1394.

## 2021-03-31 ENCOUNTER — VIRTUAL VISIT (OUTPATIENT)
Dept: ONCOLOGY | Facility: CLINIC | Age: 41
End: 2021-03-31
Attending: GENETIC COUNSELOR, MS
Payer: COMMERCIAL

## 2021-03-31 DIAGNOSIS — Z80.3 FAMILY HISTORY OF MALIGNANT NEOPLASM OF BREAST: Primary | ICD-10-CM

## 2021-03-31 DIAGNOSIS — Z80.0 FAMILY HISTORY OF PANCREATIC CANCER: ICD-10-CM

## 2021-03-31 DIAGNOSIS — Z80.8 FAMILY HISTORY OF MELANOMA: ICD-10-CM

## 2021-03-31 PROCEDURE — 96040 HC GENETIC COUNSELING, EACH 30 MINUTES: CPT | Mod: GT | Performed by: GENETIC COUNSELOR, MS

## 2021-03-31 NOTE — PROGRESS NOTES
"3/31/2021    Ruma is a 40 year old who is being evaluated via a billable video visit.      Video-Visit Details    Type of service: Video Visit    Video Start Time: 08:04 am  Video End Time: 08:39 am    Originating Location (pt. Location): Home    Distant Location (provider location): Cancer Risk Management Program    Platform used for Video Visit: Taurus    Referring Provider: Pamela Valdivia PA-C    Presenting Information:  I spoke with Ruma over video to discuss her genetic testing results. Her blood was drawn on 3/8/21. An expanded Custom panel (149 genes associated with an increased risk for multiple different types of cancer) was ordered from [facility]. This testing was done because of her family history of cancer.    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 This variant is called c.3367C>T (also known as p.[id number]*). Given the uncertain significance of this result, medical management decisions should NOT be made based on this test result alone.    She does not have any of the three variants of uncertain significance that had previously been detected in her maternal aunt.    Of note, Ruma tested negative for mutations or other variants of uncertain significance in the 149 genes analyzed. Please see scanned report for full gene list. We reviewed the autosomal dominant inheritance of these genes. Ruma cannot pass on a mutation in any of these genes to her children based on this test result. Mutations in these genes do not skip generations.      A copy of the test report can be found in the Laboratory tab, dated 3/8/21, and named \"[facility]". The report is scanned in as a linked document.    Interpretation:  We discussed several different interpretations of this inconclusive test result. It is not clear if this variant in the MLH3 gene is associated with increased cancer " risk.  1. This variant may be a benign change that does not increase cancer risk.  2. This variant may be a harmful mutation that causes an increased risk for cancer.    We discussed that known pathogenic (harmful) mutations in the MLH3 gene currently have preliminary evidence supporting an association with Alfredo syndrome, however there is still limited data regarding this. Cancer risks associated with Alfredo syndrome include colon cancer, endometrial/uterine cancer, gastric cancer, and ovarian cancer. Other cancers have also been reported with Alfredo syndrome, such as urinary tract, pancreas, bile duct, and others.     The laboratory is working to determine if this variant is harmful or benign, and they will contact me if it is reclassified. If this variant is determined to be a benign change, there may be a different gene or combination of genes and environment that are associated with the cancers in this family.    It is also important to consider that her relatives may have a mutation in one of the genes tested and she did not inherit it.      Inheritance:  We reviewed the autosomal dominant inheritance of this variant in the MLH3 gene. We discussed that Ruma has a 50% chance to pass this variant to each of her children. Likewise, each of her siblings has a 50% chance of having the same variant. Because it is unclear what, if any, risk is associated with this variant, clinical genetic testing for this MLH3 variant alone is not recommended for relatives.    Screening:  Based on this inconclusive test result, it is important for Ruma and her relatives to refer back to the family history for appropriate cancer screening.      Based on the personal and family history information she provided, Ruma has an estimated 24.5% lifetime risk of developing breast cancer based on the MAISHA Risk Evaluation v8 model. As such, Ruma meets current National Comprehensive Cancer Network (NCCN) guidelines for high risk  breast screening. This includes annual breast MRI in addition to annual mammogram (alternating every 6 months). In addition, Ruma should be receiving clinical breast exams by her physician. We discussed that Ruma could participate in our Cancer Risk Management Program in which our clinical nurse specialist provides an individual screening plan and assists with medical management. A referral was made to see DANA Brown, for this service.    Other population cancer screening options, such as those recommended by the American Cancer Society and the National Comprehensive Cancer Network (NCCN), are also appropriate for Ruma and her family. These screening recommendations may change if there are changes to Ruma's personal and/or family history of cancer. Final screening recommendations should be made by each individual's managing physician.    Additional Testing Considerations:  Although Ruma's genetic testing result is inconclusive, other relatives may still carry a harmful gene mutation associated with an increased risk for cancer. Genetic counseling is recommended for her maternal aunt who has had breast and pancreatic cancer to discuss genetic testing options. If she decides not to test, then Ruma's mother and other maternal relatives should consider genetic testing. If any of these relatives do pursue genetic testing, Ruma is encouraged to contact me so that we may review the impact of their test results on her.    Summary:  We do not have an explanation for Ruma's family history of cancer. While no genetic changes were identified, Ruma may still be at risk for certain cancers due to family history, environmental factors, or other genetic causes not identified by this test. Because of that, it is important that she continue with cancer screening based on her personal and family history as discussed above.    Genetic testing is rapidly advancing, and new cancer susceptibility  genes will most likely be identified in the future. Therefore, I encouraged Ruma to contact me annually or if there are changes in her personal or family history. This may change how we assess her cancer risk, screening, and the testing we would offer.    Plan:  1.  Ruma will be mailed a copy of her test results along with this letter.    2.  She plans to follow-up with DANA Brown and her physicians.  3.  She should contact me annually, or sooner if her family history changes.  4.  I will contact Ruma if the laboratory informs me that this VUS has been reclassified. This may change screening and testing recommendations for Ruma and her relatives.    If Ruma has any further questions, I encouraged her to contact me at [phone].    Time spent over video: 35 minutes    Tamara Esparza MS, [facility]  Licensed, Certified Genetic Counselor  Office: [phone]  Email: [email or web address]

## 2021-03-31 NOTE — LETTER
Cancer Risk Management  Program Locations    [facility]  [facility]  [facility]  [facility]  Mailing Address  Cancer Risk Management Program  [address]  [place] [address]    New patient appointments  [phone]  June 1, 2021    Ruma Vega  [address] [facility] [address]      Dear Ruma,    It was a pleasure speaking with you over video for genetic couseling on 3/31/2021. Here is a copy of the progress note from our discussion. If you have any additional questions, please feel free to call.    Referring Provider: Pamela Valdivia PA-C    Presenting Information:  I spoke with Ruma over video to discuss her genetic testing results. Her blood was drawn on 3/8/21. An expanded Custom panel (149 genes associated with an increased risk for multiple different types of cancer) was ordered from [facility]. This testing was done because of her family history of cancer.    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 This variant is called c.3367C>T (also known as p.[id number]*). Given the uncertain significance of this result, medical management decisions should NOT be made based on this test result alone.    She does not have any of the three variants of uncertain significance that had previously been detected in her maternal aunt.    Of note, Ruma tested negative for mutations or other variants of uncertain significance in the 149 genes analyzed. Please see scanned report for full gene list. We reviewed the autosomal dominant inheritance of these genes. Ruma cannot pass on a mutation in any of these genes to her children based on this test result. Mutations in these genes do not skip generations.       Interpretation:  We discussed several different interpretations of this inconclusive test result. It is not clear if this variant in the MLH3 gene is associated with increased cancer risk.  1. This variant may be a benign change that does not increase cancer risk.  2. This variant may be a harmful mutation that causes an increased risk for cancer.    We discussed that known pathogenic (harmful) mutations in the MLH3 gene currently have preliminary evidence supporting an association with Alfredo syndrome, however there is still limited data regarding this. Cancer risks associated with Alfredo syndrome include colon cancer, endometrial/uterine cancer, gastric cancer, and ovarian cancer. Other cancers have also been reported with Alfredo syndrome, such as urinary tract, pancreas, bile duct, and others.     The laboratory is working to determine if this variant is harmful or benign, and they will contact me if it is reclassified. If this variant is determined to be a benign change, there may be a different gene or combination of genes and environment that are associated with the cancers in this family.    It is also important to consider that her relatives may have a mutation in one of the genes tested and she did not inherit it.      Inheritance:  We reviewed the autosomal dominant inheritance of this variant in the MLH3 gene. We discussed that Ruma has a 50% chance to pass this variant to each of her children. Likewise, each of her siblings has a 50% chance of having the same variant. Because it is unclear what, if any, risk is associated with this variant, clinical genetic testing for this MLH3 variant alone is not recommended for relatives.    Screening:  Based on this inconclusive test result, it is important for Ruma and her relatives to refer back to the family history for appropriate cancer screening.      Based on the personal and family history information she provided, Ruma has an estimated 24.5%  lifetime risk of developing breast cancer based on the MAISHA Risk Evaluation v8 model. As such, Ruma meets current National Comprehensive Cancer Network (NCCN) guidelines for high risk breast screening. This includes annual breast MRI in addition to annual mammogram (alternating every 6 months). In addition, Ruma should be receiving clinical breast exams by her physician. We discussed that Ruma could participate in our Cancer Risk Management Program in which our clinical nurse specialist provides an individual screening plan and assists with medical management. A referral was made to see DANA Brown, for this service.    Other population cancer screening options, such as those recommended by the American Cancer Society and the National Comprehensive Cancer Network (NCCN), are also appropriate for Ruma and her family. These screening recommendations may change if there are changes to Ruma's personal and/or family history of cancer. Final screening recommendations should be made by each individual's managing physician.    Additional Testing Considerations:  Although Ruma's genetic testing result is inconclusive, other relatives may still carry a harmful gene mutation associated with an increased risk for cancer. Genetic counseling is recommended for her maternal aunt who has had breast and pancreatic cancer to discuss genetic testing options. If she decides not to test, then Ruma's mother and other maternal relatives should consider genetic testing. If any of these relatives do pursue genetic testing, Ruma is encouraged to contact me so that we may review the impact of their test results on her.    Summary:  We do not have an explanation for Ruma's family history of cancer. While no genetic changes were identified, Ruma may still be at risk for certain cancers due to family history, environmental factors, or other genetic causes not identified by this test. Because of that, it  is important that she continue with cancer screening based on her personal and family history as discussed above.    Genetic testing is rapidly advancing, and new cancer susceptibility genes will most likely be identified in the future. Therefore, I encouraged Ruma to contact me annually or if there are changes in her personal or family history. This may change how we assess her cancer risk, screening, and the testing we would offer.    Plan:  1.  Ruma will be mailed a copy of her test results along with this letter.    2.  She plans to follow-up with DANA Brown and her physicians.  3.  She should contact me annually, or sooner if her family history changes.  4.  I will contact Ruma if the laboratory informs me that this VUS has been reclassified. This may change screening and testing recommendations for Ruma and her relatives.    If Ruma has any further questions, I encouraged her to contact me at [phone].    Tamara Esparza MS, [facility]  Licensed, Certified Genetic Counselor  Office: [phone]  Email: [email or web address]

## 2021-03-31 NOTE — Clinical Note
Please send copy of letter to patient with test results. Please enclose test results: Send outs misc test [[id number]] (Order [id number])

## 2021-03-31 NOTE — TELEPHONE ENCOUNTER
RECORDS STATUS - BREAST    RECORDS REQUESTED FROM: [facility]   DATE REQUESTED: 7/23/2021   NOTES DETAILS STATUS   OFFICE NOTE from referring provider Tamara Cha GC   OFFICE NOTE from medical oncologist Complete 3/31/2021 Virtual Visit - Family Hx of malignant Neoplasm of breast (Primary Dx)     3/1/2021  Virtual Visit - Family Hx of malignant Neoplasm of breast (Primary Dx)    OFFICE NOTE from surgeon N/A    OFFICE NOTE from radiation oncologist     OPERATIVE REPORT N/A    LABS     PATHOLOGY REPORTS  (Tissue diagnosis, Stage, ER/GA percentage positive and intensity of staining, HER2 IHC, FISH, and all biopsies from breast and any distant metastasis)                 N/A    GENONOMIC TESTING     TYPE:   (Next Generation Sequencing, including Foundation One testing, and Oncotype score) Complete [facility] 3/8/2021   IMAGING (NEED IMAGES & REPORT)     CT SCANS     MRI Complete MRI Breast Bilateral 7/14/2021   MAMMO Complete MA Screening Bilateral 1/28/2021    MA Screening Bilateral 11/20/2019    ULTRASOUND     PET     BONE SCAN     BRAIN MRI

## 2021-05-05 LAB — LAB SCANNED RESULT: NORMAL

## 2021-06-03 ENCOUNTER — OFFICE VISIT (OUTPATIENT)
Dept: PEDIATRICS | Facility: CLINIC | Age: 41
End: 2021-06-03
Payer: COMMERCIAL

## 2021-06-03 VITALS
HEIGHT: 69 IN | HEART RATE: 76 BPM | DIASTOLIC BLOOD PRESSURE: 82 MMHG | BODY MASS INDEX: 36.21 KG/M2 | TEMPERATURE: 98.2 F | WEIGHT: 244.5 LBS | OXYGEN SATURATION: 98 % | SYSTOLIC BLOOD PRESSURE: 128 MMHG

## 2021-06-03 DIAGNOSIS — L30.9 ECZEMA OF BOTH HANDS: ICD-10-CM

## 2021-06-03 DIAGNOSIS — R79.89 ELEVATED SERUM CREATININE: Primary | ICD-10-CM

## 2021-06-03 DIAGNOSIS — Z52.4 KIDNEY DONOR: Primary | ICD-10-CM

## 2021-06-03 DIAGNOSIS — D68.51 FACTOR V LEIDEN (H): ICD-10-CM

## 2021-06-03 DIAGNOSIS — I10 BENIGN ESSENTIAL HYPERTENSION: ICD-10-CM

## 2021-06-03 DIAGNOSIS — Z90.5 HISTORY OF NEPHRECTOMY: ICD-10-CM

## 2021-06-03 DIAGNOSIS — G43.009 MIGRAINE WITHOUT AURA AND WITHOUT STATUS MIGRAINOSUS, NOT INTRACTABLE: ICD-10-CM

## 2021-06-03 LAB
ANION GAP SERPL CALCULATED.3IONS-SCNC: 4 MMOL/L (ref 3–14)
BUN SERPL-MCNC: 16 MG/DL (ref 7–30)
CALCIUM SERPL-MCNC: 9 MG/DL (ref 8.5–10.1)
CHLORIDE SERPL-SCNC: 105 MMOL/L (ref 94–109)
CO2 SERPL-SCNC: 28 MMOL/L (ref 20–32)
CREAT SERPL-MCNC: 1.15 MG/DL (ref 0.52–1.04)
GFR SERPL CREATININE-BSD FRML MDRD: 59 ML/MIN/{1.73_M2}
GLUCOSE SERPL-MCNC: 88 MG/DL (ref 70–99)
POTASSIUM SERPL-SCNC: 3.6 MMOL/L (ref 3.4–5.3)
SODIUM SERPL-SCNC: 137 MMOL/L (ref 133–144)

## 2021-06-03 PROCEDURE — 99214 OFFICE O/P EST MOD 30 MIN: CPT | Performed by: PEDIATRICS

## 2021-06-03 PROCEDURE — 80048 BASIC METABOLIC PNL TOTAL CA: CPT | Performed by: PEDIATRICS

## 2021-06-03 PROCEDURE — 36415 COLL VENOUS BLD VENIPUNCTURE: CPT | Performed by: PEDIATRICS

## 2021-06-03 RX ORDER — MOMETASONE FUROATE 1 MG/G
CREAM TOPICAL
Qty: 15 G | Refills: 3 | Status: SHIPPED | OUTPATIENT
Start: 2021-06-03 | End: 2022-08-22

## 2021-06-03 RX ORDER — LOSARTAN POTASSIUM AND HYDROCHLOROTHIAZIDE 25; 100 MG/1; MG/1
1 TABLET ORAL DAILY
Qty: 90 TABLET | Refills: 3 | Status: SHIPPED | OUTPATIENT
Start: 2021-06-03 | End: 2022-08-22

## 2021-06-03 RX ORDER — RIZATRIPTAN BENZOATE 5 MG/1
5 TABLET, ORALLY DISINTEGRATING ORAL
Qty: 12 TABLET | Refills: 5 | Status: SHIPPED | OUTPATIENT
Start: 2021-06-03

## 2021-06-03 ASSESSMENT — MIFFLIN-ST. JEOR: SCORE: 1838.42

## 2021-06-03 NOTE — PROGRESS NOTES
"  Assessment & Plan     Benign essential hypertension  Controlled, due for yearly labs. Of note, patient only with one kidney.  - losartan-hydrochlorothiazide (HYZAAR) 100-25 MG tablet; Take 1 tablet by mouth daily  - Basic metabolic panel  (Ca, Cl, CO2, Creat, Gluc, K, Na, BUN)    Factor V Leiden (H)  Stable - does have heme that she has seen previously at the      Migraine without aura and without status migrainosus, not intractable  Controlled, none x 1 y  - rizatriptan (MAXALT-MLT) 5 MG ODT; Take 1 tablet (5 mg) by mouth at onset of headache for migraine May repeat in 2 hours. Max 6 tablets/24 hours.    Eczema of both hands  Continue prn use  - mometasone (ELOCON) 0.1 % external cream; Apply thin layer to affected areas 1 to 2 times daily as needed.    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    Weight as of this encounter: 110.9 kg (244 lb 8 oz).       Patient Instructions   Continue blood pressure medications.    Check labs today.          Return in about 1 year (around 6/3/2022) for with me, Follow up, in person.    Liliana Deng MD  [facility] SEYMOUR Hendrix is a 40 year old who presents for the following health issues     History of Present Illness       Hypertension: She presents for follow up of hypertension.  She does not check blood pressure  regularly outside of the clinic. Outpatient blood pressures have not been over 140/90. She does not follow a low salt diet.     She eats 0-1 servings of fruits and vegetables daily.She consumes 0 sweetened beverage(s) daily.She exercises with enough effort to increase her heart rate 30 to 60 minutes per day.  She exercises with enough effort to increase her heart rate 6 days per week.   She is taking medications regularly.     hypertension " "started when she had her son - started increasing after birth as well. Patient with one kidney - donated to dad 10 years ago. (dad had lithium toxicity and needed kidney transplant)    Patient had some elevated liver function tests early in pregnancy - resolved. Also BP elevated and they decided to deliver baby 2 weeks early. Undetermined if post partum hypertension related to pregnancy. Dad and sister and mom with hypertension.     Establish care:    Patient works as an  with trusts - working mainly remotely, and some in person.  Planning July RTW.     --breast cancer monitoring - following with oncology - PGM with breast cancer in 40s.  Two maternal aunts with breast cancer in 50s.  One m. Aunt has also had pancreatic cancer and rare skin cancers.   --planning for 6 months mammo/MRI with oncology    --Migraines - with vomiting.  None since working remotely.  Uses maxalt prn.  No migraine in over a year.     --eczema - uses mometasone prn a few time per month - between fingers    --recurrent sinus infections - very successful with netti pot        Review of Systems   Constitutional, HEENT, cardiovascular, pulmonary, gi and gu systems are negative, except as otherwise noted.      Objective    /82 (BP Location: Right arm, Patient Position: Sitting, Cuff Size: Adult Large)   Pulse 76   Temp 98.2  F (36.8  C) (Temporal)   Ht 1.753 m (5' 9\")   Wt 110.9 kg (244 lb 8 oz)   SpO2 98%   BMI 36.11 kg/m    There is no height or weight on file to calculate BMI.  Physical Exam   GENERAL APPEARANCE: healthy, alert and no distress  CV: regular rates and rhythm, normal S1 S2, no S3 or S4 and no murmur, click or rub                "

## 2021-07-02 DIAGNOSIS — R79.89 ELEVATED SERUM CREATININE: ICD-10-CM

## 2021-07-02 PROCEDURE — 36415 COLL VENOUS BLD VENIPUNCTURE: CPT | Performed by: PEDIATRICS

## 2021-07-02 PROCEDURE — 80048 BASIC METABOLIC PNL TOTAL CA: CPT | Performed by: PEDIATRICS

## 2021-07-03 LAB
ANION GAP SERPL CALCULATED.3IONS-SCNC: 4 MMOL/L (ref 3–14)
BUN SERPL-MCNC: 11 MG/DL (ref 7–30)
CALCIUM SERPL-MCNC: 8.2 MG/DL (ref 8.5–10.1)
CHLORIDE SERPL-SCNC: 106 MMOL/L (ref 94–109)
CO2 SERPL-SCNC: 28 MMOL/L (ref 20–32)
CREAT SERPL-MCNC: 0.99 MG/DL (ref 0.52–1.04)
GFR SERPL CREATININE-BSD FRML MDRD: 70 ML/MIN/{1.73_M2}
GLUCOSE SERPL-MCNC: 79 MG/DL (ref 70–99)
POTASSIUM SERPL-SCNC: 3.5 MMOL/L (ref 3.4–5.3)
SODIUM SERPL-SCNC: 138 MMOL/L (ref 133–144)

## 2021-07-05 DIAGNOSIS — R79.89 LOW SERUM CALCIUM: Primary | ICD-10-CM

## 2021-07-23 ENCOUNTER — PRE VISIT (OUTPATIENT)
Dept: ONCOLOGY | Facility: CLINIC | Age: 41
End: 2021-07-23

## 2021-08-21 NOTE — LETTER
9/25/2019         Ruma Vega   [address] [facility]  [place] [address]        Dear Ms. Vega:    Ruma-Your cholesterol is slightly elevated. Your kidney function is normal. Blood sugar is also normal    Results for orders placed or performed in visit on 09/23/19   Protein  random urine with Creat Ratio   Result Value Ref Range    Protein Random Urine <0.05 g/L    Protein Total Urine g/gr Creatinine Unable to calculate due to low value 0 - 0.2 g/g Cr   Lipid Panel   Result Value Ref Range    Cholesterol 211 (H) <200 mg/dL    Triglycerides 189 (H) <150 mg/dL    HDL Cholesterol 49 (L) >49 mg/dL    LDL Cholesterol Calculated 124 (H) <100 mg/dL    Non HDL Cholesterol 162 (H) <130 mg/dL   Basic Metabolic Panel   Result Value Ref Range    Sodium 140 133 - 144 mmol/L    Potassium 3.8 3.4 - 5.3 mmol/L    Chloride 103 94 - 109 mmol/L    Carbon Dioxide 30 20 - 32 mmol/L    Anion Gap 7 3 - 14 mmol/L    Glucose 89 70 - 99 mg/dL    Urea Nitrogen 7 7 - 30 mg/dL    Creatinine 0.95 0.52 - 1.04 mg/dL    GFR Estimate 75 >60 mL/min/[1.73_m2]    GFR Estimate If Black 87 >60 mL/min/[1.73_m2]    Calcium 8.8 8.5 - 10.1 mg/dL   Creatinine urine calculation only   Result Value Ref Range    Creatinine Urine 23 mg/dL         Please note that test explanations are brief and do not reflect all diagnostic uses.  If you have any questions or concerns, please call the clinic at [phone].      Sincerely,      Lyly Mayes LPN sent on behalf of  Agueda Angel MD   Yes

## 2021-09-04 ENCOUNTER — HEALTH MAINTENANCE LETTER (OUTPATIENT)
Age: 41
End: 2021-09-04

## 2021-11-03 ENCOUNTER — LAB (OUTPATIENT)
Dept: URGENT CARE | Facility: URGENT CARE | Age: 41
End: 2021-11-03
Attending: PHYSICIAN ASSISTANT
Payer: COMMERCIAL

## 2021-11-03 ENCOUNTER — E-VISIT (OUTPATIENT)
Dept: URGENT CARE | Facility: URGENT CARE | Age: 41
End: 2021-11-03
Payer: COMMERCIAL

## 2021-11-03 DIAGNOSIS — Z20.822 CLOSE EXPOSURE TO 2019 NOVEL CORONAVIRUS: Primary | ICD-10-CM

## 2021-11-03 DIAGNOSIS — Z20.822 CLOSE EXPOSURE TO 2019 NOVEL CORONAVIRUS: ICD-10-CM

## 2021-11-03 PROCEDURE — U0005 INFEC AGEN DETEC AMPLI PROBE: HCPCS

## 2021-11-03 PROCEDURE — U0003 INFECTIOUS AGENT DETECTION BY NUCLEIC ACID (DNA OR RNA); SEVERE ACUTE RESPIRATORY SYNDROME CORONAVIRUS 2 (SARS-COV-2) (CORONAVIRUS DISEASE [COVID-19]), AMPLIFIED PROBE TECHNIQUE, MAKING USE OF HIGH THROUGHPUT TECHNOLOGIES AS DESCRIBED BY CMS-2020-01-R: HCPCS

## 2021-11-03 PROCEDURE — 99421 OL DIG E/M SVC 5-10 MIN: CPT | Performed by: PHYSICIAN ASSISTANT

## 2021-11-03 NOTE — PATIENT INSTRUCTIONS
"  Dear Ruma Vega,    Based on your exposure to COVID-19 (coronavirus), we would like to test you for this virus. I have placed an order for this test.The best time for testing is 5-7 days after the exposure.    How to schedule:  Go to your [email or web address] home page and scroll down to the section that says  You have an appointment that needs to be scheduled  and click the large green button that says  Schedule Now  and follow the steps to find the next available opening.     If you are unable to complete these [email or web address] scheduling steps, please call [phone] to schedule your testing.     Return to work/school/ guidance:   For people with high risk exposures outside the home    Please let your workplace manager and staffing office know when your quarantine ends.     We can not give you an exact date as it depends on the information below. You can calculate this on your own or work with your manager/staffing office to calculate this. (For example if you were exposed on 10/4, you would have to quarantine for 14 full days. That would be through 10/18. You could return on 10/19.)    Quarantine Guidelines:  Patients (\"contacts\") who have been in close prolonged contact of an infected person(s) (within six feet for at least 15 minutes within a 24 hour period), and remain asymptomatic should enter quarantine based on the following options:    14-day quarantine period (this remains the CDC recommendation for the greatest protection against spread of COVID-19) OR    Minimum 7-day quarantine with negative RT-PCR test collected on day 5 or later OR    10-day quarantine with no test  Quarantine Guideline exceptions are as follows:    People who have been fully vaccinated do not need to quarantine if the exposure was at least 2 weeks after the last vaccination. This includes vaccinated health care workers.    Not fully vaccinated and unvaccinated Individuals who work in health care, congregate care, or congregate " living should be off work for 14 days from their last date of exposure. Community activities for this group can be resumed based on options above. Fully vaccinated individuals in this group do not need to quarantine from work after exposure.    Not fully vaccinated and unvaccinated people whose high-risk exposure was a household member should always quarantine for 14 days from their last date of exposure. Fully vaccinated people in this category do not need to quarantine.    Not fully vaccinated or unvaccinated residents of congregate care and congregate living settings should always quarantine for 14 days from their last date of exposure. Fully vaccinated residents do not need to quarantine.  Note: If you have ongoing exposure to the covid positive person, this quarantine period may be more than 14 days. (For example, if you are continued to be exposed to your child who tested positive and cannot isolate from them, then the quarantine of 7-14 days can't start until your child is no longer contagious. This is typically 10 days from onset of the child's symptoms. So the total duration may be 17-24 days in this case.)    You should continue symptom monitoring until day 14 post-exposure. If you develop signs or symptoms of COVID-19, isolate and get tested (even if you have been tested already).    How to quarantine:   Stay home and away from others. Don't go to school or anywhere else. Generally quarantine means staying home from work but there are some exceptions to this. Please contact your workplace.  No hugging, kissing or shaking hands.  Don't let anyone visit.  Cover your mouth and nose with a mask, tissue or washcloth to avoid spreading germs.  Wash your hands and face often. Use soap and water.    What are the symptoms of COVID-19?  The most common symptoms are cough, fever and trouble breathing. Less common symptoms include headache, body aches, fatigue (feeling very tired), chills, sore throat, stuffy or  runny nose, diarrhea (loose poop), loss of taste or smell, belly pain, and nausea or vomiting (feeling sick to your stomach or throwing up).  After 14 days, if you have still don't have symptoms, you likely don't have this virus.  If you develop symptoms, follow these guidelines.  If you're normally healthy: Please start another eVisit.  If you have a serious health problem (like cancer, heart failure, an organ transplant or kidney disease): Call your specialty clinic. Let them know that you might have COVID-19.    Where can I get more information?   [email or web address] - About COVID-19: [email or web address]  CDC - What to Do If You're Sick: www.cdc.gov/coronavirus/2019-ncov/about/steps-when-sick.html  CDC - Ending Home Isolation: www.cdc.gov/coronavirus/2019-ncov/hcp/disposition-in-home-patients.html  CDC - Caring for Someone: www.cdc.gov/coronavirus/2019-ncov/if-you-are-sick/care-for-someone.html  [facility] clinical trials (COVID-19 research studies): clinicalaffairs.[facility]-clinical-trials  Below are the COVID-19 hotlines at the [facility] ([facility]). Interpreters are available.  For health questions: Call 590-841-6989 or 1-746.536.6996 (7 a.m. to 7 p.m.)  For questions about schools and childcare: Call 231-538-2910 or 1-544.630.6420 (7 a.m. to 7 p.m.)        November 3, 2021  RE:  Ruma Vega                                                                                                                   [address]  [place] [address]      To whom it may concern:    I evaluated Ruma Vega on November 3, 2021. Ruma Vega should be excused from work/school.    They should let their workplace manager and staffing office know when their quarantine ends.    We can not give an exact date as it depends on the information below. They can calculate this on their own or work with their manager/staffing office to calculate this. (For example  "if they were exposed on 10/04, they would have to quarantine for 14 full days. That would be through 10/18. They could return on 10/19.)    Quarantine Guidelines:    Patients (\"contacts\") who have been in close prolonged contact of an infected person(s) (within six feet for at least 15 minutes within a 24 hour period) and remain asymptomatic should enter quarantine based on the following options:      14-day quarantine period (this remains the CDC recommendation for the greatest protection against spread of COVID-19) OR    Minimum 7-day quarantine with negative RT-PCR test collected on day 5 or later OR    10-day quarantine with no test   Quarantine Guideline exceptions are as follows:    People who have been fully vaccinated do not need to quarantine if the exposure was at least 2 weeks after the last vaccination. This includes vaccinated health care workers.    Not fully vaccinated and unvaccinated Individuals who work in health care, congregate care, or congregate living should be off work for 14 days from their last date of exposure. Community activities for this group can be resumed based on options above. Fully vaccinated individuals in this group do not need to quarantine from work after exposure.    Not fully vaccinated and unvaccinated people whose high-risk exposure was a household member should always quarantine for 14 days from their last date of exposure. Fully vaccinated people in this category do not need to quarantine.    Not fully vaccinated or unvaccinated residents of congregate care and congregate living settings should always quarantine for 14 days from their last date of exposure. Fully vaccinated residents do not need to quarantine.    Note: If there is ongoing exposure to the covid positive person, this quarantine period may be longer than 14 days. (For example, if they are continually exposed to their child, who tested positive and cannot isolate from them, then the quarantine of 7-14 days " can't start until their child is no longer contagious. This is typically 10 days from onset to the child's symptoms. So the total duration may be 17-24 days in this case.)    Ruma Vega should continue symptom monitoring until day 14 post-exposure. If they develop signs or symptoms of COVID-19, they should isolate and get tested (even if they have been tested already).    Sincerely,  Alexander Judge PA-C

## 2021-11-04 LAB — SARS-COV-2 RNA RESP QL NAA+PROBE: NEGATIVE

## 2022-04-16 ENCOUNTER — HEALTH MAINTENANCE LETTER (OUTPATIENT)
Age: 42
End: 2022-04-16

## 2022-04-29 ENCOUNTER — LAB (OUTPATIENT)
Dept: URGENT CARE | Facility: URGENT CARE | Age: 42
End: 2022-04-29
Attending: FAMILY MEDICINE
Payer: COMMERCIAL

## 2022-04-29 DIAGNOSIS — Z20.822 SUSPECTED 2019 NOVEL CORONAVIRUS INFECTION: ICD-10-CM

## 2022-04-29 LAB — SARS-COV-2 RNA RESP QL NAA+PROBE: NEGATIVE

## 2022-04-29 PROCEDURE — U0005 INFEC AGEN DETEC AMPLI PROBE: HCPCS

## 2022-04-29 PROCEDURE — U0003 INFECTIOUS AGENT DETECTION BY NUCLEIC ACID (DNA OR RNA); SEVERE ACUTE RESPIRATORY SYNDROME CORONAVIRUS 2 (SARS-COV-2) (CORONAVIRUS DISEASE [COVID-19]), AMPLIFIED PROBE TECHNIQUE, MAKING USE OF HIGH THROUGHPUT TECHNOLOGIES AS DESCRIBED BY CMS-2020-01-R: HCPCS

## 2022-08-22 ENCOUNTER — VIRTUAL VISIT (OUTPATIENT)
Dept: PEDIATRICS | Facility: CLINIC | Age: 42
End: 2022-08-22
Payer: COMMERCIAL

## 2022-08-22 DIAGNOSIS — D68.51 FACTOR V LEIDEN (H): ICD-10-CM

## 2022-08-22 DIAGNOSIS — I10 BENIGN ESSENTIAL HYPERTENSION: Primary | ICD-10-CM

## 2022-08-22 DIAGNOSIS — L30.9 ECZEMA OF BOTH HANDS: ICD-10-CM

## 2022-08-22 DIAGNOSIS — Z13.6 SCREENING FOR CARDIOVASCULAR CONDITION: ICD-10-CM

## 2022-08-22 PROCEDURE — 99214 OFFICE O/P EST MOD 30 MIN: CPT | Mod: GT | Performed by: PEDIATRICS

## 2022-08-22 RX ORDER — LOSARTAN POTASSIUM AND HYDROCHLOROTHIAZIDE 25; 100 MG/1; MG/1
1 TABLET ORAL DAILY
Qty: 90 TABLET | Refills: 3 | Status: SHIPPED | OUTPATIENT
Start: 2022-08-22 | End: 2023-09-12

## 2022-08-22 RX ORDER — MOMETASONE FUROATE 1 MG/G
CREAM TOPICAL
Qty: 15 G | Refills: 3 | Status: SHIPPED | OUTPATIENT
Start: 2022-08-22

## 2022-08-22 RX ORDER — CETIRIZINE HYDROCHLORIDE 10 MG/1
10 TABLET ORAL DAILY
COMMUNITY
End: 2023-01-06

## 2022-08-22 NOTE — PATIENT INSTRUCTIONS
Dear Ruma,    It was great to see you today.    We will check the labs for your blood pressure medications. Please make a fasting lab only appt.    Liliana Deng MD  Internal Medicine/Pediatrics  [facility]

## 2022-08-22 NOTE — PROGRESS NOTES
Ruma is a 41 year old who is being evaluated via a billable video visit.      How would you like to obtain your AVS? MyChart  If the video visit is dropped, the invitation should be resent by: EPIC  Will anyone else be joining your video visit? No        Assessment & Plan     Benign essential hypertension  Controlled, due for labs - couldn't get physical until Jan  - Basic metabolic panel  (Ca, Cl, CO2, Creat, Gluc, K, Na, BUN); Future  - losartan-hydrochlorothiazide (HYZAAR) 100-25 MG tablet; Take 1 tablet by mouth daily    Factor V Leiden (H)  stable    Eczema of both hands  Stable, refill needed  - mometasone (ELOCON) 0.1 % external cream; Apply thin layer to affected areas 1 to 2 times daily as needed.    Screening for cardiovascular condition  For upcoming physical  - Lipid Profile (Chol, Trig, HDL, LDL calc); Future             Patient Instructions   Dear Ruma,    It was great to see you today.    The clinic will call you to make a lab only appt.    We will check the labs for your blood pressure medications.      Return for Appt already scheduled.    Liliana Deng MD  [facility] [place] Hendrix is a 41 year old, presenting for the following health issues:  No chief complaint on file.      History of Present Illness       Hypertension: She presents for follow up of hypertension.  She does not check blood pressure  regularly outside of the clinic. Outpatient blood pressures have not been over 140/90. She does not follow a low salt diet.       Last visit: Jose Manuel turned [age] this weekend.  Their family is doing very well.    Has upcoming physical in January.    Eczema - trigger stress and heat, little bubbles, uses about once per month.    Migraines - has maxalt in cabinet - hasn't used in over two years.     So far COVID free - testing a lot when sick.      Review of Systems         Objective           Vitals:  No vitals were obtained today due to virtual visit.    Physical  Exam   GENERAL: Healthy, alert and no distress  EYES: Eyes grossly normal to inspection.  No discharge or erythema, or obvious scleral/conjunctival abnormalities.  RESP: No audible wheeze, cough, or visible cyanosis.  No visible retractions or increased work of breathing.    SKIN: Visible skin clear. No significant rash, abnormal pigmentation or lesions.  NEURO: Cranial nerves grossly intact.  Mentation and speech appropriate for age.  PSYCH: Mentation appears normal, affect normal/bright, judgement and insight intact, normal speech and appearance well-groomed.                Video-Visit Details    Video Start Time: 9:25am    Type of service:  Video Visit    Video End Time:9:41 AM    Originating Location (pt. Location): Home    Distant Location (provider location):  [facility] [place]     Platform used for Video Visit: [employer].

## 2022-10-03 ENCOUNTER — VIRTUAL VISIT (OUTPATIENT)
Dept: FAMILY MEDICINE | Facility: CLINIC | Age: 42
End: 2022-10-03
Payer: COMMERCIAL

## 2022-10-03 DIAGNOSIS — H10.31 ACUTE CONJUNCTIVITIS OF RIGHT EYE, UNSPECIFIED ACUTE CONJUNCTIVITIS TYPE: ICD-10-CM

## 2022-10-03 DIAGNOSIS — J01.01 ACUTE RECURRENT MAXILLARY SINUSITIS: Primary | ICD-10-CM

## 2022-10-03 PROCEDURE — 99214 OFFICE O/P EST MOD 30 MIN: CPT | Mod: GT | Performed by: INTERNAL MEDICINE

## 2022-10-03 RX ORDER — CIPROFLOXACIN HYDROCHLORIDE 3.5 MG/ML
1-2 SOLUTION/ DROPS TOPICAL EVERY 4 HOURS
Qty: 5 ML | Refills: 0 | Status: SHIPPED | OUTPATIENT
Start: 2022-10-03 | End: 2023-01-06

## 2022-10-03 RX ORDER — DOXYCYCLINE HYCLATE 100 MG
100 TABLET ORAL 2 TIMES DAILY
Qty: 20 TABLET | Refills: 0 | Status: SHIPPED | OUTPATIENT
Start: 2022-10-03 | End: 2023-01-06

## 2022-10-03 NOTE — PROGRESS NOTES
Ruma is a 42 year old who is being evaluated via a billable video visit.      How would you like to obtain your AVS? MyChart  If the video visit is dropped, the invitation should be resent by: Text to cell phone: [phone]  Will anyone else be joining your video visit? No          Assessment & Plan     Acute recurrent maxillary sinusitis  She has a history of having sinus issues in the past  Since her symptoms have been ongoing for almost a week and she is having pain in the maxillary sinus areas and also some drainage it is reasonable to start some antibiotics  She has multiple antibiotic allergies  She did receive doxycycline in the past and as per my chart review there were no issues with that  She tends to  developed some hives with antibiotics at times  If she develops any rash or itching then she can take some Benadryl and stop the doxycycline but it is unlikely that is going to happen since she tolerated this antibiotic very well in the past  Also discussed about steam inhalatio  - doxycycline hyclate (VIBRA-TABS) 100 MG tablet; Take 1 tablet (100 mg) by mouth 2 times daily    Acute conjunctivitis of right eye, unspecified acute conjunctivitis type  She does have some redness in the right eye  No problem with the vision  Slight discharge  It could be that she has something like a blocked lacrimal duct  However if the redness is getting worse and if the discharge is increasingly hard to treat this like an enteritis  She does wear contact lenses which are disposable every day  For this reason I selected this ciprofloxacin eyedrops  - ciprofloxacin (CILOXAN) 0.3 % ophthalmic solution; Place 1-2 drops into the right eye every 4 hours      30 minutes spent on the date of the encounter doing chart review, history and exam, documentation and further activities per the note           Return in about 4 weeks (around 10/31/2022), or if symptoms worsen or fail to improve.    Micha Shanks MD  [facility]  [facility]    Татьяна Hendrix is a 42 year old, presenting for the following health issues:  No chief complaint on file.      History of Present Illness       Reason for visit:  Sinus infection and eye infection  Symptoms include:  Green nasal congestion, yellow eye discharge  Symptom intensity:  Moderate  Symptom progression:  Worsening  Had these symptoms before:  Yes  Has tried/received treatment for these symptoms:  No  What makes it worse:  Laying down  What makes it better:  Saline sinus rinse    She eats 2-3 servings of fruits and vegetables daily.She consumes 0 sweetened beverage(s) daily.She exercises with enough effort to increase her heart rate 30 to 60 minutes per day.  She exercises with enough effort to increase her heart rate 6 days per week.   She is taking medications regularly.             Review of Systems   Constitutional, HEENT, cardiovascular, pulmonary, gi and gu systems are negative, except as otherwise noted.      Objective           Vitals:  No vitals were obtained today due to virtual visit.    Physical Exam   GENERAL: Healthy, alert and no distress  EYES: Eyes grossly normal to inspection.  No discharge or erythema, or obvious scleral/conjunctival abnormalities.  RESP: No audible wheeze, cough, or visible cyanosis.  No visible retractions or increased work of breathing.    SKIN: Visible skin clear. No significant rash, abnormal pigmentation or lesions.  NEURO: Cranial nerves grossly intact.  Mentation and speech appropriate for age.  PSYCH: Mentation appears normal, affect normal/bright, judgement and insight intact, normal speech and appearance well-groomed.                Video-Visit Details    Video Start Time: 230 PM    Type of service:  Video Visit    Video End Time:250 PM    Originating Location (pt. Location): Home    Distant Location (provider location):  [facility]     Platform used for Video Visit: [employer]

## 2022-10-17 ENCOUNTER — LAB (OUTPATIENT)
Dept: LAB | Facility: CLINIC | Age: 42
End: 2022-10-17
Payer: COMMERCIAL

## 2022-10-17 DIAGNOSIS — Z13.6 SCREENING FOR CARDIOVASCULAR CONDITION: ICD-10-CM

## 2022-10-17 DIAGNOSIS — I10 BENIGN ESSENTIAL HYPERTENSION: ICD-10-CM

## 2022-10-17 LAB
ANION GAP SERPL CALCULATED.3IONS-SCNC: 7 MMOL/L (ref 3–14)
BUN SERPL-MCNC: 11 MG/DL (ref 7–30)
CALCIUM SERPL-MCNC: 9 MG/DL (ref 8.5–10.1)
CHLORIDE BLD-SCNC: 106 MMOL/L (ref 94–109)
CHOLEST SERPL-MCNC: 189 MG/DL
CO2 SERPL-SCNC: 25 MMOL/L (ref 20–32)
CREAT SERPL-MCNC: 0.93 MG/DL (ref 0.52–1.04)
FASTING STATUS PATIENT QL REPORTED: YES
GFR SERPL CREATININE-BSD FRML MDRD: 78 ML/MIN/1.73M2
GLUCOSE BLD-MCNC: 97 MG/DL (ref 70–99)
HDLC SERPL-MCNC: 42 MG/DL
LDLC SERPL CALC-MCNC: 119 MG/DL
NONHDLC SERPL-MCNC: 147 MG/DL
POTASSIUM BLD-SCNC: 3.7 MMOL/L (ref 3.4–5.3)
SODIUM SERPL-SCNC: 138 MMOL/L (ref 133–144)
TRIGL SERPL-MCNC: 140 MG/DL

## 2022-10-17 PROCEDURE — 80048 BASIC METABOLIC PNL TOTAL CA: CPT

## 2022-10-17 PROCEDURE — 80061 LIPID PANEL: CPT

## 2022-10-17 PROCEDURE — 36415 COLL VENOUS BLD VENIPUNCTURE: CPT

## 2022-10-22 ENCOUNTER — HEALTH MAINTENANCE LETTER (OUTPATIENT)
Age: 42
End: 2022-10-22

## 2022-11-18 ENCOUNTER — E-VISIT (OUTPATIENT)
Dept: PEDIATRICS | Facility: CLINIC | Age: 42
End: 2022-11-18
Payer: COMMERCIAL

## 2022-11-18 ENCOUNTER — ALLIED HEALTH/NURSE VISIT (OUTPATIENT)
Dept: PEDIATRICS | Facility: CLINIC | Age: 42
End: 2022-11-18
Payer: COMMERCIAL

## 2022-11-18 DIAGNOSIS — R07.0 THROAT PAIN: ICD-10-CM

## 2022-11-18 DIAGNOSIS — R07.0 THROAT PAIN: Primary | ICD-10-CM

## 2022-11-18 LAB — DEPRECATED S PYO AG THROAT QL EIA: POSITIVE

## 2022-11-18 PROCEDURE — 99207 PR NO CHARGE NURSE ONLY: CPT

## 2022-11-18 PROCEDURE — 99421 OL DIG E/M SVC 5-10 MIN: CPT | Performed by: PEDIATRICS

## 2022-11-18 PROCEDURE — 87880 STREP A ASSAY W/OPTIC: CPT

## 2022-11-18 NOTE — PATIENT INSTRUCTIONS
Thank you for choosing us for your care. Given your symptoms, I would like you to do a lab-only visit to determine if you truly have strep.  It is not recommended to treat without a positive test result.     I have placed the orders.  Please schedule an appointment with the lab right here in [facility], or call [phone].  I will let you know when the results are back and next steps to take.

## 2022-11-19 ENCOUNTER — MYC MEDICAL ADVICE (OUTPATIENT)
Dept: PEDIATRICS | Facility: CLINIC | Age: 42
End: 2022-11-19

## 2022-11-19 DIAGNOSIS — J02.0 STREPTOCOCCAL PHARYNGITIS: ICD-10-CM

## 2022-11-19 DIAGNOSIS — J02.0 STREPTOCOCCAL PHARYNGITIS: Primary | ICD-10-CM

## 2022-11-19 RX ORDER — CLINDAMYCIN HCL 300 MG
300 CAPSULE ORAL 3 TIMES DAILY
Qty: 30 CAPSULE | Refills: 0 | Status: SHIPPED | OUTPATIENT
Start: 2022-11-19 | End: 2023-01-06

## 2022-11-19 RX ORDER — CLINDAMYCIN HCL 300 MG
300 CAPSULE ORAL 3 TIMES DAILY
Qty: 30 CAPSULE | Refills: 0 | Status: SHIPPED | OUTPATIENT
Start: 2022-11-19 | End: 2022-11-19

## 2022-11-19 NOTE — TELEPHONE ENCOUNTER
Nurse Triage SBAR    Is this a 2nd Level Triage? NO    Situation:     Patient calling reporting she received My Chart Results on positive strep lab.   Patient is requesting antibiotic treatment as soon as possible.    Background:     Known exposure to strep in home/son    Assessment:     Sore throat x 3 days.    Protocol Recommended Disposition:   No disposition on file.    Recommendation:     Please advise on antibiotics     Paged to provider     937 a.m. paged on call provider Dr Calvin through [employer] to call Agnes at .    1000 a.m. placed call to [employer] 2nd page to call Agnes at  [phone].    1015 a.m. patient updated we are waiting on MD response.   Does the patient meet one of the following criteria for ADS visit consideration? 16+ years old, with an [facility] PCP    Dr Calvin reached on cell advised agrees to send prescription for Clindamycin 10 day course to pharmacy.    Advised side effects may include GI upset, diarrhea.  Patient is to call PCP with any watery stools.    Advised to have allergy list updated with more detail or follow up with allergist.      Provider Recommendation Follow Up:   Reached patient/caregiver. Informed of provider's recommendations. Patient verbalized understanding and agrees with the plan.       TIP  Providers, please consider if this condition is appropriate for management at one of our Acute and Diagnostic Services sites.     If patient is a good candidate, please use dotphrase <dot>triageresponse and select Refer to ADS to document.

## 2022-11-19 NOTE — TELEPHONE ENCOUNTER
Patient requesting pharmacy change only on Clindamycin.    Reordered to [facility] per patient request.      Agnes Morrow RN  [facility]

## 2023-01-05 SDOH — HEALTH STABILITY: PHYSICAL HEALTH: ON AVERAGE, HOW MANY MINUTES DO YOU ENGAGE IN EXERCISE AT THIS LEVEL?: 30 MIN

## 2023-01-05 SDOH — HEALTH STABILITY: PHYSICAL HEALTH: ON AVERAGE, HOW MANY DAYS PER WEEK DO YOU ENGAGE IN MODERATE TO STRENUOUS EXERCISE (LIKE A BRISK WALK)?: 3 DAYS

## 2023-01-05 SDOH — ECONOMIC STABILITY: INCOME INSECURITY: HOW HARD IS IT FOR YOU TO PAY FOR THE VERY BASICS LIKE FOOD, HOUSING, MEDICAL CARE, AND HEATING?: NOT HARD AT ALL

## 2023-01-05 SDOH — ECONOMIC STABILITY: FOOD INSECURITY: WITHIN THE PAST 12 MONTHS, THE FOOD YOU BOUGHT JUST DIDN'T LAST AND YOU DIDN'T HAVE MONEY TO GET MORE.: NEVER TRUE

## 2023-01-05 SDOH — ECONOMIC STABILITY: INCOME INSECURITY: IN THE LAST 12 MONTHS, WAS THERE A TIME WHEN YOU WERE NOT ABLE TO PAY THE MORTGAGE OR RENT ON TIME?: NO

## 2023-01-05 SDOH — ECONOMIC STABILITY: TRANSPORTATION INSECURITY
IN THE PAST 12 MONTHS, HAS LACK OF TRANSPORTATION KEPT YOU FROM MEETINGS, WORK, OR FROM GETTING THINGS NEEDED FOR DAILY LIVING?: NO

## 2023-01-05 SDOH — ECONOMIC STABILITY: FOOD INSECURITY: WITHIN THE PAST 12 MONTHS, YOU WORRIED THAT YOUR FOOD WOULD RUN OUT BEFORE YOU GOT MONEY TO BUY MORE.: NEVER TRUE

## 2023-01-05 SDOH — ECONOMIC STABILITY: TRANSPORTATION INSECURITY
IN THE PAST 12 MONTHS, HAS THE LACK OF TRANSPORTATION KEPT YOU FROM MEDICAL APPOINTMENTS OR FROM GETTING MEDICATIONS?: NO

## 2023-01-05 ASSESSMENT — SOCIAL DETERMINANTS OF HEALTH (SDOH)
HOW OFTEN DO YOU GET TOGETHER WITH FRIENDS OR RELATIVES?: MORE THAN THREE TIMES A WEEK
IN A TYPICAL WEEK, HOW MANY TIMES DO YOU TALK ON THE PHONE WITH FAMILY, FRIENDS, OR NEIGHBORS?: MORE THAN THREE TIMES A WEEK
HOW OFTEN DO YOU ATTEND CHURCH OR RELIGIOUS SERVICES?: NEVER
DO YOU BELONG TO ANY CLUBS OR ORGANIZATIONS SUCH AS CHURCH GROUPS UNIONS, FRATERNAL OR ATHLETIC GROUPS, OR SCHOOL GROUPS?: YES

## 2023-01-05 ASSESSMENT — ENCOUNTER SYMPTOMS
HEARTBURN: 0
DYSURIA: 0
MYALGIAS: 0
WEAKNESS: 0
DIARRHEA: 0
HEMATURIA: 0
PALPITATIONS: 0
CONSTIPATION: 0
EYE PAIN: 0
NERVOUS/ANXIOUS: 0
ABDOMINAL PAIN: 0
HEADACHES: 0
SHORTNESS OF BREATH: 0
FREQUENCY: 0
COUGH: 0
BREAST MASS: 0
SORE THROAT: 0
PARESTHESIAS: 0
ARTHRALGIAS: 0
FEVER: 0
JOINT SWELLING: 0
DIZZINESS: 0
CHILLS: 0
NAUSEA: 0
HEMATOCHEZIA: 0

## 2023-01-05 ASSESSMENT — LIFESTYLE VARIABLES
HOW OFTEN DO YOU HAVE A DRINK CONTAINING ALCOHOL: MONTHLY OR LESS
SKIP TO QUESTIONS 9-10: 1
AUDIT-C TOTAL SCORE: 1
HOW MANY STANDARD DRINKS CONTAINING ALCOHOL DO YOU HAVE ON A TYPICAL DAY: 1 OR 2
HOW OFTEN DO YOU HAVE SIX OR MORE DRINKS ON ONE OCCASION: NEVER

## 2023-01-06 ENCOUNTER — OFFICE VISIT (OUTPATIENT)
Dept: PEDIATRICS | Facility: CLINIC | Age: 43
End: 2023-01-06
Payer: COMMERCIAL

## 2023-01-06 VITALS
OXYGEN SATURATION: 98 % | BODY MASS INDEX: 35.52 KG/M2 | TEMPERATURE: 98.5 F | RESPIRATION RATE: 16 BRPM | HEART RATE: 81 BPM | DIASTOLIC BLOOD PRESSURE: 68 MMHG | WEIGHT: 239.8 LBS | HEIGHT: 69 IN | SYSTOLIC BLOOD PRESSURE: 112 MMHG

## 2023-01-06 DIAGNOSIS — I10 BENIGN ESSENTIAL HYPERTENSION: ICD-10-CM

## 2023-01-06 DIAGNOSIS — D68.51 FACTOR V LEIDEN (H): ICD-10-CM

## 2023-01-06 DIAGNOSIS — Z00.00 ROUTINE GENERAL MEDICAL EXAMINATION AT A HEALTH CARE FACILITY: Primary | ICD-10-CM

## 2023-01-06 PROCEDURE — 99396 PREV VISIT EST AGE 40-64: CPT | Performed by: PEDIATRICS

## 2023-01-06 ASSESSMENT — ENCOUNTER SYMPTOMS
COUGH: 0
FEVER: 0
NAUSEA: 0
BREAST MASS: 0
HEMATURIA: 0
SORE THROAT: 0
HEMATOCHEZIA: 0
WEAKNESS: 0
PALPITATIONS: 0
ARTHRALGIAS: 0
MYALGIAS: 0
HEARTBURN: 0
DYSURIA: 0
FREQUENCY: 0
JOINT SWELLING: 0
EYE PAIN: 0
NERVOUS/ANXIOUS: 0
PARESTHESIAS: 0
CONSTIPATION: 0
HEADACHES: 0
ABDOMINAL PAIN: 0
DIARRHEA: 0
CHILLS: 0
SHORTNESS OF BREATH: 0
DIZZINESS: 0

## 2023-01-06 ASSESSMENT — PAIN SCALES - GENERAL: PAINLEVEL: NO PAIN (0)

## 2023-01-06 NOTE — PROGRESS NOTES
SUBJECTIVE:   CC: Ruma is an 42 year old who presents for preventive health visit.   Patient has been advised of split billing requirements and indicates understanding: Yes  Healthy Habits:     Getting at least 3 servings of Calcium per day:  Yes    Bi-annual eye exam:  Yes    Dental care twice a year:  Yes    Sleep apnea or symptoms of sleep apnea:  None    Diet:  Regular (no restrictions)    Frequency of exercise:  2-3 days/week    Duration of exercise:  15-30 minutes    Taking medications regularly:  Yes    PHQ-2 Total Score: 0    Additional concerns today:  No    Breast cancer screening - plan to alternate every 6 months with mammo/MRI. Patient follows at the breast center.     Mirena IUD placed about 4 years ago    Starting to notice some hearing loss.       Today's PHQ-2 Score:   PHQ-2 ( 1999 Pfizer) 1/5/2023   Q1: Little interest or pleasure in doing things 0   Q2: Feeling down, depressed or hopeless 0   PHQ-2 Score 0   PHQ-2 Total Score (12-17 Years)- Positive if 3 or more points; Administer PHQ-A if positive -   Q1: Little interest or pleasure in doing things Not at all   Q2: Feeling down, depressed or hopeless Not at all   PHQ-2 Score 0           Social History     Tobacco Use     Smoking status: Never     Smokeless tobacco: Never   Substance Use Topics     Alcohol use: Not Currently     Alcohol/week: 0.0 - 1.0 standard drinks     Comment: atopped in pregnancy     If you drink alcohol do you typically have >3 drinks per day or >7 drinks per week? No    Alcohol Use 1/5/2023   Prescreen: >3 drinks/day or >7 drinks/week? Not Applicable   No flowsheet data found.    Reviewed orders with patient.  Reviewed health maintenance and updated orders accordingly - Yes      Breast Cancer Screening:    FHS-7: No flowsheet data found.    Alternate screening MRI/mammo based on family history  Pertinent mammograms are reviewed under the imaging tab.    History of abnormal Pap smear: NO - age 30-65 PAP every 5 years  "with negative HPV co-testing recommended  PAP / HPV Latest Ref Rng & Units 1/28/2021 7/21/2015 12/22/2011   PAP (Historical) - NIL NIL NIL   HPV16 NEG:Negative Negative Negative -   HPV18 NEG:Negative Negative Negative -   HRHPV NEG:Negative Negative Negative -     Reviewed and updated as needed this visit by clinical staff   Tobacco   Meds              Reviewed and updated as needed this visit by Provider                     Review of Systems   Constitutional: Negative for chills and fever.   HENT: Negative for congestion, ear pain, hearing loss and sore throat.    Eyes: Negative for pain and visual disturbance.   Respiratory: Negative for cough and shortness of breath.    Cardiovascular: Negative for chest pain, palpitations and peripheral edema.   Gastrointestinal: Negative for abdominal pain, constipation, diarrhea, heartburn, hematochezia and nausea.   Breasts:  Negative for tenderness, breast mass and discharge.   Genitourinary: Negative for dysuria, frequency, genital sores, hematuria, pelvic pain, urgency, vaginal bleeding and vaginal discharge.   Musculoskeletal: Negative for arthralgias, joint swelling and myalgias.   Skin: Negative for rash.   Neurological: Negative for dizziness, weakness, headaches and paresthesias.   Psychiatric/Behavioral: Negative for mood changes. The patient is not nervous/anxious.           OBJECTIVE:   /68   Pulse 81   Temp 98.5  F (36.9  C) (Tympanic)   Resp 16   Ht 1.74 m (5' 8.5\")   Wt 108.8 kg (239 lb 12.8 oz)   LMP 12/17/2022 (Exact Date)   SpO2 98%   BMI 35.93 kg/m    Physical Exam  GENERAL APPEARANCE: healthy, alert and no distress  EYES: Eyes grossly normal to inspection, PERRL and conjunctivae and sclerae normal  HENT: ear canals and TM's normal, nose and mouth without ulcers or lesions, oropharynx clear and oral mucous membranes moist  NECK: no adenopathy, no asymmetry, masses, or scars and thyroid normal to palpation  RESP: lungs clear to auscultation - " "no rales, rhonchi or wheezes  CV: regular rate and rhythm, normal S1 S2, no S3 or S4, no murmur, click or rub, no peripheral edema and peripheral pulses strong  ABDOMEN: soft, nontender, no hepatosplenomegaly, no masses and bowel sounds normal  MS: no musculoskeletal defects are noted and gait is age appropriate without ataxia  SKIN: no suspicious lesions or rashes  NEURO: Normal strength and tone, sensory exam grossly normal, mentation intact and speech normal  PSYCH: mentation appears normal and affect normal/bright    Diagnostic Test Results:  Labs reviewed in Epic    ASSESSMENT/PLAN:   (Z00.00) Routine general medical examination at a health care facility  (primary encounter diagnosis)  Comment: doing well - going to [place] a few times this spring  Plan:     (D68.51) Factor V Leiden (H)  Comment: noted  Plan: no complications    (I10) Benign essential hypertension  Comment: well controlled - addressed at 8/22 visit - no additional E/M today.   Plan: follow up in 1       COUNSELING:  Reviewed preventive health counseling, as reflected in patient instructions      BMI:   Estimated body mass index is 35.93 kg/m  as calculated from the following:    Height as of this encounter: 1.74 m (5' 8.5\").    Weight as of this encounter: 108.8 kg (239 lb 12.8 oz).         She reports that she has never smoked. She has never used smokeless tobacco.      Liliana Deng MD  [facility] [place]  "

## 2023-10-13 ENCOUNTER — TRANSFERRED RECORDS (OUTPATIENT)
Dept: HEALTH INFORMATION MANAGEMENT | Facility: CLINIC | Age: 43
End: 2023-10-13
Payer: COMMERCIAL

## 2023-10-20 ENCOUNTER — ANCILLARY PROCEDURE (OUTPATIENT)
Dept: MAMMOGRAPHY | Facility: CLINIC | Age: 43
End: 2023-10-20
Attending: PEDIATRICS
Payer: COMMERCIAL

## 2023-10-20 DIAGNOSIS — Z12.31 VISIT FOR SCREENING MAMMOGRAM: ICD-10-CM

## 2023-10-20 PROCEDURE — 77063 BREAST TOMOSYNTHESIS BI: CPT

## 2023-10-20 PROCEDURE — 77067 SCR MAMMO BI INCL CAD: CPT

## 2023-11-06 ENCOUNTER — VIRTUAL VISIT (OUTPATIENT)
Dept: PEDIATRICS | Facility: CLINIC | Age: 43
End: 2023-11-06
Payer: COMMERCIAL

## 2023-11-06 DIAGNOSIS — I10 BENIGN ESSENTIAL HYPERTENSION: Primary | ICD-10-CM

## 2023-11-06 DIAGNOSIS — G43.719 INTRACTABLE CHRONIC MIGRAINE WITHOUT AURA AND WITHOUT STATUS MIGRAINOSUS: ICD-10-CM

## 2023-11-06 PROCEDURE — 99214 OFFICE O/P EST MOD 30 MIN: CPT | Mod: VID | Performed by: PEDIATRICS

## 2023-11-06 RX ORDER — LOSARTAN POTASSIUM AND HYDROCHLOROTHIAZIDE 25; 100 MG/1; MG/1
1 TABLET ORAL DAILY
Qty: 90 TABLET | Refills: 3 | Status: SHIPPED | OUTPATIENT
Start: 2023-11-06

## 2023-11-06 NOTE — PROGRESS NOTES
"Ruma is a 43 year old who is being evaluated via a billable video visit.      How would you like to obtain your AVS? MyChart  If the video visit is dropped, the invitation should be resent by:   Will anyone else be joining your video visit? No          Assessment & Plan     (I10) Benign essential hypertension  (primary encounter diagnosis)  Comment: controlled - due for labs  Plan: losartan-hydrochlorothiazide (HYZAAR) 100-25 MG        tablet, Basic metabolic panel  (Ca, Cl, CO2,         Creat, Gluc, K, Na, BUN)            (G43.719) Intractable chronic migraine without aura and without status migrainosus  Comment: two migraines recently after 5 years of no migraines. Likely related to sinus infection. However if she continue to get migraines frequently when healthy may consider imaging.  Most likely releate to hormonal changes with perimenopause  Plan: patient will let me know if she continues to get migraines, then consider MRI.              BMI:   Estimated body mass index is 35.93 kg/m  as calculated from the following:    Height as of 1/6/23: 1.74 m (5' 8.5\").    Weight as of 1/6/23: 108.8 kg (239 lb 12.8 oz).           Liliana Deng MD  [facility]    Татьяна Hendrix is a 43 year old, presenting for the following health issues:  No chief complaint on file.      History of Present Illness       Hypertension: She presents for follow up of hypertension.  She does not check blood pressure  regularly outside of the clinic. Outpatient blood pressures have not been over 140/90. She does not follow a low salt diet.     She eats 2-3 servings of fruits and vegetables daily.She consumes 0 sweetened beverage(s) daily.She exercises with enough effort to increase her heart rate 10 to 19 minutes per day.  She exercises with enough effort to increase her heart rate 3 or less days per week.   She is taking medications regularly.     Patient due for BP labs.    Patient with previous prescription " for migraines - maxalt. Patient has had two migraines in the past two weeks. She thinks triggered by sinus infection. She still has enough maxalt. She had been migraine free x 5 years prior to that. +vomiting with migraines    Previously she thinks the migraines were worst during her fertility treatments. Patient has an IUD in place - no periods. 2/23 - for several months had a light period. She follows with OBGYN at [facility].     9/23 - breast tenderness generalized, pregnancy test negative. Mammo 10/20/23 ok.       Review of Systems         Objective           Vitals:  No vitals were obtained today due to virtual visit.    Physical Exam   GENERAL: Healthy, alert and no distress  EYES: Eyes grossly normal to inspection.  No discharge or erythema, or obvious scleral/conjunctival abnormalities.  RESP: No audible wheeze, cough, or visible cyanosis.  No visible retractions or increased work of breathing.    SKIN: Visible skin clear. No significant rash, abnormal pigmentation or lesions.  NEURO: Cranial nerves grossly intact.  Mentation and speech appropriate for age.  PSYCH: Mentation appears normal, affect normal/bright, judgement and insight intact, normal speech and appearance well-groomed.                Video-Visit Details    Type of service:  Video Visit     Originating Location (pt. Location): Home    Distant Location (provider location):  Off-site  Platform used for Video Visit: [employer]

## 2023-11-09 ENCOUNTER — TRANSFERRED RECORDS (OUTPATIENT)
Dept: HEALTH INFORMATION MANAGEMENT | Facility: CLINIC | Age: 43
End: 2023-11-09
Payer: COMMERCIAL

## 2024-01-22 ENCOUNTER — TRANSFERRED RECORDS (OUTPATIENT)
Dept: HEALTH INFORMATION MANAGEMENT | Facility: CLINIC | Age: 44
End: 2024-01-22
Payer: COMMERCIAL

## 2024-03-24 ENCOUNTER — HEALTH MAINTENANCE LETTER (OUTPATIENT)
Age: 44
End: 2024-03-24

## 2024-10-21 ENCOUNTER — ANCILLARY PROCEDURE (OUTPATIENT)
Dept: MAMMOGRAPHY | Facility: CLINIC | Age: 44
End: 2024-10-21
Attending: PEDIATRICS
Payer: COMMERCIAL

## 2024-10-21 DIAGNOSIS — Z12.31 VISIT FOR SCREENING MAMMOGRAM: ICD-10-CM

## 2024-10-21 PROCEDURE — 77067 SCR MAMMO BI INCL CAD: CPT | Performed by: RADIOLOGY

## 2024-10-21 PROCEDURE — 77063 BREAST TOMOSYNTHESIS BI: CPT | Performed by: RADIOLOGY

## 2024-12-26 DIAGNOSIS — I10 BENIGN ESSENTIAL HYPERTENSION: ICD-10-CM

## 2024-12-26 RX ORDER — LOSARTAN POTASSIUM AND HYDROCHLOROTHIAZIDE 25; 100 MG/1; MG/1
1 TABLET ORAL DAILY
Qty: 30 TABLET | Refills: 0 | Status: SHIPPED | OUTPATIENT
Start: 2024-12-26

## 2025-01-31 PROBLEM — E66.812 CLASS 2 SEVERE OBESITY WITH BODY MASS INDEX (BMI) OF 35 TO 39.9 WITH SERIOUS COMORBIDITY (H): Status: ACTIVE | Noted: 2025-01-31

## 2025-01-31 PROBLEM — E66.01 CLASS 2 SEVERE OBESITY WITH BODY MASS INDEX (BMI) OF 35 TO 39.9 WITH SERIOUS COMORBIDITY (H): Status: ACTIVE | Noted: 2025-01-31

## 2025-02-05 DIAGNOSIS — R79.89 ELEVATED SERUM CREATININE: Primary | ICD-10-CM

## 2025-02-12 ENCOUNTER — TELEPHONE (OUTPATIENT)
Dept: GASTROENTEROLOGY | Facility: CLINIC | Age: 45
End: 2025-02-12
Payer: COMMERCIAL

## 2025-02-12 NOTE — TELEPHONE ENCOUNTER
"Endoscopy Scheduling Screen    Have you had any respiratory illness or flu-like symptoms in the last 10 days?  No      What is your communication preference for Instructions and/or Bowel Prep?   MyChart      What insurance is in the chart?  Other:      Ordering/Referring Provider: DIO RENTERIA   (If ordering provider performs procedure, schedule with ordering provider unless otherwise instructed. )    BMI: Estimated body mass index is 39.4 kg/m  as calculated from the following:    Height as of 1/31/25: 1.727 m (5' 8\").    Weight as of 1/31/25: 117.5 kg (259 lb 1.6 oz).       Sedation Ordered  moderate sedation.   If patient BMI > 50 do not schedule in [facility].    If patient BMI > 45 do not schedule at [facility].    Are you taking methadone or Suboxone?  NO, No RN review required.    Have you been diagnosed and are being treated for severe PTSD or severe anxiety?  NO, No RN review required.    Are you taking any prescription medications for pain 3 or more times per week?   NO, No RN review required.      Do you have a history of malignant hyperthermia?  No      (Females) Are you currently pregnant?   No       Have you been diagnosed or told you have pulmonary hypertension?   No    Do you have an LVAD?  No      Have you been told you have moderate to severe sleep apnea?  No.      Have you been told you have COPD, asthma, or any other lung disease?  No      Do you have any heart conditions?  No       Have you ever had or are you waiting for an organ transplant?  No. Continue scheduling, no site restrictions.      Have you had a stroke or transient ischemic attack (TIA aka \"mini stroke\" in the last 6 months?   No      Have you been diagnosed with or been told you have cirrhosis of the liver?   No.      Are you currently on dialysis?   No      Do you need assistance transferring?   No    BMI: Estimated body mass index is 39.4 kg/m  as calculated from the following:    Height as of 1/31/25: 1.727 m (5' 8\").    Weight as " of 1/31/25: 117.5 kg (259 lb 1.6 oz).     Is patients BMI > 40 and scheduling location [facility]?  No      Do you take an injectable or oral medication for weight loss or diabetes (excluding insulin)?  No      Do you take the medication Naltrexone?  No      Do you take blood thinners?  No       Prep   Are you currently on dialysis or do you have chronic kidney disease?  No      Do you have a diagnosis of diabetes?  No      Do you have a diagnosis of cystic fibrosis (CF)?  No    On a regular basis do you go 3 -5 days between bowel movements?  No      BMI > 40?  No    Preferred Pharmacy:    [facility] [id number] [address] [address]  Phone: [phone] Fax: [phone]      Final Scheduling Details     Procedure scheduled  Colonoscopy

## 2025-03-10 ENCOUNTER — OFFICE VISIT (OUTPATIENT)
Dept: INTERNAL MEDICINE | Facility: CLINIC | Age: 45
End: 2025-03-10
Payer: COMMERCIAL

## 2025-03-10 VITALS
HEART RATE: 102 BPM | WEIGHT: 258.8 LBS | OXYGEN SATURATION: 97 % | SYSTOLIC BLOOD PRESSURE: 139 MMHG | BODY MASS INDEX: 39.22 KG/M2 | DIASTOLIC BLOOD PRESSURE: 87 MMHG | RESPIRATION RATE: 16 BRPM | HEIGHT: 68 IN | TEMPERATURE: 97.9 F

## 2025-03-10 DIAGNOSIS — H65.03 NON-RECURRENT ACUTE SEROUS OTITIS MEDIA OF BOTH EARS: ICD-10-CM

## 2025-03-10 DIAGNOSIS — J34.89 SINUS PRESSURE: Primary | ICD-10-CM

## 2025-03-10 PROCEDURE — 3079F DIAST BP 80-89 MM HG: CPT | Performed by: PHYSICIAN ASSISTANT

## 2025-03-10 PROCEDURE — 99213 OFFICE O/P EST LOW 20 MIN: CPT | Performed by: PHYSICIAN ASSISTANT

## 2025-03-10 PROCEDURE — 3075F SYST BP GE 130 - 139MM HG: CPT | Performed by: PHYSICIAN ASSISTANT

## 2025-03-10 RX ORDER — PREDNISONE 20 MG/1
20 TABLET ORAL DAILY
Qty: 5 TABLET | Refills: 0 | Status: SHIPPED | OUTPATIENT
Start: 2025-03-10

## 2025-03-10 RX ORDER — AZITHROMYCIN 250 MG/1
TABLET, FILM COATED ORAL
Qty: 6 TABLET | Refills: 0 | Status: SHIPPED | OUTPATIENT
Start: 2025-03-10 | End: 2025-03-14

## 2025-03-10 NOTE — PROGRESS NOTES
"  Assessment & Plan     Sinus pressure    - predniSONE (DELTASONE) 20 MG tablet; Take 1 tablet (20 mg) by mouth daily.  - azithromycin (ZITHROMAX) 250 MG tablet; Two tablets first day, then one tablet daily for four days.    Non-recurrent acute serous otitis media of both ears    - predniSONE (DELTASONE) 20 MG tablet; Take 1 tablet (20 mg) by mouth daily.  - azithromycin (ZITHROMAX) 250 MG tablet; Two tablets first day, then one tablet daily for four days.          BMI  Estimated body mass index is 39.35 kg/m  as calculated from the following:    Height as of this encounter: 1.727 m (5' 8\").    Weight as of this encounter: 117.4 kg (258 lb 12.8 oz).             Татьяна Hendrix is a 44 year old, presenting for the following health issues:  Sinus Problem    History of Present Illness       Reason for visit:  Sinus and ear pressure and inflammation  Symptom onset:  1-3 days ago  Symptoms include:  Sinus pressure, ears plugged  Symptom intensity:  Moderate  Symptom progression:  Staying the same  Had these symptoms before:  Yes  Has tried/received treatment for these symptoms:  Yes  Previous treatment was successful:  No  What makes it worse:  Lying down  What makes it better:  Warm liquids, anti-inflammatory   She is taking medications regularly.      Initially ill 2 - 2 1/2 weeks ago with a viral URI    Inflammation with sinus pain and pressure markedly worse over the 2 days.  Did irrigate sinuses  She did take some inflammatory med yesterday.   Pressure and pain noted in both ears now.                     Objective    /87   Pulse 102   Temp 97.9  F (36.6  C) (Temporal)   Resp 16   Ht 1.727 m (5' 8\")   Wt 117.4 kg (258 lb 12.8 oz)   SpO2 97%   BMI 39.35 kg/m    Body mass index is 39.35 kg/m .  Physical Exam   GENERAL: alert and no distress  HENT: normal cephalic/atraumatic, right ear: erythematous and bulging membrane, left ear: erythematous, bulging membrane, and mucopurulent effusion, rhinorrhea " clear, oropharynx clear, and oral mucous membranes moist  RESP: lungs clear to auscultation - no rales, rhonchi or wheezes  CV: regular rates and rhythm and normal S1 S2, no S3 or S4  MS: no gross musculoskeletal defects noted, no edema            Signed Electronically by: Sandrine Green PA-C

## 2025-03-14 ENCOUNTER — MYC MEDICAL ADVICE (OUTPATIENT)
Dept: INTERNAL MEDICINE | Facility: CLINIC | Age: 45
End: 2025-03-14
Payer: COMMERCIAL

## 2025-06-01 ENCOUNTER — ANCILLARY PROCEDURE (OUTPATIENT)
Dept: MRI IMAGING | Facility: CLINIC | Age: 45
End: 2025-06-01
Attending: PEDIATRICS
Payer: COMMERCIAL

## 2025-06-01 DIAGNOSIS — Z80.3 FAMILY HISTORY OF MALIGNANT NEOPLASM OF BREAST: ICD-10-CM

## 2025-06-01 PROCEDURE — A9585 GADOBUTROL INJECTION: HCPCS | Performed by: RADIOLOGY

## 2025-06-01 PROCEDURE — 77049 MRI BREAST C-+ W/CAD BI: CPT | Performed by: RADIOLOGY

## 2025-06-01 RX ORDER — GADOBUTROL 604.72 MG/ML
15 INJECTION INTRAVENOUS ONCE
Status: COMPLETED | OUTPATIENT
Start: 2025-06-01 | End: 2025-06-01

## 2025-06-01 RX ADMIN — GADOBUTROL 12 ML: 604.72 INJECTION INTRAVENOUS at 09:42

## 2025-06-01 NOTE — DISCHARGE INSTRUCTIONS
MRI Contrast Discharge Instructions    The IV contrast you received today will pass out of your body in your  urine. This will happen in the next 24 hours. You will not feel this process.  Your urine will not change color.    Drink at least 4 extra glasses of water or juice today (unless your doctor  has restricted your fluids). This reduces the stress on your kidneys.  You may take your regular medicines.    If you are on dialysis: It is best to have dialysis today.    If you have a reaction: Most reactions happen right away. If you have  any new symptoms after leaving the hospital (such as hives or swelling),  call your hospital at the correct number below. Or call your family doctor.  If you have breathing distress or wheezing, call 911.    Special instructions: ***    I have read and understand the above information.    Signature:______________________________________ Date:___________    Staff:__________________________________________ Date:___________     Time:__________    [facility] Departments:    ___[place]: [phone]  ___[facility]: [phone]  ___[place]: [phone] ___[facility]: [phone]  ___[facility]: [phone]  ___[facility]: [phone]  ___[phone]  ___[facility]: [phone]  ___[phone]

## 2025-06-02 ENCOUNTER — RESULTS FOLLOW-UP (OUTPATIENT)
Dept: PEDIATRICS | Facility: CLINIC | Age: 45
End: 2025-06-02

## 2025-07-26 ENCOUNTER — HEALTH MAINTENANCE LETTER (OUTPATIENT)
Age: 45
End: 2025-07-26

## (undated) DEVICE — PREP CHLORAPREP 26ML TINTED ORANGE  260815

## (undated) DEVICE — SOL WATER IRRIG 1000ML BOTTLE 07139-09

## (undated) DEVICE — SOL NACL 0.9% IRRIG 1000ML BOTTLE 07138-09

## (undated) DEVICE — GLOVE ESTEEM POWDER FREE SMT 6.5  2D72PT65

## (undated) DEVICE — SPONGE LAP 18X18" 1515

## (undated) DEVICE — SU VICRYL 0 CT-1 36" J346H

## (undated) DEVICE — STRAP KNEE/BODY 31143004

## (undated) DEVICE — SU MONOCRYL 4-0 PS-2 18" UND Y496G

## (undated) DEVICE — SU VICRYL 3-0 CTX 36" UND J980H

## (undated) DEVICE — SUCTION CANISTER MEDIVAC LINER 1500ML W/LID 65651-515

## (undated) DEVICE — DRSG STERI STRIP 1/4X3" R1541

## (undated) DEVICE — GLOVE PROTEXIS BLUE W/NEU-THERA 6.5  2D73EB65

## (undated) DEVICE — BASIN SET MAJOR

## (undated) DEVICE — DRAPE SETUP C-SECTION INVISISHIELD 54X90" DYNJE5600

## (undated) DEVICE — PACK C-SECTION LF PL15OTA83B

## (undated) DEVICE — ESU GROUND PAD UNIVERSAL W/O CORD

## (undated) DEVICE — CATH TRAY FOLEY 16FR SILICONE 907416

## (undated) DEVICE — STOCKING SLEEVE COMPRESSION CALF LG

## (undated) RX ORDER — PHENYLEPHRINE HCL IN 0.9% NACL 1 MG/10 ML
SYRINGE (ML) INTRAVENOUS
Status: DISPENSED
Start: 2018-08-21

## (undated) RX ORDER — FENTANYL CITRATE 50 UG/ML
INJECTION, SOLUTION INTRAMUSCULAR; INTRAVENOUS
Status: DISPENSED
Start: 2018-08-21

## (undated) RX ORDER — ONDANSETRON 2 MG/ML
INJECTION INTRAMUSCULAR; INTRAVENOUS
Status: DISPENSED
Start: 2018-08-21